# Patient Record
Sex: MALE | Race: WHITE | NOT HISPANIC OR LATINO | URBAN - METROPOLITAN AREA
[De-identification: names, ages, dates, MRNs, and addresses within clinical notes are randomized per-mention and may not be internally consistent; named-entity substitution may affect disease eponyms.]

---

## 2023-09-14 ENCOUNTER — HOSPITAL ENCOUNTER (OUTPATIENT)
Dept: DATA CONVERSION | Facility: HOSPITAL | Age: 53
End: 2023-09-14

## 2023-09-14 DIAGNOSIS — M54.50 LOW BACK PAIN, UNSPECIFIED: ICD-10-CM

## 2023-09-16 VITALS
SYSTOLIC BLOOD PRESSURE: 130 MMHG | DIASTOLIC BLOOD PRESSURE: 84 MMHG | WEIGHT: 264 LBS | BODY MASS INDEX: 39.1 KG/M2 | HEART RATE: 83 BPM | HEIGHT: 69 IN

## 2023-09-20 ENCOUNTER — HOSPITAL ENCOUNTER (OUTPATIENT)
Dept: DATA CONVERSION | Facility: HOSPITAL | Age: 53
Discharge: HOME | End: 2023-09-20
Payer: COMMERCIAL

## 2023-09-20 DIAGNOSIS — M54.50 LOW BACK PAIN, UNSPECIFIED: ICD-10-CM

## 2023-10-04 ENCOUNTER — APPOINTMENT (OUTPATIENT)
Dept: PHYSICAL THERAPY | Facility: CLINIC | Age: 53
End: 2023-10-04
Payer: COMMERCIAL

## 2023-10-06 PROBLEM — M89.8X5: Status: ACTIVE | Noted: 2023-10-06

## 2023-10-06 PROBLEM — M25.80 CALCIFICATION, JOINT: Status: ACTIVE | Noted: 2023-10-06

## 2023-10-06 PROBLEM — M54.16 LUMBAR RADICULOPATHY: Status: ACTIVE | Noted: 2023-10-06

## 2023-10-06 PROBLEM — R20.2 NUMBNESS AND TINGLING IN RIGHT HAND: Status: ACTIVE | Noted: 2023-10-06

## 2023-10-06 PROBLEM — N52.9 ERECTILE DYSFUNCTION: Status: ACTIVE | Noted: 2023-10-06

## 2023-10-06 PROBLEM — R20.0 NUMBNESS AND TINGLING IN RIGHT HAND: Status: ACTIVE | Noted: 2023-10-06

## 2023-10-06 PROBLEM — L03.019 PARONYCHIA OF FINGER: Status: ACTIVE | Noted: 2023-10-06

## 2023-10-06 PROBLEM — E29.1 TESTICULAR HYPOFUNCTION: Status: ACTIVE | Noted: 2023-10-06

## 2023-10-06 PROBLEM — M51.36 DEGENERATION OF INTERVERTEBRAL DISC OF LUMBAR REGION: Status: ACTIVE | Noted: 2023-10-06

## 2023-10-06 PROBLEM — M10.071 ACUTE IDIOPATHIC GOUT OF RIGHT FOOT: Status: ACTIVE | Noted: 2023-10-06

## 2023-10-06 PROBLEM — K76.89 LIVER CYST: Status: ACTIVE | Noted: 2023-10-06

## 2023-10-06 PROBLEM — M16.11 PRIMARY OSTEOARTHRITIS OF RIGHT HIP: Status: ACTIVE | Noted: 2023-10-06

## 2023-10-06 PROBLEM — R79.89 ABNORMAL LFTS: Status: ACTIVE | Noted: 2023-10-06

## 2023-10-06 PROBLEM — M47.816 LUMBAR SPONDYLOSIS: Status: ACTIVE | Noted: 2023-10-06

## 2023-10-06 PROBLEM — L03.114 CELLULITIS OF HAND, LEFT: Status: ACTIVE | Noted: 2023-10-06

## 2023-10-06 PROBLEM — R16.1 SPLENOMEGALY: Status: ACTIVE | Noted: 2023-10-06

## 2023-10-06 PROBLEM — I10 BENIGN HYPERTENSION: Status: ACTIVE | Noted: 2023-10-06

## 2023-10-06 PROBLEM — S70.00XA CONTUSION OF HIP: Status: ACTIVE | Noted: 2023-10-06

## 2023-10-06 PROBLEM — E66.01 MORBID OBESITY (MULTI): Status: ACTIVE | Noted: 2023-10-06

## 2023-10-06 PROBLEM — K76.0 FATTY LIVER: Status: ACTIVE | Noted: 2023-10-06

## 2023-10-06 PROBLEM — L02.216 ABSCESS OF UMBILICUS: Status: ACTIVE | Noted: 2023-10-06

## 2023-10-06 PROBLEM — E11.9 DIABETES MELLITUS TYPE 2, CONTROLLED, WITHOUT COMPLICATIONS (MULTI): Status: ACTIVE | Noted: 2023-10-06

## 2023-10-06 PROBLEM — M51.369 DEGENERATION OF INTERVERTEBRAL DISC OF LUMBAR REGION: Status: ACTIVE | Noted: 2023-10-06

## 2023-10-06 RX ORDER — CYCLOBENZAPRINE HCL 10 MG
1 TABLET ORAL DAILY
COMMUNITY
End: 2023-11-13 | Stop reason: ALTCHOICE

## 2023-10-06 RX ORDER — PREDNISONE 10 MG/1
TABLET ORAL
COMMUNITY
Start: 2023-05-08 | End: 2023-11-02 | Stop reason: ALTCHOICE

## 2023-10-06 RX ORDER — PHENYLPROPANOLAMINE/CLEMASTINE 75-1.34MG
TABLET, EXTENDED RELEASE ORAL EVERY 8 HOURS
COMMUNITY

## 2023-10-06 RX ORDER — DICLOFENAC SODIUM 50 MG/1
1 TABLET, DELAYED RELEASE ORAL EVERY 8 HOURS
COMMUNITY
End: 2023-12-28 | Stop reason: ALTCHOICE

## 2023-10-06 RX ORDER — SILDENAFIL 100 MG/1
1 TABLET, FILM COATED ORAL
COMMUNITY
Start: 2021-06-01 | End: 2024-02-26 | Stop reason: SDUPTHER

## 2023-10-13 ENCOUNTER — APPOINTMENT (OUTPATIENT)
Dept: PHYSICAL THERAPY | Facility: CLINIC | Age: 53
End: 2023-10-13
Payer: COMMERCIAL

## 2023-10-17 ENCOUNTER — APPOINTMENT (OUTPATIENT)
Dept: NEUROSURGERY | Facility: CLINIC | Age: 53
End: 2023-10-17
Payer: COMMERCIAL

## 2023-10-20 ENCOUNTER — APPOINTMENT (OUTPATIENT)
Dept: PHYSICAL THERAPY | Facility: CLINIC | Age: 53
End: 2023-10-20
Payer: COMMERCIAL

## 2023-10-23 ENCOUNTER — APPOINTMENT (OUTPATIENT)
Dept: PHYSICAL THERAPY | Facility: CLINIC | Age: 53
End: 2023-10-23
Payer: COMMERCIAL

## 2023-10-31 ENCOUNTER — OFFICE VISIT (OUTPATIENT)
Dept: NEUROSURGERY | Facility: CLINIC | Age: 53
End: 2023-10-31
Payer: COMMERCIAL

## 2023-10-31 VITALS — RESPIRATION RATE: 16 BRPM | WEIGHT: 260 LBS | BODY MASS INDEX: 38.68 KG/M2 | HEART RATE: 96 BPM

## 2023-10-31 DIAGNOSIS — M48.061 SPINAL STENOSIS, LUMBAR REGION, WITHOUT NEUROGENIC CLAUDICATION: Primary | ICD-10-CM

## 2023-10-31 PROCEDURE — 3079F DIAST BP 80-89 MM HG: CPT | Performed by: NEUROLOGICAL SURGERY

## 2023-10-31 PROCEDURE — 3044F HG A1C LEVEL LT 7.0%: CPT | Performed by: NEUROLOGICAL SURGERY

## 2023-10-31 PROCEDURE — 99202 OFFICE O/P NEW SF 15 MIN: CPT | Performed by: NEUROLOGICAL SURGERY

## 2023-10-31 PROCEDURE — 3075F SYST BP GE 130 - 139MM HG: CPT | Performed by: NEUROLOGICAL SURGERY

## 2023-10-31 PROCEDURE — 1036F TOBACCO NON-USER: CPT | Performed by: NEUROLOGICAL SURGERY

## 2023-10-31 SDOH — HEALTH STABILITY: PHYSICAL HEALTH: ON AVERAGE, HOW MANY DAYS PER WEEK DO YOU ENGAGE IN MODERATE TO STRENUOUS EXERCISE (LIKE A BRISK WALK)?: 0 DAYS

## 2023-10-31 SDOH — HEALTH STABILITY: PHYSICAL HEALTH: ON AVERAGE, HOW MANY MINUTES DO YOU ENGAGE IN EXERCISE AT THIS LEVEL?: 0 MIN

## 2023-10-31 ASSESSMENT — LIFESTYLE VARIABLES
HOW OFTEN DURING THE LAST YEAR HAVE YOU NEEDED AN ALCOHOLIC DRINK FIRST THING IN THE MORNING TO GET YOURSELF GOING AFTER A NIGHT OF HEAVY DRINKING: NEVER
HAS A RELATIVE, FRIEND, DOCTOR, OR ANOTHER HEALTH PROFESSIONAL EXPRESSED CONCERN ABOUT YOUR DRINKING OR SUGGESTED YOU CUT DOWN: NO
AUDIT-C TOTAL SCORE: 5
HOW OFTEN DO YOU HAVE A DRINK CONTAINING ALCOHOL: 2-3 TIMES A WEEK
HOW OFTEN DURING THE LAST YEAR HAVE YOU BEEN UNABLE TO REMEMBER WHAT HAPPENED THE NIGHT BEFORE BECAUSE YOU HAD BEEN DRINKING: NEVER
HOW OFTEN DURING THE LAST YEAR HAVE YOU FOUND THAT YOU WERE NOT ABLE TO STOP DRINKING ONCE YOU HAD STARTED: NEVER
HOW OFTEN DO YOU HAVE SIX OR MORE DRINKS ON ONE OCCASION: LESS THAN MONTHLY
AUDIT TOTAL SCORE: 5
HOW OFTEN DURING THE LAST YEAR HAVE YOU HAD A FEELING OF GUILT OR REMORSE AFTER DRINKING: NEVER
HOW MANY STANDARD DRINKS CONTAINING ALCOHOL DO YOU HAVE ON A TYPICAL DAY: 3 OR 4
HOW OFTEN DURING THE LAST YEAR HAVE YOU FAILED TO DO WHAT WAS NORMALLY EXPECTED FROM YOU BECAUSE OF DRINKING: NEVER
HAVE YOU OR SOMEONE ELSE BEEN INJURED AS A RESULT OF YOUR DRINKING: NO
SKIP TO QUESTIONS 9-10: 0

## 2023-10-31 ASSESSMENT — ENCOUNTER SYMPTOMS
LOSS OF SENSATION IN FEET: 0
OCCASIONAL FEELINGS OF UNSTEADINESS: 0
DEPRESSION: 0

## 2023-10-31 ASSESSMENT — PAIN SCALES - GENERAL: PAINLEVEL: 0-NO PAIN

## 2023-10-31 NOTE — PROGRESS NOTES
54 yo NPV referral for low back pain    53-year-old man presents for evaluation of lumbar stenosis.  Roughly 6 months ago, the patient experienced new onset of back pain.  Previously, he had developed some pain in his left knee.  The pain did not radiate from his back to his left knee.  He denies having any shooting pains going down his legs.  He denies weakness or numbness in the legs or bowel or bladder changes.    On exam, the patient is alert and interactive.  He has good range of motion at the waist.  Strength is full.    An MRI of the lumbar spine I personally reviewed demonstrates a congenitally narrow canal.  There multilevel degenerative disease.  At L2-3 there is a mild disc bulge that does not result in any significant canal stenosis.  At L3-4 there is a broad-based disc bulge that results in left greater than right-sided canal stenosis.  The canal stenosis is severe at this level.  L4-5 there is a disc bulge that results in moderate canal stenosis and bilateral recess stenosis.  At L5-S1 there is a central disc bulge and some right-sided foraminal stenosis.    While the patient has radiographic stenosis, he does not have any lumbar radiculopathy.  As such, I do not believe he would benefit from any surgical intervention at this time.  I educated them on the symptoms of nerve root compression and asked him to follow-up if any of these develop.

## 2023-11-01 ASSESSMENT — ENCOUNTER SYMPTOMS
CONSTITUTIONAL NEGATIVE: 1
RESPIRATORY NEGATIVE: 1
CARDIOVASCULAR NEGATIVE: 1

## 2023-11-01 NOTE — PATIENT INSTRUCTIONS
Continue to alternate ice and moist heat as needed    Start into Physical Therapy 1-2 times a week for 8-10 weeks with manual therapy as well as dry needling and IASTM    Stressed the importance of wearing shoes with good stability control to help with the biomechanics affecting the knees as well as the lower extremities    Stressed the importance of wearing full foot insoles to help with the biomechanics affecting the knees as well as the lower extremities, Recommendation over-the-counter calcium with vitamin-D 2 -3000+ milligrams a day, as well as OTC symphytum as directed daily to promote bony healing, in addition to a daily multivitamin.    Recommendation over-the-counter Move Free for joint health.    May take the following: Ibuprofen may alternate with OTC Tylenol Extra Strength or OTC Tylenol Arthritis, taking one every 6-8 hours with food as needed.    Patient advised regarding the risks and/or potential adverse reactions and/or side effects of any prescribed medications along with any over-the-counter medications or any supplements used. Patient advised to seek immediate medical care if any adverse reactions occur. The patient and/or patient(s) parent(s) verbalized their understanding,   Discussed in detail with the patient to the level of their understanding the possibility in the future of regenerative injections versus corticosteroid injections versus viscosupplementation injections versus a combination  MRI of the LEFT knees to rule out ligament or tendon injury versus meniscal injury versus stress fracture versus other    Follow up after MRI of LEFT knee with Dr. Heart to discuss injections

## 2023-11-01 NOTE — PROGRESS NOTES
Established patient  History Of Present Illness  Narciso Pardo is a 53 y.o. male presenting with re-injury of his LEFT knee. Unknown trauma or injury. He works at [employer] and is on his feet for twelve hours a day. Pain has worsened over the past two weeks to the point that it is making it difficult to walk. Pain also is disrupting sleep at night. He states that his pain primarily in the medial aspect of his knee. Pain with all range of motion. He does continue to wear the brace he was previously fitted for in this office and notes that while it does support his knee, it does nothing for his pain. Rates current pain as a 8/10 describing it as a constant throbbing pain with sitting and a shocking pain with movement. Pt continues to Move Free and NSAIDs for joint pain over the past month with no improvement in pain. Pt did physical therapy last time he was seen and notes that it did not make any improvements with his pain in his knee which was last done in early September.    Past Medical History  He has a past medical history of Fatty (change of) liver, not elsewhere classified, Other conditions influencing health status, Other obesity (05/11/2017), Other obesity (03/30/2018), Personal history of other diseases of the musculoskeletal system and connective tissue, Personal history of other diseases of the musculoskeletal system and connective tissue, Personal history of other mental and behavioral disorders, and Type 2 diabetes mellitus without complications (CMS/[facility]) (02/03/2017).    Surgical History  He has a past surgical history that includes Shoulder surgery (Left); Wrist surgery (Left); and Carpal tunnel release (Right).     Social History  He reports that he has never smoked. He has never used smokeless tobacco. He reports current alcohol use. He reports that he does not currently use drugs.    Family History  Family History   Problem Relation Name Age of Onset    Diabetes Mother      Diabetes Father  "         Allergies  Patient has no known allergies.    Review of Systems  Review of Systems   Constitutional: Negative.    Respiratory: Negative.     Cardiovascular: Negative.    All other systems reviewed and are negative.       Last Recorded Vitals  /82 (BP Location: Right arm, Patient Position: Sitting, BP Cuff Size: Large adult)   Pulse 88   Ht 1.727 m (5' 8\")   Wt 118 kg (260 lb)   BMI 39.53 kg/m²      Examination:  Left    Edema: Negative  Effusion: Negative.   Percussion Test:  Positive   Tuning Fork Test:  Positive  Ecchymosis/Bruising: Negative.            Palpation:   Positive  Left     Orientation: symmetrical    Range of Motion:    Positive Knee Flexion ( 0-135 degrees) Decreased because of pain  Positive Knee Extension ( 0-15 degrees) Decreased because of pain           Muscle Strength:    Positive +4/+5 Quadricep Extension Causes pain  Positive +4/+5 Hamstring Flexion Causes pain  +5+5 Gastrocnemius  +5+5 Soleus.            Proprioception:   Pain with Squat: Positive    Pain with Sumo Squat:  Positive   Hop Test: Positive    Single leg balance test Positive            Vascular:   +2/+4 Femoral  +2/+4 Dorsalis Pedis  +2/+4 Posterior Tibial  Capillary Refill less than 2 seconds.                Knee - ACL:  Anterior Drawer Test: Positive   Lachman Test: Positive    Lelli Test: Positive                    KNEE - MCL / LCL:  Valgus Stress Test: 90 degrees: Positive  20-30 degrees: Positive    Varus Stress Test:  90 degrees: Negative, 20-30 degrees: Negative.   Apley Distraction Test: Positive    Thessaly Test: Positive             KNEE - MENISCUS:  Apley Compression Test:  Positive        Stienmann Test:  Positive   Terrance Test:  Positive          Bounce Home Test:  Positive   Thessaly Test:  Positive             KNEE - PATELLA:  Apprehension Test:  Positive   Glide Test:  Positive   Grind Test: Positive   Patella Tracking Test: Positive              KNEE - QUADRICEPS:         VMO Test: Positive "   VLO Test:  Negative    Hip/Pelvis - Sacrum:  Leg Length Supine: Negative  Leg Length Supine to Seated (Derbolowsky Sign): Negative  Standing Flexion Test: Negative   Seated Flexion Test: Negative  Spring Test: Negative  Sacral Somatic Dysfunction: Negative  Hip Flexor Tightness: Positive BILATERAL  Hamstring Tightness: Positive LEFT > Right      Imaging and Diagnostics Review:  Radiology      Findings:    Assessment   1. Acute pain of left knee    2. Sprain of medial collateral ligament of left knee, subsequent encounter    3. Left knee injury, subsequent encounter    4. Injury of meniscus of knee, left, subsequent encounter    5. Hamstring strain, left, subsequent encounter    6. Quadriceps strain, left, subsequent encounter        Plan   Treatment or Intervention:  Continue to alternate ice and moist heat as needed    Start into Physical Therapy 1-2 times a week for 8-10 weeks with manual therapy as well as dry needling and IASTM    Stressed the importance of wearing shoes with good stability control to help with the biomechanics affecting the knees as well as the lower extremities    Stressed the importance of wearing full foot insoles to help with the biomechanics affecting the knees as well as the lower extremities, Recommendation over-the-counter calcium with vitamin-D 2 -3000+ milligrams a day, as well as OTC symphytum as directed daily to promote bony healing, in addition to a daily multivitamin.    Recommendation over-the-counter Move Free for joint health.    May take the following: Ibuprofen may alternate with OTC Tylenol Extra Strength or OTC Tylenol Arthritis, taking one every 6-8 hours with food as needed.    Patient advised regarding the risks and/or potential adverse reactions and/or side effects of any prescribed medications along with any over-the-counter medications or any supplements used. Patient advised to seek immediate medical care if any adverse reactions occur. The patient and/or  patient(s) parent(s) verbalized their understanding,   Discussed in detail with the patient to the level of their understanding the possibility in the future of regenerative injections versus corticosteroid injections versus viscosupplementation injections versus a combination  Possibility in future of MRI of the LEFT knees to rule out ligament or tendon injury versus meniscal injury versus stress fracture versus other       Diagnostic studies:      Activity Instructions, Restrictions, and Accommodations:  Pain as guide    Consultations/Referrals:  Physical therapy    Follow-up:  Follow up after MRI of LEFT knee with Dr. Heart to discuss injections    BING LAWRENCE on 11/2/23 at 9:11 AM.     Varghese Sevilla CNP

## 2023-11-02 ENCOUNTER — OFFICE VISIT (OUTPATIENT)
Dept: SPORTS MEDICINE | Facility: CLINIC | Age: 53
End: 2023-11-02
Payer: COMMERCIAL

## 2023-11-02 VITALS
DIASTOLIC BLOOD PRESSURE: 82 MMHG | WEIGHT: 260 LBS | HEIGHT: 68 IN | SYSTOLIC BLOOD PRESSURE: 128 MMHG | BODY MASS INDEX: 39.4 KG/M2 | HEART RATE: 88 BPM

## 2023-11-02 DIAGNOSIS — S76.112D QUADRICEPS STRAIN, LEFT, SUBSEQUENT ENCOUNTER: ICD-10-CM

## 2023-11-02 DIAGNOSIS — S83.8X2D INJURY OF MENISCUS OF KNEE, LEFT, SUBSEQUENT ENCOUNTER: Primary | ICD-10-CM

## 2023-11-02 DIAGNOSIS — M25.562 ACUTE PAIN OF LEFT KNEE: ICD-10-CM

## 2023-11-02 DIAGNOSIS — S89.92XD LEFT KNEE INJURY, SUBSEQUENT ENCOUNTER: ICD-10-CM

## 2023-11-02 DIAGNOSIS — S83.412D SPRAIN OF MEDIAL COLLATERAL LIGAMENT OF LEFT KNEE, SUBSEQUENT ENCOUNTER: ICD-10-CM

## 2023-11-02 DIAGNOSIS — S76.312D HAMSTRING STRAIN, LEFT, SUBSEQUENT ENCOUNTER: ICD-10-CM

## 2023-11-02 PROBLEM — S83.412A SPRAIN OF MEDIAL COLLATERAL LIGAMENT OF LEFT KNEE: Status: ACTIVE | Noted: 2023-11-02

## 2023-11-02 PROCEDURE — 3044F HG A1C LEVEL LT 7.0%: CPT | Performed by: NURSE PRACTITIONER

## 2023-11-02 PROCEDURE — 3079F DIAST BP 80-89 MM HG: CPT | Performed by: NURSE PRACTITIONER

## 2023-11-02 PROCEDURE — 99214 OFFICE O/P EST MOD 30 MIN: CPT | Performed by: NURSE PRACTITIONER

## 2023-11-02 PROCEDURE — 1036F TOBACCO NON-USER: CPT | Performed by: NURSE PRACTITIONER

## 2023-11-02 PROCEDURE — 3074F SYST BP LT 130 MM HG: CPT | Performed by: NURSE PRACTITIONER

## 2023-11-02 PROCEDURE — 2500000004 HC RX 250 GENERAL PHARMACY W/ HCPCS (ALT 636 FOR OP/ED): Performed by: NURSE PRACTITIONER

## 2023-11-02 RX ORDER — KETOROLAC TROMETHAMINE 30 MG/ML
60 INJECTION, SOLUTION INTRAMUSCULAR; INTRAVENOUS ONCE
Status: COMPLETED | OUTPATIENT
Start: 2023-11-02 | End: 2023-11-02

## 2023-11-02 RX ORDER — PREDNISONE 10 MG/1
TABLET ORAL
Qty: 30 TABLET | Refills: 0 | Status: SHIPPED | OUTPATIENT
Start: 2023-11-02 | End: 2023-12-28 | Stop reason: ALTCHOICE

## 2023-11-02 RX ADMIN — KETOROLAC TROMETHAMINE 60 MG: 60 INJECTION, SOLUTION INTRAMUSCULAR at 09:30

## 2023-11-02 RX ADMIN — METHYLPREDNISOLONE SODIUM SUCCINATE 125 MG: 125 INJECTION, POWDER, FOR SOLUTION INTRAMUSCULAR; INTRAVENOUS at 09:31

## 2023-11-02 ASSESSMENT — PAIN SCALES - GENERAL
PAINLEVEL_OUTOF10: 8
PAINLEVEL_OUTOF10: 8

## 2023-11-02 ASSESSMENT — PAIN - FUNCTIONAL ASSESSMENT: PAIN_FUNCTIONAL_ASSESSMENT: 0-10

## 2023-11-02 ASSESSMENT — PAIN DESCRIPTION - DESCRIPTORS: DESCRIPTORS: DISCOMFORT;THROBBING;SHOOTING

## 2023-11-10 ENCOUNTER — HOSPITAL ENCOUNTER (OUTPATIENT)
Dept: RADIOLOGY | Facility: HOSPITAL | Age: 53
Discharge: HOME | End: 2023-11-10
Payer: COMMERCIAL

## 2023-11-10 DIAGNOSIS — S83.412D SPRAIN OF MEDIAL COLLATERAL LIGAMENT OF LEFT KNEE, SUBSEQUENT ENCOUNTER: ICD-10-CM

## 2023-11-10 DIAGNOSIS — S76.312D HAMSTRING STRAIN, LEFT, SUBSEQUENT ENCOUNTER: ICD-10-CM

## 2023-11-10 DIAGNOSIS — M25.562 ACUTE PAIN OF LEFT KNEE: ICD-10-CM

## 2023-11-10 DIAGNOSIS — S83.8X2D INJURY OF MENISCUS OF KNEE, LEFT, SUBSEQUENT ENCOUNTER: ICD-10-CM

## 2023-11-10 DIAGNOSIS — S76.112D QUADRICEPS STRAIN, LEFT, SUBSEQUENT ENCOUNTER: ICD-10-CM

## 2023-11-10 DIAGNOSIS — S89.92XD LEFT KNEE INJURY, SUBSEQUENT ENCOUNTER: ICD-10-CM

## 2023-11-10 PROCEDURE — 73721 MRI JNT OF LWR EXTRE W/O DYE: CPT | Mod: LT

## 2023-11-10 PROCEDURE — 73721 MRI JNT OF LWR EXTRE W/O DYE: CPT | Mod: LEFT SIDE | Performed by: RADIOLOGY

## 2023-11-10 NOTE — PROGRESS NOTES
"Verbal consent of the patient and/or verbal parental consent for patients under the age of 18 have been obtained to conduct a physical examination at this office visit.    Established patient  History Of Present Illness  11/13/23 Narciso Pardo is a 53 y.o. male who presents to review his LEFT knee MRI. He completed Physical Therapy in September 2023. He continues to perform his home exercise program previously provided to him by Physical Therapy. He describes his pain as sharp and burning along the medial aspect of his left knee. He notes pain exacerbates when transitioning from sitting to standing, with ambulation and throughout the evening. He states stretching and stairs in both directions also increase pain. He cites pain disturbs his sleep. He walks with a myopathic gait. Narciso works 12 hour swing shifts at [employer] on a [employer] and states he has been unable to work a full shift since returning to work in September. He tells that he works on concrete floor and consistently goes up and down on ladders. He rates his pain at 7/10 today, but states pain reaches 10/10 daily. He states that his pain is constant. He continues to take previously prescribed Prednisone and takes OTC Ibuprofen, applies OTC Voltaren gel and ice with no relief. He has also worn previously supplied Playmaker knee brace but says his pain increased with use so he stopped wearing it. 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  He would like a cortisone shot if possible.    Last Recorded Vitals  BP (!) 140/92   Pulse 72   Ht 1.727 m (5' 8\")   Wt 118 kg (260 lb)   BMI 39.53 kg/m²      All previous Progress Notes and imaging results related to this patients chief complaint have been reviewed in preparation for this examination.    Past Medical History  He has a past medical history of Fatty (change of) liver, not elsewhere classified, Other " conditions influencing health status, Other obesity (05/11/2017), Other obesity (03/30/2018), Personal history of other diseases of the musculoskeletal system and connective tissue, Personal history of other diseases of the musculoskeletal system and connective tissue, Personal history of other mental and behavioral disorders, and Type 2 diabetes mellitus without complications (CMS/Formerly [facility]) (02/03/2017).    Surgical History  He has a past surgical history that includes Shoulder surgery (Left); Wrist surgery (Left); and Carpal tunnel release (Right).     Social History  He reports that he has never smoked. He has never used smokeless tobacco. He reports current alcohol use. He reports that he does not currently use drugs.    Family History  Family History   Problem Relation Name Age of Onset    Diabetes Mother      Diabetes Father          Allergies  Patient has no known allergies.    Historical Clinical Intake  May 10, 2023--- EAE --- Verbal consent of the patient and/or verbal parental consent for patients under the age of 18 have been obtained to conduct a physical examination at this office visit. Newly established patient. Pt is a 53yr old male, who works as a . He has been graciously referred to this office by Dr. Villalobos for LEFT knee pain that has been bothering him for a month. No known trauma or injury. 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 By Monday, he noticed pain that started in the back of his knee and then moved to the inside. Pain with flexion, extension and squatting. Pt notes that his pain has caused him to start walking with a limp. He purchased a compression knee sleeve as well as alternating between Aleve and Ibuprofen with no relief in pain prior to seeing his primary care provider. Upon suggestion, patient has recently purchased a hinged knee brace and has been taking Prednisone as prescribed by his primary care  provider. He believes that both have currently helped provide some relief in his pain. Rates current pain as a 5/10 describing it as a constant ache, though prior to the new brace and Prednisone, his pain was a 7/10. Denies any numbness or tingling. No previous history of injury.  ---------------------------------------  June 29, 2023 Above note reviewed. Verbal consent of the patient and/or verbal parental consent for patients under the age of 18 have been obtained to conduct a physical examination at this office visit. Narciso returns today for his follow up LEFT knee. Serg presents today with increased pain to LEFT medial knee and additionally LEFT lumbar pain. His gait is ataxic, although he isn't using any assistance devices. He rates his LEFT knee pain 6/10 described as aching and his Left LUMBAR pain as 8/10 described as burning and stabbing. He has not started physical therapy yet, he was out of town and he plans on starting soon. He is wearing a copper knee brace for additional support and he does take the Move free supplement. He adds that he saw Dr Villalobos for his Lumbar pain and he did some xrays and put him on Prednisone, which did not help. Serg denies and tingling or numbness. He takes ibuprofen for pain and he applies ice for some relief. We discussed treatment options. Patient continues to show indications of a knee sprain for which I recommended that he wear a hinged metal brace. Patient was using an over-the-counter hinged middle brace but states that it did not of the any durability and fell apart. As such we will fit patient for playmaker brace for increased ability under ability for his knee and he will start physical therapy as soon as possible. Patient also complaining of low back pain which may be exacerbated by his knee sprain after exam there indications of a low back strain we will amend his physical therapy order to include as lower back and can re-evaluate at his follow-up appointment.  Patient verbalizes understanding and agreement with plan of care.  ---------------------------------------  July 5, 2023. HW  Verbal consent of the patient and/or verbal parental consent for patients under the age of 18 have been obtained to conduct a physical examination at this office visit. This 53 year old male presents with left sided low back pain. He presents today with a limp. He states he was here last Thursday for an appointment for his left knee. He mentioned at the appointment that his low back was bothering him. He states a couple weeks ago he went to [place] to [facility] and when he came home, his low back pain started. He was given a Toradol and Solumedrol injection and he was feeling better Friday morning when he woke up. He states Friday morning he fell down 6 steps on his buttocks, mostly on his left side. He states he also hit his left knee when he fell, but he is no longer having pain in his knee. He states he has pain all the time, with no relief. He has been taking Cyclobenzaprine and Diclofenac with no relief. He hasn't been able to sleep well due to his pain. He states he has more pain with back extension than back flexion. He denies any radiculitis. He was unable to go to work this weekend. 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 He iced, heated, took medications and tried stretching, but he is still in pain. He rates his pain at 8/10 and describes it as sharp. He is supposed to work the next 3 nights, but doesn't think is able to.  ---------------------------------------  August 2, 2023 Above notes reviewed. Verbal consent of the patient and/or verbal parental consent for patients under the age of 18 have been obtained to conduct a physical examination at this office visit. Narciso returns today for his follow up of his LOW BACK. He states that he is feeling slightly improved since his previous visit. Rates current pain as a 3.5/10 describing it as a stiffness. Pt has  "remained out of work since last seen in office. He has completed seven sessions of physical therapy since last visit and feels that it has been helping with his pain. We discussed treatment options. Patient continues to have point tenderness over the lower lumbar spine as well as significant restriction especially with extension lateral flexion and rotation. Patient continues to complain of pain although he states that is improved. Patient has only completed 3 weeks of physical therapy and as such we will continue with current treatment options for another 3-4 weeks and re-evaluate at that time. If patient continues to have pain tenderness and restriction we will consider an MRI at that time. Patient verbalizes understanding and agreement with plan of care.  ---------------------------------------  August 30, 2023 Above notes reviewed. Verbal consent of the patient and/or verbal parental consent for patients under the age of 18 have been obtained to conduct a physical examination at this office visit. Narciso returns today for his follow up of his low back. Pt states that he is feeling better since his previous visit. He feels that his back is back to normal but his knee pain comes and goes. Rates current back pain as a 2/10 describing it as stiff and his knee pain as a 3/10 describing it as a \"nagging\" pain. He continues with physical therapy with about six sessions left, noting that he feels it is helping his back more than his knee. Pt feels that he can return back to work and will bring in paperwork to be completed to document such. We discussed treatment options. Patient has been doing well with his physical therapy and states that he is feeling largely back to normal. Upon exam patient does continue to have point tenderness over the lumbar spine specifically the L3 vertebrae and associated paraspinal muscles. As such we will order an MRI to better evaluate for any underlying pathology. Patient can return to " work without restrictions and we can reevaluate after his MRI or as symptoms change. Patient verbalizes understanding and agreement with plan of care.  ---------------------------------------  September 26, 2023 Above notes reviewed. Verbal consent of the patient and/or verbal parental consent for patients under the age of 18 have been obtained to conduct a physical examination at this office visit. Previously established patient. Narciso returns today for his MRI follow up of his LUMBAR spine. States that his back is feeling somewhat better rating pain as a 2/10 describing it as stiffness. His knee however is worse, rating it as a 6/10. Pt has started and is continuing with physical therapy for his knee. He has been wearing the brace and notes that it is supportive but not making it feel better. We reviewed patient MRI results in detail. Patient verbalizes understanding of results. We discussed treatment options and will refer patient to Dr. Sosa for surgical consult and evaluation as recommended by radiology. Patient can follow-up after surgical consult for reevaluation of the knee and we can discuss treatment options at that time such as physical therapy or more advanced imaging. Patient verbalizes understanding and agreement with plan of care.  ---------------------------------------  11/2/2023---Narciso Pardo is a 53 y.o. male presenting with re-injury of his LEFT knee. Unknown trauma or injury. He works at [employer] and is on his feet for twelve hours a day. Pain has worsened over the past two weeks to the point that it is making it difficult to walk. Pain also is disrupting sleep at night. He states that his pain primarily in the medial aspect of his knee. Pain with all range of motion. He does continue to wear the brace he was previously fitted for in this office and notes that while it does support his knee, it does nothing for his pain. Rates current pain as a 8/10 describing it as a constant  throbbing pain with sitting and a shocking pain with movement. Pt continues to Move Free and NSAIDs for joint pain over the past month with no improvement in pain. Pt did physical therapy last time he was seen and notes that it did not make any improvements with his pain in his knee which was last done in early September.    Review of Systems:  CONSTITUTIONAL:   Negative for weight change, loss of appetite, fatigue, weakness, fever, chills, night sweats, headaches .           HEENT:   Negative for cold, cough, sore throat, sinus pain, swollen lymph nodes.           OPHTHALMOLOGY:   Negative for diminished vision, blurred vision, loss of vision, double vision.           ALLERGY:   Negative for runny nose, scratchy throat, sinus congestion, rash, facial pressure, nasal congestion, post-nasal drip.           CARDIOLOGY:   Negative for chest pain, palpitations, murmurs, irregular heart beat, shortness of breath, leg edema, dyspnea on exertion, fatigue, dizziness.           RESPIRATORY:   Negative for chest pain, shortness of breath, swelling of the legs, asthma/copd, chest congestion, pain with breathing .           GASTROENTEROLOGY:   Negative for nausea, vomitting, heartburn, constipation, diarrhea, blood in stool, change in bowel habits, black stool.           HEMATOLOGY/LYMPH:   Negative for fatigue, loss of appetitie, easy bruising, easy bleeding, anemia, abnormal bleeding, slow healing.           ENDOCRINOLOGY:   Negative for polyuria, polydipsia, polyphagia, fatigue, weight loss, weight gain, cold intolerance, heat intolerance, diabetes.           MUSCULOSKELETAL:   Positive  for LEFT Knee pain        DERMATOLOGY:   Negative for rash, bruising.           NEUROLOGY:   Negative for tingling, numbness, gait abnormality, paresthesias, weakness, sciatica.        General Examination:  GENERAL APPEARANCE: Appears well, pleasant, and cooperative, NAD.   HEENT: Normal, unremarkable.   NECK: Supple.   HEART: RRR, normal  S1S2.   LUNGS: Clear to auscultation bilaterally.   ABDOMEN: Soft, NT/ND, BS present.   EXTREMITIES: No cyanosis, clubbing.   SKIN: No rash or cellulitis.   NEUROLOGIC EXAM: Awake, A&O x 3, CN's II-XII grossly intact.   PSYCH: Good eye contact, appropriate mood and affect     Examination:  Left  Knee  Edema Negative  Effusion: Negative.   Percussion Test:  Positive    Tuning Fork Test:  Positive    Ecchymosis/Bruising: Negative.            Palpation:   Positive Tender to palpation body and posterior joint line medial meniscus LEFT      Orientation:  Asymmetrical: because of the swelling.            Range of Motion:   Positive Knee Flexion ( 0-135 degrees) Decreased because of pain and swelling  Positive Knee Extension ( 0-15 degrees) Decreased because of pain and swelling  about 160 degrees    Muscle Strength:    Positive +4/+5 Quadricep Extension Causes pain  Positive +4/+5  Hamstring Flexion Causes pain          +5+5 Gastrocnemius  +5+5 Soleus.            Proprioception:        Pain with Squat: Positive    Pain with Sumo Squat:  Positive    Hop Test: Positive    Single leg balance test Positive            Vascular:   +2/+4 Femoral  +2/+4 Dorsalis Pedis  +2/+4 Posterior Tibial  Capillary Refill less than 2 seconds.    Diagnostic studies:  MRI of the left knee without contrast      INDICATION:  Signs/Symptoms:LEFT KNEE PAIN      COMPARISON:  None.      ACCESSION NUMBER(S):  [id number]      ORDERING CLINICIAN:  JODIE TORRE      TECHNIQUE:  Multiplanar multisequence MRI of the left knee was performed without  intravenous contrast.      FINDINGS:  Menisci:  Lateral meniscus is intact. Horizontal tear of the body and  posterior horn segments of the medial meniscus with fluid signal  reaching the inferior surface..      Cruciate ligaments: Intact.      Collateral ligaments: Intact.      Tendons:  Intact.      Muscles: Intact.      Bones:  No evidence of acute fracture. Bone marrow signal intensity  is within  normal limits. Sequela of chronic Osgood-Schlatter with  corticated prominence of the tibial tubercle.      Articular cartilage:  Lateral compartment: Intact.  Medial compartment: Mild thinning of the posterior weight-bearing  medial femoral condyle involving a small area of proximally about 14  mm AP. Medial tibial plateau cartilage is intact. Patellofemoral  compartment: Focal near full-thickness chondral loss of superior  aspect of the medial trochlear groove with surrounding areas of  delamination.      Miscellaneous: A small knee joint effusion is noted. A small Baker's  cyst is noted..      IMPRESSION:  1. Horizontal tear of the body and posterior horn segments of the  medial meniscus.  2. Mild medial and patellofemoral compartment degenerative changes  with chondral loss as described above.  3. Small Bakers cyst   4. Small joint effusion  5. Chronic Osgood Schlatter disease      ------------------------------------------------------------------------    PROCEDURE:  KNEE LT MIN 4 VIEW - TXR  [id number]  REASON FOR EXAM: ACUTE PAIN OF LEFT KNEE  RESULT: MRN: [id number]     Patient Name: FARZANA RUSH   STUDY: KNEE LT MIN 4 VIEW 5/10/2023 8:35 am  INDICATION: ACUTE PAIN OF LEFT KNEE 53-year-old man with left knee pain for 1 month, medially. No known injury  COMPARISON: Left tibial radiograph 02/03/2017     ACCESSION NUMBER(S): [id number]   ORDERING CLINICIAN: JODIE TORRE     TECHNIQUE: Four views of the left knee were performed.     FINDINGS:  No cortical irregularity or lucency is identified to suggest acute fracture or dislocation of the left knee. There is mild tricompartmental joint space narrowing with small osteophyte formation. No significant suprapatellar knee joint effusion. Prominent enthesophyte at the tibial tubercle. Small enthesophytes superiorly and inferiorly at the patella.     IMPRESSION:  Degenerative changes of the left knee, as detailed above. No sign of acute fracture or significant  suprapatellar knee joint effusion. If symptoms persist, consider MRI for further evaluation.     Dictation workstation:   [id number]   Original Interpreting Physician:   FRANCESCA HOWELL MD  Original Transcribed by/Date: MMODAL May 10 2023  8:13A      Knee - ACL:  Anterior Drawer Test: Positive    Lachman Test: Positive       Lelli Test:  Positive       KNEE - MCL / LCL:  Valgus Stress Test:  90 degrees: Negative, 20-30 degrees: Negative.   Varus Stress Test:  90 degrees: Positive  20-30 degrees: Positive   Apley Distraction Test: Positive Lateral.   Thessaly Test: Positive Anteior Lateral Meniscus, Posterior Lateral Meniscus, Origins and Insertion LCL, Distal Hamstring.        KNEE - IT BAND:  Cayla Test: Negative.   Noble Test: Negative.         KNEE - MENISCUS:  Apley Compression Test: Positive  Lateral joint line.   Stienmann Test:  Positive    Terrance Test: Positive  Lateral joint line.   Bounce Home Test:  Positive   Thessaly Test:  Positive Anterior Lateral Joint Line Pain and Posterior Lateral Joint Line Pain.            KNEE - PATELLA:  Apprehension Test:  Positive   Glide Test:  Positive   Grind Test: Positive   Patella Tracking Test: Positive               KNEE - QUADRICEPS:    VMO Test: Positive    VLO Test:  Negative.                 Feet/Foot: Positive  BILATERAL  Valgus foot         Assessment      1. Acute pain of left knee  Referral to Physical Therapy    Knee Brace, Hinged      2. Sprain of medial collateral ligament of left knee, subsequent encounter  Referral to Physical Therapy    Knee Brace, Hinged      3. Left knee injury, subsequent encounter  Referral to Physical Therapy    Knee Brace, Hinged      4. Injury of meniscus of knee, left, subsequent encounter  Referral to Physical Therapy    Knee Brace, Hinged    Horizontal tear of the body and posterior horn segments of the  medial meniscus.      5. Instability of left knee joint  Knee Brace, Hinged      6. Hamstring strain, left, subsequent  encounter  Referral to Physical Therapy    Knee Brace, Hinged      7. Quadriceps strain, left, subsequent encounter  Referral to Physical Therapy    Knee Brace, Hinged      8. Patellar maltracking, unspecified laterality  Referral to Physical Therapy    Knee Brace, Hinged      9. Hamstring tightness of both lower extremities  Referral to Physical Therapy    Knee Brace, Hinged      10. Hip flexor tightness, unspecified laterality  Referral to Physical Therapy    Knee Brace, Hinged      11. Leg length discrepancy        12. Valgus deformity of both feet          Procedure   Time Out (5 Minutes): Yes  Patient Name: Narciso Pardo  YOB: 1970  Procedure:  Corticosteroid injection  Needed Supplies Available: Correct Supplies  Laterality: Left knee  Site Verified and Marked: Correct Site(s) Marked  Timeout Performed by Provider: Dr. Douglas Heart D.O.  Staff Initials: ALZ    L Inj/Asp: L knee on 11/13/2023 12:38 PM  Indications: pain and diagnostic evaluation  Details: 22 G needle, ultrasound-guided  Medications: 3 mL lidocaine 20 mg/mL (2 %); 3 mL bupivacaine PF 0.25 % (2.5 mg/mL); 12 mg betamethasone acet,sod phos 6 mg/mL; 5 mg dexAMETHasone 10 mg/mL  Outcome: tolerated well, no immediate complications  Procedure, treatment alternatives, risks and benefits explained, specific risks discussed. Consent was given by the patient. Immediately prior to procedure a time out was called to verify the correct patient, procedure, equipment, support staff and site/side marked as required.           Patient is informed and consent has been signed by the patient if over 18 years old., Patient parent and/or legal guardian is informed and consent has been signed., Patient and/or parent and/or legal guardian accept all risks, benefits, and possible complications associated with procedure(s) and/or manipulation(s) and/or injection(s)., Patient and/or parent and/or legal guardian deny patient allergy or known  complications with any of the medications, instruments, products, and/or techniques used., All questions and concerns were answered and/or addressed with the patient and/or parent and/or legal guardian., The patient and/or parent and/or legal guardian agree to proceed with the procedure(s) and/or manipulation(s) and/or injection(s)., Prep: The area was cleansed with a mixture of equal parts rubbing alcohol 70% and Hibclens., Utilizing clean technique, the injection site(s) were marked with a skin marker., and Injection site(s) were anesthestized with topical analgesic spray prior to injection.    Performed corticosteroid injection into the patients Left knee, under ultrasound.  Corticosteroid mixture of 3cc 2% Lidocaine, 3cc 2% Bupivacaine, 2cc Celestone, 1cc Dexamethasone  (ADD MEDICATIONS TO 'IN OFFICE INJECTION DETAILS' SECTION OF THE ROOMING TAB - DELETE THIS STATEMENT WHEN COMPLETE)    Band-aid and/or compressive bandage was placed over the injection site(s). After the injection(s) performed osteopathic manipulation therapy to the affected area(s) to help increase blood flow to aid with the healing process as well as circulation of the medication. The patient tolerated the procedure well without any complications. The patient was instructed to gently massage the treatment site(s) as well as to apply moist heat to the affected area(s). Additionally, the patient was instructed to contact the office immediately with any complications or concerns.    The Scribe, Aracelis, and Nurse,  Pat, were present in the room during the entire procedure.    The following discharge instructions were reviewed in detail with the patient to the level of their understanding:    PAIN:  A mild to moderate amount of discomfort, tenderness, stiffness, and achiness-caused by the area injected can be expected for a few days after the injection.     SKIN: Due to the numbing spray used, you may develop a red, itchy, scaly rash in the area  that was sprayed. Should your skin become itchy, wash area with mild soap and warm water. If needed, you may apply topical, over-the-counter Hydrocortisone cream to alleviate any itchiness. AVOID scratching due to risk of infection.    BATHING/SWIMMING: You may shower after your procedure with regular soap and water, but no baths, no swimming, and no hot tubs for 3-4 days after the procedure due to risk of infection.    ACTIVITIES:  Immediately following the injection, you may resume daily activities (such as work) and light exercise. We suggest that you refrain from strenuous activity and heavy lifting, particularly the injured body part, for a week post-procedure, but sometimes this can change as well.    MOIST HEAT/ICE:  Moist heat or ice may be used on the injection area.     MEDICATION: You may continue your prescribed medications, over the counter medications or supplements, Aspirin, and/or any anti-inflammatories (Motrin, Advil, Aleve, Ibuprofen, Naproxen etc.). Tylenol Extra Strength, Tylenol Arthritis, or any prescribed narcotics or muscle relaxants are OK to take also.    APPOINTMENTS: You should have a follow-up appointment with Dr. Heart scheduled 1-3 weeks as recommended after your procedure. Please schedule your follow-up appointment on your way out after your procedure, or call the office to schedule these appointments as soon as possible.    PRECAUTIONS: Early, rare post procedure problems that can be concerning are as follows: an increase in pain not relieved by medication (over the counter or prescription), a temperature above 101 degrees, excessive bleeding or progressive, extreme swelling, or numbness.     CALL THE OFFICE OR GO TO THE EMERGENCY ROOM IF ANY OF THESE SYMPTOMS OCCUR.   If you have any questions or problems, please call our office at [phone]        Treatment or Intervention:  Continue to alternate ice and moist heat as needed    Start into Physical Therapy 1-2 times a week for  8-10 weeks with manual therapy as well as dry needling and IASTM especially building up VMO strength   Stressed the importance of wearing shoes with good stability control to help with the biomechanics affecting the knees as well as the lower extremities    Stressed the importance of wearing full foot insoles to help with the biomechanics affecting the knees as well as the lower extremities, Recommendation over-the-counter calcium with vitamin-D 2 -3000+ milligrams a day, as well as OTC symphytum as directed daily to promote bony healing, in addition to a daily multivitamin.    Recommendation over-the-counter Move Free for joint health.    May take the following: Ibuprofen may alternate with OTC Tylenol Extra Strength or OTC Tylenol Arthritis, taking one every 6-8 hours with food as needed.    Patient advised regarding the risks and/or potential adverse reactions and/or side effects of any prescribed medications along with any over-the-counter medications or any supplements used. Patient advised to seek immediate medical care if any adverse reactions occur. The patient and/or patient(s) parent(s) verbalized their understanding,   Discussed in detail with the patient to the level of their understanding the possibility in the future of regenerative injections versus corticosteroid injections versus viscosupplementation injections versus a combination   Reviewed LEFT knee MRI in detail with the patient and/or patients parent/legal guardian to their level of understanding; a copy of these results were provided to the patient and/or patients parent/legal guardian at the time of this office visit. Discussed treatment options with the patient and/or patients parent/legal guardian in detail to the level of their understanding. The patient and/or patients parent/legal guardian verbalized their understanding and agreement to the treatment plan at the time of this office visit.   Discussed with patient to possibility of  surgical intervention in the future to repair meniscal injury.   Performed cortisone injection into LEFT knee under ultrasound  Office to begin initiation for joint lubrication injections for pt LEFT knee.     Diagnostic studies:  No additional imaging or laboratory studies are needed at this time.    Activity Instructions, Restrictions, and Accommodations:  The family has been provided a note (after visit summary) outlining all current activity instructions, restrictions, and accommodations.    Consultations/Referrals:  None at this time.    Follow-up once approval for joint lubrication of the patients Left knee or sooner as needed. Aracelis HAJI, attest this documentation has been prepared under the direction and in the presence of Darline Heart D.O. By signing below, Douglas HAJI D.O, personally performed the services described in this documentation. All medical record entries made by the scribe were at my direction and in my presence. I have reviewed the chart and agree that the record reflects my personal performance and is accurate and complete. Please note that this report has been partially produced using speech recognition software. It may contain errors related to grammar, punctuation or spelling. Electronically signed, but not reviewed. Douglas Heart D.O. Director of Sports Medicine.     ARACELIS LEMA on 11/13/23 at 12:34 PM.     Douglas Heart DO FAOALEX

## 2023-11-13 ENCOUNTER — OFFICE VISIT (OUTPATIENT)
Dept: SPORTS MEDICINE | Facility: CLINIC | Age: 53
End: 2023-11-13
Payer: COMMERCIAL

## 2023-11-13 ENCOUNTER — TELEPHONE (OUTPATIENT)
Dept: SPORTS MEDICINE | Facility: CLINIC | Age: 53
End: 2023-11-13

## 2023-11-13 VITALS
WEIGHT: 260 LBS | HEART RATE: 72 BPM | BODY MASS INDEX: 39.4 KG/M2 | DIASTOLIC BLOOD PRESSURE: 92 MMHG | HEIGHT: 68 IN | SYSTOLIC BLOOD PRESSURE: 140 MMHG

## 2023-11-13 DIAGNOSIS — S83.8X2D INJURY OF MENISCUS OF KNEE, LEFT, SUBSEQUENT ENCOUNTER: ICD-10-CM

## 2023-11-13 DIAGNOSIS — M24.559 HIP FLEXOR TIGHTNESS, UNSPECIFIED LATERALITY: ICD-10-CM

## 2023-11-13 DIAGNOSIS — M25.562 ACUTE PAIN OF LEFT KNEE: ICD-10-CM

## 2023-11-13 DIAGNOSIS — Q66.6 VALGUS DEFORMITY OF BOTH FEET: ICD-10-CM

## 2023-11-13 DIAGNOSIS — M22.8X9 PATELLAR MALTRACKING, UNSPECIFIED LATERALITY: ICD-10-CM

## 2023-11-13 DIAGNOSIS — S76.312D HAMSTRING STRAIN, LEFT, SUBSEQUENT ENCOUNTER: ICD-10-CM

## 2023-11-13 DIAGNOSIS — S83.412D SPRAIN OF MEDIAL COLLATERAL LIGAMENT OF LEFT KNEE, SUBSEQUENT ENCOUNTER: ICD-10-CM

## 2023-11-13 DIAGNOSIS — S76.112D QUADRICEPS STRAIN, LEFT, SUBSEQUENT ENCOUNTER: ICD-10-CM

## 2023-11-13 DIAGNOSIS — M62.9 HAMSTRING TIGHTNESS OF BOTH LOWER EXTREMITIES: ICD-10-CM

## 2023-11-13 DIAGNOSIS — S89.92XD LEFT KNEE INJURY, SUBSEQUENT ENCOUNTER: ICD-10-CM

## 2023-11-13 DIAGNOSIS — M25.362 INSTABILITY OF LEFT KNEE JOINT: ICD-10-CM

## 2023-11-13 DIAGNOSIS — M21.70 LEG LENGTH DISCREPANCY: ICD-10-CM

## 2023-11-13 PROCEDURE — 2500000005 HC RX 250 GENERAL PHARMACY W/O HCPCS: Performed by: FAMILY MEDICINE

## 2023-11-13 PROCEDURE — 1036F TOBACCO NON-USER: CPT | Performed by: FAMILY MEDICINE

## 2023-11-13 PROCEDURE — 99214 OFFICE O/P EST MOD 30 MIN: CPT | Performed by: FAMILY MEDICINE

## 2023-11-13 PROCEDURE — 2500000004 HC RX 250 GENERAL PHARMACY W/ HCPCS (ALT 636 FOR OP/ED): Performed by: FAMILY MEDICINE

## 2023-11-13 PROCEDURE — 3044F HG A1C LEVEL LT 7.0%: CPT | Performed by: FAMILY MEDICINE

## 2023-11-13 PROCEDURE — 20611 DRAIN/INJ JOINT/BURSA W/US: CPT | Performed by: FAMILY MEDICINE

## 2023-11-13 PROCEDURE — 3080F DIAST BP >= 90 MM HG: CPT | Performed by: FAMILY MEDICINE

## 2023-11-13 PROCEDURE — 3077F SYST BP >= 140 MM HG: CPT | Performed by: FAMILY MEDICINE

## 2023-11-13 RX ORDER — BETAMETHASONE SODIUM PHOSPHATE AND BETAMETHASONE ACETATE 3; 3 MG/ML; MG/ML
12 INJECTION, SUSPENSION INTRA-ARTICULAR; INTRALESIONAL; INTRAMUSCULAR; SOFT TISSUE
Status: COMPLETED | OUTPATIENT
Start: 2023-11-13 | End: 2023-11-13

## 2023-11-13 RX ORDER — BUPIVACAINE HYDROCHLORIDE 2.5 MG/ML
3 INJECTION, SOLUTION EPIDURAL; INFILTRATION; INTRACAUDAL
Status: COMPLETED | OUTPATIENT
Start: 2023-11-13 | End: 2023-11-13

## 2023-11-13 RX ORDER — DEXAMETHASONE SODIUM PHOSPHATE 100 MG/10ML
5 INJECTION INTRAMUSCULAR; INTRAVENOUS
Status: COMPLETED | OUTPATIENT
Start: 2023-11-13 | End: 2023-11-13

## 2023-11-13 RX ORDER — LIDOCAINE HYDROCHLORIDE 20 MG/ML
3 INJECTION, SOLUTION INFILTRATION; PERINEURAL
Status: COMPLETED | OUTPATIENT
Start: 2023-11-13 | End: 2023-11-13

## 2023-11-13 RX ADMIN — LIDOCAINE HYDROCHLORIDE 3 ML: 20 INJECTION, SOLUTION INFILTRATION; PERINEURAL at 12:38

## 2023-11-13 RX ADMIN — BETAMETHASONE ACETATE AND BETAMETHASONE SODIUM PHOSPHATE 12 MG: 3; 3 INJECTION, SUSPENSION INTRA-ARTICULAR; INTRALESIONAL; INTRAMUSCULAR; SOFT TISSUE at 12:38

## 2023-11-13 RX ADMIN — BUPIVACAINE HYDROCHLORIDE 3 ML: 2.5 INJECTION, SOLUTION EPIDURAL; INFILTRATION; INTRACAUDAL; PERINEURAL at 12:38

## 2023-11-13 RX ADMIN — DEXAMETHASONE SODIUM PHOSPHATE 5 MG: 10 INJECTION, SOLUTION INTRAMUSCULAR; INTRAVENOUS at 12:38

## 2023-11-13 ASSESSMENT — PAIN - FUNCTIONAL ASSESSMENT: PAIN_FUNCTIONAL_ASSESSMENT: 0-10

## 2023-11-13 ASSESSMENT — PAIN DESCRIPTION - DESCRIPTORS: DESCRIPTORS: BURNING;SHARP

## 2023-11-13 NOTE — TELEPHONE ENCOUNTER
11/13/2023- Initiated benefits investigation via [id number] for viscosupplementation Injections into the patients LEFT KNEE.

## 2023-11-13 NOTE — PATIENT INSTRUCTIONS
Continue to alternate ice and moist heat as needed    Start into Physical Therapy 1-2 times a week for 8-10 weeks with manual therapy as well as dry needling and IASTM especially building up VMO strength   Stressed the importance of wearing shoes with good stability control to help with the biomechanics affecting the knees as well as the lower extremities    Stressed the importance of wearing full foot insoles to help with the biomechanics affecting the knees as well as the lower extremities, Recommendation over-the-counter calcium with vitamin-D 2 -3000+ milligrams a day, as well as OTC symphytum as directed daily to promote bony healing, in addition to a daily multivitamin.  Recommendation over-the-counter Move Free for joint health.    May take the following: Ibuprofen may alternate with OTC Tylenol Extra Strength or OTC Tylenol Arthritis, taking one every 6-8 hours with food as needed.    Patient advised regarding the risks and/or potential adverse reactions and/or side effects of any prescribed medications along with any over-the-counter medications or any supplements used. Patient advised to seek immediate medical care if any adverse reactions occur. The patient and/or patient(s) parent(s) verbalized their understanding,   Discussed in detail with the patient to the level of their understanding the possibility in the future of regenerative injections versus corticosteroid injections versus viscosupplementation injections versus a combination   Reviewed LEFT knee MRI in detail with the patient and/or patients parent/legal guardian to their level of understanding; a copy of these results were provided to the patient and/or patients parent/legal guardian at the time of this office visit. Discussed treatment options with the patient and/or patients parent/legal guardian in detail to the level of their understanding. The patient and/or patients parent/legal guardian verbalized their understanding and agreement to  the treatment plan at the time of this office visit.   Discussed with patient to possibility of surgical intervention in the future to repair meniscal injury.   Office made measurements for patellar reaction unloading knee brace.   Office to begin initiation for joint lubrication injections for pt LEFT knee.   At todays visit, office gave patient a cortisone injection into pts LEFT knee.   Follow up once approval for joint lubrication of patients LEFT knee or sooner as needed.

## 2023-11-14 NOTE — TELEPHONE ENCOUNTER
11/14/2023- Per [employer]-Patient has a Self Funded Commercial PPO Calendar Year with an effective  date of 04/17/2023. Plan follow [place] guidelines.  Effective 12/01/2017, Intra articular Hyaluronan injections of the knee are  considered NOT medically necessary and will no longer be covered with  [facility].  Administration 20610/20611 are covered at 80% of allowable amount.  Deductible has been met. Specialist office visits (if billed) are covered at 100%  of the allowable after a $40.0 copay. If out of pocket is met, coverage goes to  100% and copay will no longer apply. No pre-cert or referrals needed.  Medical notes may be requested at the time of claims processing. Include  medical necessity, diagnosis, and all clinicals.  As per rep, provider would be considered In-Network if contracted with the  Local [facility]. According to [facility] website provider is contracted.  Plan renews on 01/01/2024. Benefits for dates of service after 01/01/2024 may  vary. We recommend that you re-submit a benefit request for dates of service  after 01/01/2024.  Ref # [id number]    CLINICAL DOCUMENTATION UPLOADED-PENDING

## 2023-11-17 ENCOUNTER — APPOINTMENT (OUTPATIENT)
Dept: RADIOLOGY | Facility: CLINIC | Age: 53
End: 2023-11-17
Payer: COMMERCIAL

## 2023-11-22 NOTE — TELEPHONE ENCOUNTER
11/22/2023- As per Beronica HYATT @ [facility], ([phone]), Prescription is still being processed with [facility]. (Rhonda DEL RIO)    PENDING

## 2023-11-28 ENCOUNTER — EVALUATION (OUTPATIENT)
Dept: PHYSICAL THERAPY | Facility: CLINIC | Age: 53
End: 2023-11-28
Payer: COMMERCIAL

## 2023-11-28 ENCOUNTER — APPOINTMENT (OUTPATIENT)
Dept: SPORTS MEDICINE | Facility: CLINIC | Age: 53
End: 2023-11-28
Payer: COMMERCIAL

## 2023-11-28 DIAGNOSIS — M24.559 HIP FLEXOR TIGHTNESS, UNSPECIFIED LATERALITY: ICD-10-CM

## 2023-11-28 DIAGNOSIS — S83.8X2D INJURY OF MENISCUS OF KNEE, LEFT, SUBSEQUENT ENCOUNTER: ICD-10-CM

## 2023-11-28 DIAGNOSIS — S76.112D QUADRICEPS STRAIN, LEFT, SUBSEQUENT ENCOUNTER: ICD-10-CM

## 2023-11-28 DIAGNOSIS — M22.8X9 PATELLAR MALTRACKING, UNSPECIFIED LATERALITY: ICD-10-CM

## 2023-11-28 DIAGNOSIS — S89.92XD LEFT KNEE INJURY, SUBSEQUENT ENCOUNTER: ICD-10-CM

## 2023-11-28 DIAGNOSIS — M25.562 ACUTE PAIN OF LEFT KNEE: ICD-10-CM

## 2023-11-28 DIAGNOSIS — S76.312D HAMSTRING STRAIN, LEFT, SUBSEQUENT ENCOUNTER: ICD-10-CM

## 2023-11-28 DIAGNOSIS — M62.9 HAMSTRING TIGHTNESS OF BOTH LOWER EXTREMITIES: ICD-10-CM

## 2023-11-28 DIAGNOSIS — S83.412D SPRAIN OF MEDIAL COLLATERAL LIGAMENT OF LEFT KNEE, SUBSEQUENT ENCOUNTER: ICD-10-CM

## 2023-11-28 PROCEDURE — 97161 PT EVAL LOW COMPLEX 20 MIN: CPT | Mod: GP

## 2023-11-28 PROCEDURE — 97140 MANUAL THERAPY 1/> REGIONS: CPT | Mod: GP

## 2023-11-28 ASSESSMENT — ENCOUNTER SYMPTOMS
LOSS OF SENSATION IN FEET: 0
DEPRESSION: 0
OCCASIONAL FEELINGS OF UNSTEADINESS: 0

## 2023-11-28 ASSESSMENT — PAIN SCALES - GENERAL: PAINLEVEL_OUTOF10: 2

## 2023-11-28 ASSESSMENT — PAIN DESCRIPTION - DESCRIPTORS: DESCRIPTORS: DULL;ACHING

## 2023-11-28 NOTE — TELEPHONE ENCOUNTER
11/28/2023- Patient called and stated he received a letter in the mail with his APPROVAL for SUPARTZ INJECTIONS. Patient stated his approval date is from 11/20/2023-11/24/2024. Patient wanted to get scheduled for his appointments and stated he would bring in his letter for us to scan it into his chart. Patient is scheduled 1x a week for 5 weeks for SUPARTZ INJECTIONS into his LEFT KNEE.

## 2023-11-28 NOTE — PROGRESS NOTES
Physical Therapy Evaluation and Treatment      Patient Name: Narciso Pardo  MRN: [id number]  Today's Date: 11/28/2023  Time Calculation  Start Time: 1000  Stop Time: 1045  Time Calculation (min): 45 min    PT Low Complexity Eval Time: 30mins    Current Problem:   1. Acute pain of left knee  Referral to Physical Therapy    Follow Up In Physical Therapy      2. Sprain of medial collateral ligament of left knee, subsequent encounter  Referral to Physical Therapy    Follow Up In Physical Therapy      3. Left knee injury, subsequent encounter  Referral to Physical Therapy    Follow Up In Physical Therapy      4. Injury of meniscus of knee, left, subsequent encounter  Referral to Physical Therapy    Follow Up In Physical Therapy    Horizontal tear of the body and posterior horn segments of the  medial meniscus.      5. Hamstring strain, left, subsequent encounter  Referral to Physical Therapy    Follow Up In Physical Therapy      6. Quadriceps strain, left, subsequent encounter  Referral to Physical Therapy    Follow Up In Physical Therapy      7. Patellar maltracking, unspecified laterality  Referral to Physical Therapy    Follow Up In Physical Therapy      8. Hamstring tightness of both lower extremities  Referral to Physical Therapy    Follow Up In Physical Therapy      9. Hip flexor tightness, unspecified laterality  Referral to Physical Therapy    Follow Up In Physical Therapy          Subjective  /General:  General  Reason for Referral: L knee pain  Referred By: Douglas Heart DO  General Comment: Visit #1 (20/yr with 13used previously)  Patient reported hx of current condition: The pt states that in March of 2023, he was bowling 2 days in a row and then the next day, it was bothering him. He notes that the L LE is his pivot/slide leg. He did try to come for PT for the knee, but during that time, his back starting acting up and that was more prominent, so the PT focused on his low back. He reports that his back is  well controlled at this time. His knee has continued to worsen over time. He did have a cortisone injection recently and that helped. He also reports that he received the brace yesterday and notes that it is very bulky, so he hasn't worn it yet.    Pt has continued to do a VMO biased LAQ and SLR at home (from previous PT) also has some low back HEP that he has continued.     Pt reports + hx of clicking, popping, but denies locking and catching    Aggravating factors: side to side movements when standing, prolonged standing/walking >1 hour, walking downhill, descending stairs, quicker movements  Relieving factors: rest, icing      Relevant Imaging:  MR knee left wo IV contrast    Result Date: 11/10/2023  FINDINGS: Menisci:  Lateral meniscus is intact. Horizontal tear of the body and posterior horn segments of the medial meniscus with fluid signal reaching the inferior surface..   Cruciate ligaments: Intact.   Collateral ligaments: Intact.   Tendons:  Intact.   Muscles: Intact.   Bones:  No evidence of acute fracture. Bone marrow signal intensity is within normal limits. Sequela of chronic Osgood-Schlatter with corticated prominence of the tibial tubercle.   Articular cartilage: Lateral compartment: Intact. Medial compartment: Mild thinning of the posterior weight-bearing medial femoral condyle involving a small area of proximally about 14 mm AP. Medial tibial plateau cartilage is intact. Patellofemoral compartment: Focal near full-thickness chondral loss of superior aspect of the medial trochlear groove with surrounding areas of delamination.   Miscellaneous: A small knee joint effusion is noted. A small Baker's cyst is noted..       1. Horizontal tear of the body and posterior horn segments of the medial meniscus. 2. Mild medial and patellofemoral compartment degenerative changes with chondral loss as described above.   MACRO: None.   Signed by: Clary Ortiz 11/10/2023 10:15 AM Dictation workstation:    "[id number]     Precautions:  Precautions  STEADI Fall Risk Score (The score of 4 or more indicates an increased risk of falling): 3  Precautions Comment: none  Pain:  Pain Assessment  Pain Score: 2  Pain Type: Chronic pain  Pain Location: Knee  Pain Orientation: Left, Other (Comment) (medial)  Pain Radiating Towards: none  Pain Descriptors: Dull, Aching (can be sharp with movements)  Home Living:  Home Living Comment: Pt lives in a multistory home with handrails on the stairs   Prior Level of Function:  Prior Function Per Pt/Caregiver Report  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 Mildly antalgic gait pattern on the L LE.     Lumbar AROM Range   Flexion No limitation    Extension 25% limitation \"stiff\"   R sidebend 50% limitation \"stiff\"   L sidebend 50% limitation \"stiff\"      Hip AROM L R   Flexion ~120 deg ~120 deg   External Rotation ~40 deg ~40 deg   Internal Rotation ~10 deg ~10 deg      Knee AROM L R   Flexion 120 deg *at end range 120 deg   Extension 0 deg 0 deg      Hip MMT L R   Hip Flexion 5/5 5/5   Hip Extension 4+/5 4+/5   Hip Abduction 4+/5 4+/5   Hip Adduction 4/5 4/5   External Rotation 5/5 4+/5* knee   Internal Rotation 5/5 4+/5* knee      Knee MMT L R   Knee Flexion 5/5 5/5   Knee Extension 5/5 5/5*      Ankle MMT L R   Dorsiflexion 5/5 5/5   Plantarflexion 5/5 5/5      Special Tests: +/-   Slump -   Scour -   FADIR -   ALEXANDRE -   McMurrarys +on R for pain (worse with tibial ER)   Anterior drawer -   Posterior drawer - for laxity, pt reports some discomfort   Valgus - for laxity, + for pain " "  Varus -      Outcome Measures:  Other Measures  Other Outcome Measures: WOMAC: 41/96     Treatments:  Therapeutic Exercise  Therapeutic Exercise Activity 1: Verbally reviewed and pt provided demo of current HEP - SLR with ER, LAQ with ER, hamstring stretch, and piriformis stretch  Therapeutic Exercise Activity 2: Modification of SLR with ER made: QS and 3-5\" hold times x5reps  Therapeutic Exercise Activity 3: Supine bridge with 3-5\" hold x3    Manual Therapy  Manual Therapy Activity 1: STM/MFR through the pes anserine tendons and along medial tibiofemoral joint line  Manual Therapy Activity 2: Supine patellar mobilizations - Sup/inf and med/lat (grade III-IV)    Manual Time: 10mins  Ther-ex Time: 5mins    OP EDUCATION:  Outpatient Education  Individual(s) Educated: Patient  Education Provided: Anatomy, Home Exercise Program, POC  Risk and Benefits Discussed with Patient/Caregiver/Other: yes  Patient/Caregiver Demonstrated Understanding: yes  Plan of Care Discussed and Agreed Upon: yes  Patient Response to Education: Patient/Caregiver Verbalized Understanding of Information, Patient/Caregiver Performed Return Demonstration of Exercises/Activities, Patient/Caregiver Asked Appropriate Questions  Education Comment: [facility] [employer] Access Code: [email or web address]    HEP to be completed daily; exercises include:  SLR with ER with 3-5\" hold  LAQ with ER with 3-5\" hold  Bridge with 5\" hold  Seated hamstring stretch  Supine modified piriformis stretch    Assessment:  PT Assessment Results: Decreased strength, Decreased range of motion, Pain  Rehab Prognosis: Good    Pt is a 53 y.o. Male who presents with signs and symptoms consistent with degenerative changes to the L knee as well as medial meniscal pathology. The current impairments have led to functional limitations that include: working, prolonged walking/standing, and descending stairs. Pt would benefit from skilled physical therapy intervention to improve impairments and facilitate " "return to prior function.    Plan:  Treatment/Interventions: Aquatic therapy, Cryotherapy, Dry needling, Education/ Instruction, Electrical stimulation, Gait training, Hot pack, Laser, Manual therapy, Neuromuscular re-education, Taping techniques, Therapeutic activities, Therapeutic exercises, Ultrasound  PT Plan: Skilled PT  PT Frequency:  (1-2x/wk)  Duration: 12wks  Onset Date: 11/28/23  Certification Period Start Date: 11/28/23  Certification Period End Date: 02/26/24  Number of Treatments Authorized: 10 then recheck - allowed 20/yr with 13 used on previous POC  Rehab Potential: Good  Plan of Care Agreement: Patient    Goals:  Active       PT Problem       PT STG       Start:  11/28/23    Expected End:  01/12/24       - Pt will complete the HEP with <3 verbal cues for correction  - Pt will demonstrate 2pt improvement on the NPRS, allowing for improved tolerance of functional activities.  - Pt will demonstrate ability to perform 5 step downs from 6\" step with 1 UE support in order to demonstrate improved ability to descend stairs.           PT LTG       Start:  11/28/23    Expected End:  02/26/24       - Pt will be independent in HEP & symptom management  - Pt will demonstrate a 4 pt improvement for knee pain on the NPRS, allowing for improved tolerance to perform daily functional activities.   - Pt will demonstrate at least 120deg knee flexion AROM without onset of pain, allowing for a normal pattern with performance of stairs.  - Pt will demonstrate ability to perform 10 sit to stands without UE use in order to demonstrate improved functional LE strength and improved independence with functional mobility  - Pt will demonstrate ability to perform 10 step downs from 8\" step with 1 UE support and without aggravation of symptoms in order to demonstrate improved ability to descend stairs.  - Pt will demonstrate 5/5 MMT grading throughout hip/knee musculature, allowing for appropriate muscle recruitment during daily " "activities.   - Pt will demonstrate normal mechanics without pain during gait and performance of stairs, allowing for pt to return to navigating the community.  - Pt will perform tandem stance >30\" with EO leading with ea LE in order to demonstrate improved proprioception and stability.  - Pt will demonstrate improved WOMAC score by 11 points (MCID) in order to demonstrate improved functional mobility.            Patient Stated Goal 1       Start:  11/28/23    Expected End:  02/26/24       \"Get rid of pain\"                    "

## 2023-11-28 NOTE — PROGRESS NOTES
Verbal consent of the patient and/or verbal parental consent for patients under the age of 18 have been obtained to conduct a physical examination at this office visit.    Established patient  History Of Present Illness  11/28/23 Narciso Pardo is a 53 y.o. male who presents for Supartz Injection 1/5 injection into the patients LEFT knee. He states that he is feeling better s/p cortisone injection and pain has improved significantly. He continues to c/o dull achy pain along the medial joint line of his LEFT knee. He has started into physical therapy and is performing his home exercise program as previously instructed. He denies any instability, locking, catching, numbness/tingling, or swelling at this time.   He is taking all supplements as previously directed and is wearing shoes with good support as well as OTC insoles as previously recommended.  He rates pain at 2/10 today in his LEFT knee.     Last Recorded Vitals  There were no vitals taken for this visit.     All previous Progress Notes and imaging results related to this patients chief complaint have been reviewed in preparation for this examination.    Past Medical History  He has a past medical history of Fatty (change of) liver, not elsewhere classified, Other conditions influencing health status, Other obesity (05/11/2017), Other obesity (03/30/2018), Personal history of other diseases of the musculoskeletal system and connective tissue, Personal history of other diseases of the musculoskeletal system and connective tissue, Personal history of other mental and behavioral disorders, and Type 2 diabetes mellitus without complications (CMS/[facility]) (02/03/2017).    Surgical History  He has a past surgical history that includes Shoulder surgery (Left); Wrist surgery (Left); and Carpal tunnel release (Right).     Social History  He reports that he has never smoked. He has never used smokeless tobacco. He reports current alcohol use. He reports that he does not  currently use drugs.    Family History  Family History   Problem Relation Name Age of Onset    Diabetes Mother      Diabetes Father          Allergies  Patient has no known allergies.    Historical Clinical Intake  May 10, 2023--- EAE --- Verbal consent of the patient and/or verbal parental consent for patients under the age of 18 have been obtained to conduct a physical examination at this office visit. Newly established patient. Pt is a 53yr old male, who works as a . He has been graciously referred to this office by Dr. Villalobos for LEFT knee pain that has been bothering him for a month. No known trauma or injury. 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 By Monday, he noticed pain that started in the back of his knee and then moved to the inside. Pain with flexion, extension and squatting. Pt notes that his pain has caused him to start walking with a limp. He purchased a compression knee sleeve as well as alternating between Aleve and Ibuprofen with no relief in pain prior to seeing his primary care provider. Upon suggestion, patient has recently purchased a hinged knee brace and has been taking Prednisone as prescribed by his primary care provider. He believes that both have currently helped provide some relief in his pain. Rates current pain as a 5/10 describing it as a constant ache, though prior to the new brace and Prednisone, his pain was a 7/10. Denies any numbness or tingling. No previous history of injury.  ---------------------------------------  June 29, 2023 Above note reviewed. Verbal consent of the patient and/or verbal parental consent for patients under the age of 18 have been obtained to conduct a physical examination at this office visit. Narciso returns today for his follow up LEFT knee. Serg presents today with increased pain to LEFT medial knee and additionally LEFT lumbar pain. His gait is ataxic, although he  isn't using any assistance devices. He rates his LEFT knee pain 6/10 described as aching and his Left LUMBAR pain as 8/10 described as burning and stabbing. He has not started physical therapy yet, he was out of town and he plans on starting soon. He is wearing a copper knee brace for additional support and he does take the Move free supplement. He adds that he saw Dr Villalobos for his Lumbar pain and he did some xrays and put him on Prednisone, which did not help. Serg denies and tingling or numbness. He takes ibuprofen for pain and he applies ice for some relief. We discussed treatment options. Patient continues to show indications of a knee sprain for which I recommended that he wear a hinged metal brace. Patient was using an over-the-counter hinged middle brace but states that it did not of the any durability and fell apart. As such we will fit patient for playmaker brace for increased ability under ability for his knee and he will start physical therapy as soon as possible. Patient also complaining of low back pain which may be exacerbated by his knee sprain after exam there indications of a low back strain we will amend his physical therapy order to include as lower back and can re-evaluate at his follow-up appointment. Patient verbalizes understanding and agreement with plan of care.  ---------------------------------------  July 5, 2023. HW  Verbal consent of the patient and/or verbal parental consent for patients under the age of 18 have been obtained to conduct a physical examination at this office visit. This 53 year old male presents with left sided low back pain. He presents today with a limp. He states he was here last Thursday for an appointment for his left knee. He mentioned at the appointment that his low back was bothering him. He states a couple weeks ago he went to [place] to [facility] and when he came home, his low back pain started. He was given a Toradol and Solumedrol injection and he was feeling  better Friday morning when he woke up. He states Friday morning he fell down 6 steps on his buttocks, mostly on his left side. He states he also hit his left knee when he fell, but he is no longer having pain in his knee. He states he has pain all the time, with no relief. He has been taking Cyclobenzaprine and Diclofenac with no relief. He hasn't been able to sleep well due to his pain. He states he has more pain with back extension than back flexion. He denies any radiculitis. He was unable to go to work this weekend. 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 He iced, heated, took medications and tried stretching, but he is still in pain. He rates his pain at 8/10 and describes it as sharp. He is supposed to work the next 3 nights, but doesn't think is able to.  ---------------------------------------  August 2, 2023 Above notes reviewed. Verbal consent of the patient and/or verbal parental consent for patients under the age of 18 have been obtained to conduct a physical examination at this office visit. Narciso returns today for his follow up of his LOW BACK. He states that he is feeling slightly improved since his previous visit. Rates current pain as a 3.5/10 describing it as a stiffness. Pt has remained out of work since last seen in office. He has completed seven sessions of physical therapy since last visit and feels that it has been helping with his pain. We discussed treatment options. Patient continues to have point tenderness over the lower lumbar spine as well as significant restriction especially with extension lateral flexion and rotation. Patient continues to complain of pain although he states that is improved. Patient has only completed 3 weeks of physical therapy and as such we will continue with current treatment options for another 3-4 weeks and re-evaluate at that time. If patient continues to have pain tenderness and restriction we will consider an MRI at that time.  "Patient verbalizes understanding and agreement with plan of care.  ---------------------------------------  August 30, 2023 Above notes reviewed. Verbal consent of the patient and/or verbal parental consent for patients under the age of 18 have been obtained to conduct a physical examination at this office visit. Narciso returns today for his follow up of his low back. Pt states that he is feeling better since his previous visit. He feels that his back is back to normal but his knee pain comes and goes. Rates current back pain as a 2/10 describing it as stiff and his knee pain as a 3/10 describing it as a \"nagging\" pain. He continues with physical therapy with about six sessions left, noting that he feels it is helping his back more than his knee. Pt feels that he can return back to work and will bring in paperwork to be completed to document such. We discussed treatment options. Patient has been doing well with his physical therapy and states that he is feeling largely back to normal. Upon exam patient does continue to have point tenderness over the lumbar spine specifically the L3 vertebrae and associated paraspinal muscles. As such we will order an MRI to better evaluate for any underlying pathology. Patient can return to work without restrictions and we can reevaluate after his MRI or as symptoms change. Patient verbalizes understanding and agreement with plan of care.  ---------------------------------------  September 26, 2023 Above notes reviewed. Verbal consent of the patient and/or verbal parental consent for patients under the age of 18 have been obtained to conduct a physical examination at this office visit. Previously established patient. Narciso returns today for his MRI follow up of his LUMBAR spine. States that his back is feeling somewhat better rating pain as a 2/10 describing it as stiffness. His knee however is worse, rating it as a 6/10. Pt has started and is continuing with physical therapy for " his knee. He has been wearing the brace and notes that it is supportive but not making it feel better. We reviewed patient MRI results in detail. Patient verbalizes understanding of results. We discussed treatment options and will refer patient to Dr. Sosa for surgical consult and evaluation as recommended by radiology. Patient can follow-up after surgical consult for reevaluation of the knee and we can discuss treatment options at that time such as physical therapy or more advanced imaging. Patient verbalizes understanding and agreement with plan of care.  ---------------------------------------  11/2/2023---Narciso Pardo is a 53 y.o. male presenting with re-injury of his LEFT knee. Unknown trauma or injury. He works at [employer] and is on his feet for twelve hours a day. Pain has worsened over the past two weeks to the point that it is making it difficult to walk. Pain also is disrupting sleep at night. He states that his pain primarily in the medial aspect of his knee. Pain with all range of motion. He does continue to wear the brace he was previously fitted for in this office and notes that while it does support his knee, it does nothing for his pain. Rates current pain as a 8/10 describing it as a constant throbbing pain with sitting and a shocking pain with movement. Pt continues to Move Free and NSAIDs for joint pain over the past month with no improvement in pain. Pt did physical therapy last time he was seen and notes that it did not make any improvements with his pain in his knee which was last done in early September.  ---------------------------------------   11/13/23 Narciso Pardo is a 53 y.o. male who presents to review his LEFT knee MRI. He completed Physical Therapy in September 2023. He continues to perform his home exercise program previously provided to him by Physical Therapy. He describes his pain as sharp and burning along the medial aspect of his left knee. He notes pain  exacerbates when transitioning from sitting to standing, with ambulation and throughout the evening. He states stretching and stairs in both directions also increase pain. He cites pain disturbs his sleep. He walks with a myopathic gait. Narciso works 12 hour swing shifts at [employer] on a [employer] motor and states he has been unable to work a full shift since returning to work in September. He tells that he works on concrete floor and consistently goes up and down on ladders. He rates his pain at 7/10 today, but states pain reaches 10/10 daily. He states that his pain is constant. He continues to take previously prescribed Prednisone and takes OTC Ibuprofen, applies OTC Voltaren gel and ice with no relief. He has also worn previously supplied Playmaker knee brace but says his pain increased with use so he stopped wearing it. 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  He would like a cortisone shot if possible.     Review of Systems:  CONSTITUTIONAL:   Negative for weight change, loss of appetite, fatigue, weakness, fever, chills, night sweats, headaches .           HEENT:   Negative for cold, cough, sore throat, sinus pain, swollen lymph nodes.           OPHTHALMOLOGY:   Negative for diminished vision, blurred vision, loss of vision, double vision.           ALLERGY:   Negative for runny nose, scratchy throat, sinus congestion, rash, facial pressure, nasal congestion, post-nasal drip.           CARDIOLOGY:   Negative for chest pain, palpitations, murmurs, irregular heart beat, shortness of breath, leg edema, dyspnea on exertion, fatigue, dizziness.           RESPIRATORY:   Negative for chest pain, shortness of breath, swelling of the legs, asthma/copd, chest congestion, pain with breathing .           GASTROENTEROLOGY:   Negative for nausea, vomitting, heartburn, constipation, diarrhea, blood in stool, change in bowel habits,  black stool.           HEMATOLOGY/LYMPH:   Negative for fatigue, loss of appetitie, easy bruising, easy bleeding, anemia, abnormal bleeding, slow healing.           ENDOCRINOLOGY:   Negative for polyuria, polydipsia, polyphagia, fatigue, weight loss, weight gain, cold intolerance, heat intolerance, diabetes.           MUSCULOSKELETAL:   Positive  for LEFT Knee pain        DERMATOLOGY:   Negative for rash, bruising.           NEUROLOGY:   Negative for tingling, numbness, gait abnormality, paresthesias, weakness, sciatica.         General Examination:  GENERAL APPEARANCE: Appears well, pleasant, and cooperative, NAD.   HEENT: Normal, unremarkable.   NECK: Supple.   HEART: RRR, normal S1S2.   LUNGS: Clear to auscultation bilaterally.   ABDOMEN: Soft, NT/ND, BS present.   EXTREMITIES: No cyanosis, clubbing.   SKIN: No rash or cellulitis.   NEUROLOGIC EXAM: Awake, A&O x 3, CN's II-XII grossly intact.   PSYCH: Good eye contact, appropriate mood and affect      Examination: All findings improved since corticosteroid injection and last visit  Left  Knee  Edema Negative  Effusion: Negative.   Percussion Test:  Positive    Tuning Fork Test:  Positive    Ecchymosis/Bruising: Negative.            Palpation: All findings improved since corticosteroid injection and last visit  Positive Tender to palpation body and posterior joint line medial meniscus LEFT       Orientation:  Asymmetrical: because of the swelling. All findings improved since corticosteroid injection and last visit           Range of Motion: All findings improved since corticosteroid injection and last visit  Positive Knee Flexion ( 0-135 degrees) still minimally decreased   Positive Knee Extension ( 0-15 degrees) still minimally decreased      Muscle Strength:  All findings improved since corticosteroid injection and last visit  Positive +4-+5/+5 Quadricep Extension Causes pain  Positive +4-+5/+5  Hamstring Flexion Causes pain          +5+5 Gastrocnemius  +5+5  Soleus.            Proprioception:      All findings improved since corticosteroid injection and last visit  Pain with Squat: Positive    Pain with Sumo Squat:  Positive    Hop Test: Positive    Single leg balance test Positive            Vascular:   +2/+4 Femoral  +2/+4 Dorsalis Pedis  +2/+4 Posterior Tibial  Capillary Refill less than 2 seconds.     Diagnostic studies:  MRI of the left knee without contrast      INDICATION:  Signs/Symptoms:LEFT KNEE PAIN      COMPARISON:  None.      ACCESSION NUMBER(S):  [id number]      ORDERING CLINICIAN:  JODIE TORRE      TECHNIQUE:  Multiplanar multisequence MRI of the left knee was performed without  intravenous contrast.      FINDINGS:  Menisci:  Lateral meniscus is intact. Horizontal tear of the body and  posterior horn segments of the medial meniscus with fluid signal  reaching the inferior surface..      Cruciate ligaments: Intact.      Collateral ligaments: Intact.      Tendons:  Intact.      Muscles: Intact.      Bones:  No evidence of acute fracture. Bone marrow signal intensity  is within normal limits. Sequela of chronic Osgood-Schlatter with  corticated prominence of the tibial tubercle.      Articular cartilage:  Lateral compartment: Intact.  Medial compartment: Mild thinning of the posterior weight-bearing  medial femoral condyle involving a small area of proximally about 14  mm AP. Medial tibial plateau cartilage is intact. Patellofemoral  compartment: Focal near full-thickness chondral loss of superior  aspect of the medial trochlear groove with surrounding areas of  delamination.      Miscellaneous: A small knee joint effusion is noted. A small Baker's  cyst is noted..      IMPRESSION:  1. Horizontal tear of the body and posterior horn segments of the  medial meniscus.  2. Mild medial and patellofemoral compartment degenerative changes  with chondral loss as described above.  3. Small Bakers cyst   4. Small joint effusion  5. Chronic Osgood Schlatter  disease        ------------------------------------------------------------------------     PROCEDURE:  KNEE LT MIN 4 VIEW - TXR  [id number]  REASON FOR EXAM: ACUTE PAIN OF LEFT KNEE  RESULT: MRN: [id number]     Patient Name: FARZANA RUSH   STUDY: KNEE LT MIN 4 VIEW 5/10/2023 8:35 am  INDICATION: ACUTE PAIN OF LEFT KNEE 53-year-old man with left knee pain for 1 month, medially. No known injury  COMPARISON: Left tibial radiograph 02/03/2017     ACCESSION NUMBER(S): [id number]   ORDERING CLINICIAN: JODIE TORRE     TECHNIQUE: Four views of the left knee were performed.     FINDINGS:  No cortical irregularity or lucency is identified to suggest acute fracture or dislocation of the left knee. There is mild tricompartmental joint space narrowing with small osteophyte formation. No significant suprapatellar knee joint effusion. Prominent enthesophyte at the tibial tubercle. Small enthesophytes superiorly and inferiorly at the patella.     IMPRESSION:  Degenerative changes of the left knee, as detailed above. No sign of acute fracture or significant suprapatellar knee joint effusion. If symptoms persist, consider MRI for further evaluation.     Dictation workstation:   [id number]   Original Interpreting Physician:   FRANCESCA HOWELL MD  Original Transcribed by/Date: MMODAL May 10 2023  8:13A        Knee - ACL:All findings improved since corticosteroid injection and last visit  Anterior Drawer Test: Positive    Lachman Test: Positive       Lelli Test:  Positive       KNEE - MCL / LCL:All findings improved since corticosteroid injection and last visit  Valgus Stress Test:  90 degrees: Negative, 20-30 degrees: Negative.   Varus Stress Test:  90 degrees: Positive  20-30 degrees: Positive   Apley Distraction Test: Positive Lateral.   Thessaly Test: Positive Anteior Lateral Meniscus, Posterior Lateral Meniscus, Origins and Insertion LCL, Distal Hamstring.         KNEE - IT BAND:All findings improved since corticosteroid  injection and last visit  Cayla Test: Negative.   Noble Test: Negative.          KNEE - MENISCUS:All findings improved since corticosteroid injection and last visit  Apley Compression Test: Positive  Lateral joint line.   Stienmann Test:  Positive    Terrance Test: Positive  Lateral joint line.   Bounce Home Test:  Positive   Thessaly Test:  Positive Anterior Lateral Joint Line Pain and Posterior Lateral Joint Line Pain.            KNEE - PATELLA:All findings improved since corticosteroid injection and last visit  Apprehension Test:  Positive   Glide Test:  Positive   Grind Test: Positive   Patella Tracking Test: Positive               KNEE - QUADRICEPS:    VMO Test: Positive    VLO Test:  Negative.              Feet/Foot: Positive  BILATERAL  Valgus foot     Assessment   1. Acute pain of left knee        2. Sprain of medial collateral ligament of left knee, subsequent encounter        3. Left knee injury, subsequent encounter        4. Injury of meniscus of knee, left, subsequent encounter        5. Instability of left knee joint        6. Hamstring strain, left, subsequent encounter        7. Quadriceps strain, left, subsequent encounter        8. Patellar maltracking, unspecified laterality        9. Hamstring tightness of both lower extremities        10. Hip flexor tightness, unspecified laterality        11. Leg length discrepancy        12. Valgus deformity of both feet            Procedure   Time Out (5 Minutes): Yes  Patient Name: Narciso Pardo  YOB: 1970  Procedure: Supartz Injection 1/5  Needed Supplies Available: Correct Supplies  Laterality: Left Knee  Site Verified and Marked: Correct Site(s) Marked  Timeout Performed by Provider: Dr. Douglas Heart, D.O.  Staff Initials: EH    L Inj/Asp: L knee on 11/30/2023 3:32 PM  Indications: pain  Details: 22 G needle, ultrasound-guided lateral approach  Medications: 20 mg sodium hyaluronate 10 mg/mL  Outcome: tolerated well, no immediate  complications  Procedure, treatment alternatives, risks and benefits explained, specific risks discussed. Consent was given by the patient. Immediately prior to procedure a time out was called to verify the correct patient, procedure, equipment, support staff and site/side marked as required.            Patient is informed and consent has been signed by the patient if over 18 years old., Patient parent and/or legal guardian is informed and consent has been signed., Patient and/or parent and/or legal guardian accept all risks, benefits, and possible complications associated with procedure(s) and/or manipulation(s) and/or injection(s)., Patient and/or parent and/or legal guardian deny patient allergy or known complications with any of the medications, instruments, products, and/or techniques used., All questions and concerns were answered and/or addressed with the patient and/or parent and/or legal guardian., The patient and/or parent and/or legal guardian agree to proceed with the procedure(s) and/or manipulation(s) and/or injection(s)., Prep: The area was cleansed with a mixture of equal parts rubbing alcohol 70% and Hibclens., Utilizing clean technique, the injection site(s) were marked with a skin marker., and Injection site(s) were anesthestized with topical analgesic spray prior to injection.    Performed regenerative Supartz Injection 1/5 into the patients Left Knee, under ultrasound.  Supartz FX (25mg/2.5ml)     Band-aid and/or compressive bandage was placed over the injection site(s). After the injection(s) performed osteopathic manipulation therapy to the affected area(s) to help increase blood flow to aid with the healing process as well as circulation of the medication. The patient tolerated the procedure well without any complications. The patient was instructed to gently massage the treatment site(s) as well as to apply moist heat to the affected area(s). Additionally, the patient was instructed to  contact the office immediately with any complications or concerns.    The Nurse, Char was present in the room during the entire procedure.    The following discharge instructions were reviewed in detail with the patient to the level of their understanding:    PAIN:  A mild to moderate amount of discomfort, tenderness, stiffness, and achiness-caused by the inflammation of the area can be expected for a few days after the injection. This is normal and good as the inflammation is an important part of the healing process.    SKIN: Due to the numbing spray used, you may develop a red, itchy, scaly rash in the area that was sprayed. Should your skin become itchy, wash the area with mild soap and warm water. If needed, you may apply topical over-the-counter Hydrocortisone cream to alleviate any itchiness. AVOID scratching due to risk of infection.,     BATHING/SWIMMING: You may shower after your procedure with regular soap and water, but no baths, no swimming, and no hot tubs for 3-4 days after the procedure.,     ACTIVITIES:  Immediately following the injection, you may resume daily activities (such as work) and light exercise. We suggest that you refrain from strenuous activity and heavy lifting, particularly the injured body part, for a week post-procedure, but sometimes this can change as well.    MOIST HEAT/ICE:  Moist heat may be used on the injection area. NO ICE.  MEDICATION: You may continue your prescribed medications and any over-the-counter supplements; however, it is not recommended to use aspirin or anti-inflammatories (Motrin, Advil, Aleve, Ibuprofen, Naproxen etc.) for up to 2 weeks post procedure. Tylenol Extra Strength, Tylenol Arthritis and/or any prescribed narcotics or muscle relaxants are OK to take.    APPOINTMENTS: You should have a follow-up appointment with Dr. Heart scheduled 1-3 weeks as recommended after your procedure for your next round of injections or to follow-up after you have completed  your injection series. Please schedule your follow-up appointment on your way out after your procedure, or call the office to schedule these appointments as soon as possible.    PRECAUTIONS: Smoking, caffeine, alcohol, sex and anti-inflammatories post-procedure may reduce the effectiveness of Prolotherapy/Neural Prolotherapy/Prolozone injections. Early, rare post procedure problems that can be concerning are as follows: an increase in pain not relieved by medication (over the counter or prescription), a temperature above 101 degrees, bleeding or progressive, extreme swelling, or numbness.     CALL THE OFFICE OR GO TO THE EMERGENCY ROOM IF ANY OF THESE SYMPTOMS OCCUR.   If you have any questions or problems, please call our office at [phone]     Treatment or Intervention:  Continue to alternate ice and moist heat as needed    Continue physical Therapy 1-2 times a week for 8-10 weeks with manual therapy as well as dry needling and IASTM especially building up VMO strength   Once again stressed the importance of wearing shoes with good stability control to help with the biomechanics affecting the knees as well as the lower extremities    Once again stressed the importance of wearing full foot insoles to help with the biomechanics affecting the knees as well as the lower extremities, Recommendation over-the-counter calcium with vitamin-D 2 -3000+ milligrams a day, as well as OTC symphytum as directed daily to promote bony healing, in addition to a daily multivitamin.    Once again recommendation over-the-counter Move Free for joint health.    May continue to take the following: Ibuprofen may alternate with OTC Tylenol Extra Strength or OTC Tylenol Arthritis, taking one every 6-8 hours with food as needed.    Patient advised regarding the risks and/or potential adverse reactions and/or side effects of any prescribed medications along with any over-the-counter medications or any supplements used. Patient advised to  seek immediate medical care if any adverse reactions occur. The patient and/or patient(s) parent(s) verbalized their understanding,   Discussed in detail with the patient to the level of their understanding the possibility in the future of regenerative injections versus corticosteroid injections versus viscosupplementation injections versus a combination  Went over home exercises to be done by the patient regularly  Discussed with patient to possibility of surgical intervention in the future to repair meniscal injury.  However he is against surgical intervention at this time  Performed injection of Supartz #1 into patient's LEFT knee under ultrasound. TREATMENT PLAN: The patient tolerated the procedure well and without any adverse effects. Discharge instructions for regenerative/viscosupplementation/corticosteroid injections were reviewed in detail with the patient to the level of their understanding; a copy of these instructions were provided to the patient at the time of this office visit.   Follow up in 1 week for Supartz #2 in LEFT knee or sooner as needed.     Diagnostic studies:  No additional imaging or laboratory studies are needed at this time.    Activity Instructions, Restrictions, and Accommodations:  No activity limitations or modifications are needed at this time.    Consultations/Referrals:  None at this time.    Follow-up 1 week for Supartz #2 of the patients Left Knee or sooner as needed.     BING MAURO on 11/28/23 at 2:08 PM.     Douglas Heart DO, FAOASM

## 2023-11-28 NOTE — PROGRESS NOTES
"Physical Therapy Evaluation and Treatment      Patient Name: Narciso Pardo  MRN: [id number]  Today's Date: 2023       PT *** Complexity Eval Time: ***    Current Problem:   No diagnosis found.    Subjective  /General:     Patient reported hx of current condition: ***    Relevant Imagin23 L knee MRI  \"IMPRESSION:  1. Horizontal tear of the body and posterior horn segments of the  medial meniscus.  2. Mild medial and patellofemoral compartment degenerative changes  with chondral loss as described above.\"    23 Lumbar MRI  \"IMPRESSION:  Degenerative disc disease and spondylosis most severe at L3-L4 with  mass-effect upon the cauda equina and complete obliteration of the thecal  sac. Clinical correlation is recommended. Orthopedic surgery spine  consultation or neurosurgery consultation may be considered if clinically  Warranted\"    Precautions:     {CNA PT LQ PRECAUTIONS:58030}  Pain:     Home Living:     Prior Level of Function:       Objective   Posture:     Range of Motion:     Strength:     Flexibility:     Palpation:     Special Tests:     Gait:     Balance:     Stairs:     Bed Mobility:     Transfers:     Other:       {Spine,UE,LE,HAND,LYMPHEDEMA:86882}    Lumbar AROM Range   Flexion ***   Extension ***   R sidebend ***   L sidebend ***      Hip AROM L R   Flexion *** deg *** deg   External Rotation *** deg *** deg   Internal Rotation *** deg *** deg      Knee AROM L R   Flexion *** deg *** deg   Extension *** deg *** deg      Ankle AROM L R   Dorsiflexion *** deg *** deg   Plantarflexion *** deg *** deg   Eversion *** deg *** deg   Inversion *** deg *** deg      Hip MMT L R   Hip Flexion ***/5 ***/5   Hip Extension ***/5 ***/5   Hip Abduction ***/5 ***/5   Hip Adduction ***/5 ***/5   External Rotation ***/5 ***/5   Internal Rotation ***/5 ***/5      Knee MMT L R   Knee Flexion ***/5 ***/5   Knee Extension ***/5 ***/5      Ankle MMT L R   Dorsiflexion ***/5 ***/5   Plantarflexion ***/5 " ***/5   Eversion ***/5 ***/5   Inversion ***/5 ***/5      Special Tests: +/-    ***    ***    ***    ***      Outcome Measures:  {PT Outcome Measures:88582}     Treatments:  {PT Treatments:97755}    Ther-ex Time: ***    OP EDUCATION:       HEP to be completed daily; exercises include:  ***  Assessment:       Pt is a *** y.o. {Gender:[birth year]} who presents with signs and symptoms consistent with ***. The current impairments have led to functional limitations that include: ***. Pt would benefit from skilled physical therapy intervention to improve impairments and facilitate return to prior function.    Plan:       Goals:

## 2023-11-30 ENCOUNTER — TREATMENT (OUTPATIENT)
Dept: PHYSICAL THERAPY | Facility: CLINIC | Age: 53
End: 2023-11-30
Payer: COMMERCIAL

## 2023-11-30 ENCOUNTER — OFFICE VISIT (OUTPATIENT)
Dept: SPORTS MEDICINE | Facility: CLINIC | Age: 53
End: 2023-11-30
Payer: COMMERCIAL

## 2023-11-30 VITALS
BODY MASS INDEX: 39.4 KG/M2 | HEART RATE: 80 BPM | WEIGHT: 260 LBS | SYSTOLIC BLOOD PRESSURE: 136 MMHG | DIASTOLIC BLOOD PRESSURE: 84 MMHG | HEIGHT: 68 IN

## 2023-11-30 DIAGNOSIS — M62.9 HAMSTRING TIGHTNESS OF BOTH LOWER EXTREMITIES: ICD-10-CM

## 2023-11-30 DIAGNOSIS — M24.559 HIP FLEXOR TIGHTNESS, UNSPECIFIED LATERALITY: ICD-10-CM

## 2023-11-30 DIAGNOSIS — M71.22 SYNOVIAL CYST OF LEFT POPLITEAL SPACE: Primary | ICD-10-CM

## 2023-11-30 DIAGNOSIS — S89.92XD LEFT KNEE INJURY, SUBSEQUENT ENCOUNTER: ICD-10-CM

## 2023-11-30 DIAGNOSIS — M25.562 ACUTE PAIN OF LEFT KNEE: ICD-10-CM

## 2023-11-30 DIAGNOSIS — S83.412D SPRAIN OF MEDIAL COLLATERAL LIGAMENT OF LEFT KNEE, SUBSEQUENT ENCOUNTER: ICD-10-CM

## 2023-11-30 DIAGNOSIS — M21.70 LEG LENGTH DISCREPANCY: ICD-10-CM

## 2023-11-30 DIAGNOSIS — M22.8X9 PATELLAR MALTRACKING, UNSPECIFIED LATERALITY: ICD-10-CM

## 2023-11-30 DIAGNOSIS — M17.12 LOCALIZED OSTEOARTHRITIS OF LEFT KNEE: ICD-10-CM

## 2023-11-30 DIAGNOSIS — Q66.6 VALGUS DEFORMITY OF BOTH FEET: ICD-10-CM

## 2023-11-30 DIAGNOSIS — S83.8X2D INJURY OF MENISCUS OF KNEE, LEFT, SUBSEQUENT ENCOUNTER: ICD-10-CM

## 2023-11-30 DIAGNOSIS — S76.112D QUADRICEPS STRAIN, LEFT, SUBSEQUENT ENCOUNTER: ICD-10-CM

## 2023-11-30 DIAGNOSIS — S76.312D HAMSTRING STRAIN, LEFT, SUBSEQUENT ENCOUNTER: ICD-10-CM

## 2023-11-30 PROCEDURE — 1036F TOBACCO NON-USER: CPT | Performed by: FAMILY MEDICINE

## 2023-11-30 PROCEDURE — 97140 MANUAL THERAPY 1/> REGIONS: CPT | Mod: GP

## 2023-11-30 PROCEDURE — 3044F HG A1C LEVEL LT 7.0%: CPT | Performed by: FAMILY MEDICINE

## 2023-11-30 PROCEDURE — 3075F SYST BP GE 130 - 139MM HG: CPT | Performed by: FAMILY MEDICINE

## 2023-11-30 PROCEDURE — 99214 OFFICE O/P EST MOD 30 MIN: CPT | Performed by: FAMILY MEDICINE

## 2023-11-30 PROCEDURE — 20611 DRAIN/INJ JOINT/BURSA W/US: CPT | Performed by: FAMILY MEDICINE

## 2023-11-30 PROCEDURE — 2500000004 HC RX 250 GENERAL PHARMACY W/ HCPCS (ALT 636 FOR OP/ED): Mod: JZ | Performed by: FAMILY MEDICINE

## 2023-11-30 PROCEDURE — 97110 THERAPEUTIC EXERCISES: CPT | Mod: GP

## 2023-11-30 PROCEDURE — 3079F DIAST BP 80-89 MM HG: CPT | Performed by: FAMILY MEDICINE

## 2023-11-30 RX ORDER — DICLOFENAC SODIUM 10 MG/G
4 GEL TOPICAL 4 TIMES DAILY
COMMUNITY

## 2023-11-30 RX ADMIN — SODIUM HYALURONATE INTRA-ARTICULAR SOLN PREF SYR 25 MG/2.5ML 20 MG: 25/2.5 SOLUTION PREFILLED SYRINGE at 15:32

## 2023-11-30 ASSESSMENT — LIFESTYLE VARIABLES
HOW OFTEN DURING THE LAST YEAR HAVE YOU FAILED TO DO WHAT WAS NORMALLY EXPECTED FROM YOU BECAUSE OF DRINKING: NEVER
SKIP TO QUESTIONS 9-10: 1
HOW OFTEN DURING THE LAST YEAR HAVE YOU NEEDED AN ALCOHOLIC DRINK FIRST THING IN THE MORNING TO GET YOURSELF GOING AFTER A NIGHT OF HEAVY DRINKING: NEVER
HOW OFTEN DO YOU HAVE A DRINK CONTAINING ALCOHOL: 2-3 TIMES A WEEK
HAVE YOU OR SOMEONE ELSE BEEN INJURED AS A RESULT OF YOUR DRINKING: NO
HOW OFTEN DURING THE LAST YEAR HAVE YOU FOUND THAT YOU WERE NOT ABLE TO STOP DRINKING ONCE YOU HAD STARTED: NEVER
AUDIT-C TOTAL SCORE: 3
AUDIT TOTAL SCORE: 3
HAS A RELATIVE, FRIEND, DOCTOR, OR ANOTHER HEALTH PROFESSIONAL EXPRESSED CONCERN ABOUT YOUR DRINKING OR SUGGESTED YOU CUT DOWN: NO
HOW OFTEN DURING THE LAST YEAR HAVE YOU HAD A FEELING OF GUILT OR REMORSE AFTER DRINKING: NEVER
HOW OFTEN DO YOU HAVE SIX OR MORE DRINKS ON ONE OCCASION: NEVER
HOW MANY STANDARD DRINKS CONTAINING ALCOHOL DO YOU HAVE ON A TYPICAL DAY: 1 OR 2

## 2023-11-30 ASSESSMENT — ENCOUNTER SYMPTOMS
DEPRESSION: 0
OCCASIONAL FEELINGS OF UNSTEADINESS: 0
LOSS OF SENSATION IN FEET: 0

## 2023-11-30 ASSESSMENT — PAIN SCALES - GENERAL
PAINLEVEL_OUTOF10: 2
PAINLEVEL: 2
PAINLEVEL_OUTOF10: 2

## 2023-11-30 ASSESSMENT — PATIENT HEALTH QUESTIONNAIRE - PHQ9
2. FEELING DOWN, DEPRESSED OR HOPELESS: NOT AT ALL
SUM OF ALL RESPONSES TO PHQ9 QUESTIONS 1 & 2: 0
1. LITTLE INTEREST OR PLEASURE IN DOING THINGS: NOT AT ALL

## 2023-11-30 NOTE — PATIENT INSTRUCTIONS
Continue to alternate ice and moist heat as needed    Start into Physical Therapy 1-2 times a week for 8-10 weeks with manual therapy as well as dry needling and IASTM especially building up VMO strength   Stressed the importance of wearing shoes with good stability control to help with the biomechanics affecting the knees as well as the lower extremities    Stressed the importance of wearing full foot insoles to help with the biomechanics affecting the knees as well as the lower extremities, Recommendation over-the-counter calcium with vitamin-D 2 -3000+ milligrams a day, as well as OTC symphytum as directed daily to promote bony healing, in addition to a daily multivitamin.    Recommendation over-the-counter Move Free for joint health.    May take the following: Ibuprofen may alternate with OTC Tylenol Extra Strength or OTC Tylenol Arthritis, taking one every 6-8 hours with food as needed.    Patient advised regarding the risks and/or potential adverse reactions and/or side effects of any prescribed medications along with any over-the-counter medications or any supplements used. Patient advised to seek immediate medical care if any adverse reactions occur. The patient and/or patient(s) parent(s) verbalized their understanding,   Discussed in detail with the patient to the level of their understanding the possibility in the future of regenerative injections versus corticosteroid injections versus viscosupplementation injections versus a combination   Reviewed LEFT knee MRI in detail with the patient and/or patients parent/legal guardian to their level of understanding; a copy of these results were provided to the patient and/or patients parent/legal guardian at the time of this office visit. Discussed treatment options with the patient and/or patients parent/legal guardian in detail to the level of their understanding. The patient and/or patients parent/legal guardian verbalized their understanding and agreement to  the treatment plan at the time of this office visit.   Discussed with patient to possibility of surgical intervention in the future to repair meniscal injury.   Performed injection of Supartz #1 into patient's LEFT knee under ultrasound. The patient tolerated the procedure well and without any adverse effects. Discharge instructions for viscosupplementation injections were reviewed in detail with the patient to the level of their understanding; a copy of these instructions were provided to the patient at the time of this office visit.       Follow-up 1 week for Supartz #2 of the patients Left Knee or sooner as needed.

## 2023-11-30 NOTE — PROGRESS NOTES
"Physical Therapy Treatment    Patient Name: Narciso Pardo  MRN: [id number]  Today's Date: 11/30/2023  Time Calculation  Start Time: 1400  Stop Time: 1440  Time Calculation (min): 40 min    Current Problem  1. Acute pain of left knee  Follow Up In Physical Therapy      2. Sprain of medial collateral ligament of left knee, subsequent encounter  Follow Up In Physical Therapy      3. Left knee injury, subsequent encounter  Follow Up In Physical Therapy      4. Injury of meniscus of knee, left, subsequent encounter  Follow Up In Physical Therapy    Horizontal tear of the body and posterior horn segments of the  medial meniscus.      5. Hamstring strain, left, subsequent encounter  Follow Up In Physical Therapy      6. Quadriceps strain, left, subsequent encounter  Follow Up In Physical Therapy      7. Patellar maltracking, unspecified laterality  Follow Up In Physical Therapy      8. Hamstring tightness of both lower extremities  Follow Up In Physical Therapy      9. Hip flexor tightness, unspecified laterality  Follow Up In Physical Therapy          Subjective/General:  Reason for Referral: L knee pain  Referred By: Douglas Heart DO  General Comment: Visit #2 (20/yr with 13used previously)    The pt returns to the clinic stating that he is anticipating a knee injection at sports medicine after his PT session today.  Precautions  Precautions  Precautions Comment: none  Pain  Pain Score: 2  Pain Type: Chronic pain  Pain Location: Knee  Pain Orientation: Left  Objective  Grade I-II tenderness along medial tibiofemoral joint line     Treatments:  Therapeutic Exercise  Therapeutic Exercise Activity 1: Recumbent scifit stepper L2 x7mins  Therapeutic Exercise Activity 2: Seated LAQ with eccentric focus 2x10  Therapeutic Exercise Activity 3: Supine bridge with 3-5\" hold 2x10  Therapeutic Exercise Activity 4: QS + SLR in neutral and ER x10 reps ea bilat  Therapeutic Exercise Activity 5: HL clamshells with BTB x30  Therapeutic " "Exercise Activity 6: HL hip adduction with ball 3\" hold x30    Manual Therapy  Manual Therapy Activity 1: STM/MFR through the L quad, quad tendon, pes anserine tendons, patellar tendon, hamstring, ITB, gastrosoleus, tib anterior.  Manual Therapy Activity 2: Supine patellar mobilizations - Sup/inf and med/lat (grade III-IV)  Manual Therapy Activity 3: Supine tibiofemoral PA/AP mobilizations (grade II-III)    Ther-ex time: 20 mins  Manual time: 20 mins    OP Education:  Outpatient Education  Individual(s) Educated: Patient  Education Provided: Anatomy, Home Exercise Program, POC  Risk and Benefits Discussed with Patient/Caregiver/Other: yes  Patient/Caregiver Demonstrated Understanding: yes  Plan of Care Discussed and Agreed Upon: yes  Patient Response to Education: Patient/Caregiver Verbalized Understanding of Information, Patient/Caregiver Performed Return Demonstration of Exercises/Activities, Patient/Caregiver Asked Appropriate Questions  Education Comment: [employer] Access Code: [email or web address]    Assessment:  PT Assessment  PT Assessment Results: Decreased strength, Decreased range of motion, Pain  Rehab Prognosis: Good  Assessment Comment: The pt was able to complete all HEP without ill effects. Pain was decreased with manual interventions at end of session.    Pain end of session:  1/10    Plan:  OP PT Plan  Treatment/Interventions: Aquatic therapy, Cryotherapy, Dry needling, Education/ Instruction, Electrical stimulation, Gait training, Hot pack, Laser, Manual therapy, Neuromuscular re-education, Taping techniques, Therapeutic activities, Therapeutic exercises, Ultrasound  PT Plan: Skilled PT  PT Frequency:  (1-2x/wk)  Duration: 12wks  Onset Date: 11/28/23  Certification Period Start Date: 11/28/23  Certification Period End Date: 02/26/24  Number of Treatments Authorized: 10 then recheck - allowed 20/yr with 13 used on previous POC  Rehab Potential: Good  Plan of Care Agreement: Patient  Continue with current POC/no " "changes    Assessment of current progress against goals:  Insufficient treatment time to assess progress  Goals:  Active       PT Problem       PT STG       Start:  11/28/23    Expected End:  01/12/24       - Pt will complete the HEP with <3 verbal cues for correction  - Pt will demonstrate 2pt improvement on the NPRS, allowing for improved tolerance of functional activities.  - Pt will demonstrate ability to perform 5 step downs from 6\" step with 1 UE support in order to demonstrate improved ability to descend stairs.           PT LTG       Start:  11/28/23    Expected End:  02/26/24       - Pt will be independent in HEP & symptom management  - Pt will demonstrate a 4 pt improvement for knee pain on the NPRS, allowing for improved tolerance to perform daily functional activities.   - Pt will demonstrate at least 120deg knee flexion AROM without onset of pain, allowing for a normal pattern with performance of stairs.  - Pt will demonstrate ability to perform 10 sit to stands without UE use in order to demonstrate improved functional LE strength and improved independence with functional mobility  - Pt will demonstrate ability to perform 10 step downs from 8\" step with 1 UE support and without aggravation of symptoms in order to demonstrate improved ability to descend stairs.  - Pt will demonstrate 5/5 MMT grading throughout hip/knee musculature, allowing for appropriate muscle recruitment during daily activities.   - Pt will demonstrate normal mechanics without pain during gait and performance of stairs, allowing for pt to return to navigating the community.  - Pt will perform tandem stance >30\" with EO leading with ea LE in order to demonstrate improved proprioception and stability.  - Pt will demonstrate improved WOMAC score by 11 points (MCID) in order to demonstrate improved functional mobility.            Patient Stated Goal 1       Start:  11/28/23    Expected End:  02/26/24       \"Get rid of pain\"             "

## 2023-12-04 ENCOUNTER — TELEPHONE (OUTPATIENT)
Dept: SPORTS MEDICINE | Facility: CLINIC | Age: 53
End: 2023-12-04
Payer: COMMERCIAL

## 2023-12-04 NOTE — TELEPHONE ENCOUNTER
Patient left  asking if his disability paperwork had been completed. Advised that paperwork is completed and faxed to [employer] as requested. Copy will be left at the  for patient to  at his convenience.

## 2023-12-11 ENCOUNTER — TREATMENT (OUTPATIENT)
Dept: PHYSICAL THERAPY | Facility: CLINIC | Age: 53
End: 2023-12-11
Payer: COMMERCIAL

## 2023-12-11 DIAGNOSIS — M24.559 HIP FLEXOR TIGHTNESS, UNSPECIFIED LATERALITY: ICD-10-CM

## 2023-12-11 DIAGNOSIS — M22.8X9 PATELLAR MALTRACKING, UNSPECIFIED LATERALITY: ICD-10-CM

## 2023-12-11 DIAGNOSIS — M25.562 ACUTE PAIN OF LEFT KNEE: ICD-10-CM

## 2023-12-11 DIAGNOSIS — S83.8X2D INJURY OF MENISCUS OF KNEE, LEFT, SUBSEQUENT ENCOUNTER: ICD-10-CM

## 2023-12-11 DIAGNOSIS — S89.92XD LEFT KNEE INJURY, SUBSEQUENT ENCOUNTER: ICD-10-CM

## 2023-12-11 DIAGNOSIS — S76.312D HAMSTRING STRAIN, LEFT, SUBSEQUENT ENCOUNTER: ICD-10-CM

## 2023-12-11 DIAGNOSIS — S83.412D SPRAIN OF MEDIAL COLLATERAL LIGAMENT OF LEFT KNEE, SUBSEQUENT ENCOUNTER: ICD-10-CM

## 2023-12-11 DIAGNOSIS — M62.9 HAMSTRING TIGHTNESS OF BOTH LOWER EXTREMITIES: ICD-10-CM

## 2023-12-11 DIAGNOSIS — S76.112D QUADRICEPS STRAIN, LEFT, SUBSEQUENT ENCOUNTER: ICD-10-CM

## 2023-12-11 PROCEDURE — 97110 THERAPEUTIC EXERCISES: CPT | Mod: GP,CQ

## 2023-12-11 ASSESSMENT — PAIN SCALES - GENERAL: PAINLEVEL_OUTOF10: 0 - NO PAIN

## 2023-12-11 NOTE — PROGRESS NOTES
"Physical Therapy Treatment    Patient Name: Narciso Pardo  MRN: [id number]  Encounter date:  12/11/2023  Time Calculation  Start Time: 0945  Stop Time: 1030  Time Calculation (min): 45 min    Visit Number:  3 / 10    Current Problem  1. Acute pain of left knee  Follow Up In Physical Therapy      2. Sprain of medial collateral ligament of left knee, subsequent encounter  Follow Up In Physical Therapy      3. Left knee injury, subsequent encounter  Follow Up In Physical Therapy      4. Injury of meniscus of knee, left, subsequent encounter  Follow Up In Physical Therapy    Horizontal tear of the body and posterior horn segments of the  medial meniscus.      5. Hamstring strain, left, subsequent encounter  Follow Up In Physical Therapy      6. Quadriceps strain, left, subsequent encounter  Follow Up In Physical Therapy      7. Patellar maltracking, unspecified laterality  Follow Up In Physical Therapy      8. Hamstring tightness of both lower extremities  Follow Up In Physical Therapy      9. Hip flexor tightness, unspecified laterality  Follow Up In Physical Therapy          Precautions  Precautions  Precautions Comment: none    Pain  Pain Score: 0 - No pain    Subjective  General  General Comment: Visit #3 (20 per year with 13 used) (No new symptoms and no question about HEP)  Response to Previous Treatment: Compliant with home exercise program      Objective  Tight bilateral hamstrings     Treatment:    Nustep level 1, 5   Moflex calf stretch 20\"x 2 ea  Seated LAQ 2x10 ea   Standing hamsting curls 2x10 ea   Standing DL heel raises 2x10 ea   Standing toe raises 2x10 ea  Seated hamstring 20\"x 3 ea   Step up 2x10 ea fwd and lateral 4\"   3 way hip 2x10 ea   Tandem stance air ex 20\"x 2 - no UE     Billed Treatment Times:  Therapeutic Exercise 40 min       Assessment:   Initiated ROM and strengthening of bilateral LE in order to address functional limitations. No pain with intervention nor at exit.     Pt's response to " "treatment:  Good       Pain end of session:  \"same stiffness\"      Plan:     Continue with current POC/no changes    Assessment of current progress against goals:  Insufficient treatment time to assess progress    Goals:  Active       PT Problem       PT STG       Start:  11/28/23    Expected End:  01/12/24       - Pt will complete the HEP with <3 verbal cues for correction  - Pt will demonstrate 2pt improvement on the NPRS, allowing for improved tolerance of functional activities.  - Pt will demonstrate ability to perform 5 step downs from 6\" step with 1 UE support in order to demonstrate improved ability to descend stairs.           PT LTG       Start:  11/28/23    Expected End:  02/26/24       - Pt will be independent in HEP & symptom management  - Pt will demonstrate a 4 pt improvement for knee pain on the NPRS, allowing for improved tolerance to perform daily functional activities.   - Pt will demonstrate at least 120deg knee flexion AROM without onset of pain, allowing for a normal pattern with performance of stairs.  - Pt will demonstrate ability to perform 10 sit to stands without UE use in order to demonstrate improved functional LE strength and improved independence with functional mobility  - Pt will demonstrate ability to perform 10 step downs from 8\" step with 1 UE support and without aggravation of symptoms in order to demonstrate improved ability to descend stairs.  - Pt will demonstrate 5/5 MMT grading throughout hip/knee musculature, allowing for appropriate muscle recruitment during daily activities.   - Pt will demonstrate normal mechanics without pain during gait and performance of stairs, allowing for pt to return to navigating the community.  - Pt will perform tandem stance >30\" with EO leading with ea LE in order to demonstrate improved proprioception and stability.  - Pt will demonstrate improved WOMAC score by 11 points (MCID) in order to demonstrate improved functional mobility.            " "Patient Stated Goal 1       Start:  11/28/23    Expected End:  02/26/24       \"Get rid of pain\"                      "

## 2023-12-12 PROBLEM — M17.12 LOCALIZED OSTEOARTHRITIS OF LEFT KNEE: Status: ACTIVE | Noted: 2023-12-12

## 2023-12-12 PROBLEM — M71.22 SYNOVIAL CYST OF LEFT POPLITEAL SPACE: Status: ACTIVE | Noted: 2023-12-12

## 2023-12-12 NOTE — PROGRESS NOTES
Verbal consent of the patient and/or verbal parental consent for patients under the age of 18 have been obtained to conduct a physical examination at this office visit.    Established patient  History Of Present Illness  12/12/23 Narciso Pardo is a 53 y.o. male who presents for Supartz Injection 2/5 injection into the patients LEFT knee. He denies any pain currently, but also notes that he is not bowling or doing a lot of activity currently. He is in physical therapy and is also performing his home exercise program as previously directed. He denies any swelling, locking, catching, or instability in his LEFT knee at this time.     Last Recorded Vitals  There were no vitals taken for this visit.     All previous Progress Notes and imaging results related to this patients chief complaint have been reviewed in preparation for this examination.    Past Medical History  He has a past medical history of Fatty (change of) liver, not elsewhere classified, Other conditions influencing health status, Other obesity (05/11/2017), Other obesity (03/30/2018), Personal history of other diseases of the musculoskeletal system and connective tissue, Personal history of other diseases of the musculoskeletal system and connective tissue, Personal history of other mental and behavioral disorders, and Type 2 diabetes mellitus without complications (CMS/Formerly [facility]) (02/03/2017).    Surgical History  He has a past surgical history that includes Shoulder surgery (Left); Wrist surgery (Left); and Carpal tunnel release (Right).     Social History  He reports that he has never smoked. He has never used smokeless tobacco. He reports current alcohol use. He reports that he does not use drugs.    Family History  Family History   Problem Relation Name Age of Onset   • Diabetes Mother     • Diabetes Father          Allergies  Patient has no known allergies.    Historical Clinical Intake  May 10, 2023--- EAE --- Verbal consent of the patient and/or  verbal parental consent for patients under the age of 18 have been obtained to conduct a physical examination at this office visit. Newly established patient. Rolly is a 53yr old male, who works as a . He has been graciously referred to this office by Dr. Villalobos for LEFT knee pain that has been bothering him for a month. No known trauma or injury. 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 By Monday, he noticed pain that started in the back of his knee and then moved to the inside. Pain with flexion, extension and squatting. Pt notes that his pain has caused him to start walking with a limp. He purchased a compression knee sleeve as well as alternating between Aleve and Ibuprofen with no relief in pain prior to seeing his primary care provider. Upon suggestion, patient has recently purchased a hinged knee brace and has been taking Prednisone as prescribed by his primary care provider. He believes that both have currently helped provide some relief in his pain. Rates current pain as a 5/10 describing it as a constant ache, though prior to the new brace and Prednisone, his pain was a 7/10. Denies any numbness or tingling. No previous history of injury.  ---------------------------------------  June 29, 2023 Above note reviewed. Verbal consent of the patient and/or verbal parental consent for patients under the age of 18 have been obtained to conduct a physical examination at this office visit. Narciso returns today for his follow up LEFT knee. Serg presents today with increased pain to LEFT medial knee and additionally LEFT lumbar pain. His gait is ataxic, although he isn't using any assistance devices. He rates his LEFT knee pain 6/10 described as aching and his Left LUMBAR pain as 8/10 described as burning and stabbing. He has not started physical therapy yet, he was out of town and he plans on starting soon. He is wearing a copper knee  brace for additional support and he does take the Move free supplement. He adds that he saw Dr Villalobos for his Lumbar pain and he did some xrays and put him on Prednisone, which did not help. Serg denies and tingling or numbness. He takes ibuprofen for pain and he applies ice for some relief. We discussed treatment options. Patient continues to show indications of a knee sprain for which I recommended that he wear a hinged metal brace. Patient was using an over-the-counter hinged middle brace but states that it did not of the any durability and fell apart. As such we will fit patient for playmaker brace for increased ability under ability for his knee and he will start physical therapy as soon as possible. Patient also complaining of low back pain which may be exacerbated by his knee sprain after exam there indications of a low back strain we will amend his physical therapy order to include as lower back and can re-evaluate at his follow-up appointment. Patient verbalizes understanding and agreement with plan of care.  ---------------------------------------  July 5, 2023. HW  Verbal consent of the patient and/or verbal parental consent for patients under the age of 18 have been obtained to conduct a physical examination at this office visit. This 53 year old male presents with left sided low back pain. He presents today with a limp. He states he was here last Thursday for an appointment for his left knee. He mentioned at the appointment that his low back was bothering him. He states a couple weeks ago he went to [place] to [facility] and when he came home, his low back pain started. He was given a Toradol and Solumedrol injection and he was feeling better Friday morning when he woke up. He states Friday morning he fell down 6 steps on his buttocks, mostly on his left side. He states he also hit his left knee when he fell, but he is no longer having pain in his knee. He states he has pain all the time, with no relief. He has  been taking Cyclobenzaprine and Diclofenac with no relief. He hasn't been able to sleep well due to his pain. He states he has more pain with back extension than back flexion. He denies any radiculitis. He was unable to go to work this weekend. 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 He iced, heated, took medications and tried stretching, but he is still in pain. He rates his pain at 8/10 and describes it as sharp. He is supposed to work the next 3 nights, but doesn't think is able to.  ---------------------------------------  August 2, 2023 Above notes reviewed. Verbal consent of the patient and/or verbal parental consent for patients under the age of 18 have been obtained to conduct a physical examination at this office visit. Narciso returns today for his follow up of his LOW BACK. He states that he is feeling slightly improved since his previous visit. Rates current pain as a 3.5/10 describing it as a stiffness. Pt has remained out of work since last seen in office. He has completed seven sessions of physical therapy since last visit and feels that it has been helping with his pain. We discussed treatment options. Patient continues to have point tenderness over the lower lumbar spine as well as significant restriction especially with extension lateral flexion and rotation. Patient continues to complain of pain although he states that is improved. Patient has only completed 3 weeks of physical therapy and as such we will continue with current treatment options for another 3-4 weeks and re-evaluate at that time. If patient continues to have pain tenderness and restriction we will consider an MRI at that time. Patient verbalizes understanding and agreement with plan of care.  ---------------------------------------  August 30, 2023 Above notes reviewed. Verbal consent of the patient and/or verbal parental consent for patients under the age of 18 have been obtained to conduct a physical  "examination at this office visit. Narciso returns today for his follow up of his low back. Pt states that he is feeling better since his previous visit. He feels that his back is back to normal but his knee pain comes and goes. Rates current back pain as a 2/10 describing it as stiff and his knee pain as a 3/10 describing it as a \"nagging\" pain. He continues with physical therapy with about six sessions left, noting that he feels it is helping his back more than his knee. Pt feels that he can return back to work and will bring in paperwork to be completed to document such. We discussed treatment options. Patient has been doing well with his physical therapy and states that he is feeling largely back to normal. Upon exam patient does continue to have point tenderness over the lumbar spine specifically the L3 vertebrae and associated paraspinal muscles. As such we will order an MRI to better evaluate for any underlying pathology. Patient can return to work without restrictions and we can reevaluate after his MRI or as symptoms change. Patient verbalizes understanding and agreement with plan of care.  ---------------------------------------  September 26, 2023 Above notes reviewed. Verbal consent of the patient and/or verbal parental consent for patients under the age of 18 have been obtained to conduct a physical examination at this office visit. Previously established patient. Narciso returns today for his MRI follow up of his LUMBAR spine. States that his back is feeling somewhat better rating pain as a 2/10 describing it as stiffness. His knee however is worse, rating it as a 6/10. Pt has started and is continuing with physical therapy for his knee. He has been wearing the brace and notes that it is supportive but not making it feel better. We reviewed patient MRI results in detail. Patient verbalizes understanding of results. We discussed treatment options and will refer patient to Dr. Sosa for surgical consult " and evaluation as recommended by radiology. Patient can follow-up after surgical consult for reevaluation of the knee and we can discuss treatment options at that time such as physical therapy or more advanced imaging. Patient verbalizes understanding and agreement with plan of care.  ---------------------------------------  11/2/2023---Narciso Pardo is a 53 y.o. male presenting with re-injury of his LEFT knee. Unknown trauma or injury. He works at [employer] and is on his feet for twelve hours a day. Pain has worsened over the past two weeks to the point that it is making it difficult to walk. Pain also is disrupting sleep at night. He states that his pain primarily in the medial aspect of his knee. Pain with all range of motion. He does continue to wear the brace he was previously fitted for in this office and notes that while it does support his knee, it does nothing for his pain. Rates current pain as a 8/10 describing it as a constant throbbing pain with sitting and a shocking pain with movement. Pt continues to Move Free and NSAIDs for joint pain over the past month with no improvement in pain. Pt did physical therapy last time he was seen and notes that it did not make any improvements with his pain in his knee which was last done in early September.  ---------------------------------------   11/13/23 Narciso Pardo is a 53 y.o. male who presents to review his LEFT knee MRI. He completed Physical Therapy in September 2023. He continues to perform his home exercise program previously provided to him by Physical Therapy. He describes his pain as sharp and burning along the medial aspect of his left knee. He notes pain exacerbates when transitioning from sitting to standing, with ambulation and throughout the evening. He states stretching and stairs in both directions also increase pain. He cites pain disturbs his sleep. He walks with a myopathic gait. Narciso works 12 hour swing shifts at [employer] on a [employer] and states he has been unable to work a full shift since returning to work in September. He tells that he works on concrete floor and consistently goes up and down on ladders. He rates his pain at 7/10 today, but states pain reaches 10/10 daily. He states that his pain is constant. He continues to take previously prescribed Prednisone and takes OTC Ibuprofen, applies OTC Voltaren gel and ice with no relief. He has also worn previously supplied Playmaker knee brace but says his pain increased with use so he stopped wearing it. 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  He would like a cortisone shot if possible.   ---------------------------------------  11/28/23 Narciso Pardo is a 53 y.o. male who presents for Supartz Injection 1/5 injection into the patients LEFT knee. He states that he is feeling better s/p cortisone injection and pain has improved significantly. He continues to c/o dull achy pain along the medial joint line of his LEFT knee. He has started into physical therapy and is performing his home exercise program as previously instructed. He denies any instability, locking, catching, numbness/tingling, or swelling at this time.   He is taking all supplements as previously directed and is wearing shoes with good support as well as OTC insoles as previously recommended.  He rates pain at 2/10 today in his LEFT knee.     Review of Systems:  CONSTITUTIONAL:   Negative for weight change, loss of appetite, fatigue, weakness, fever, chills, night sweats, headaches .           HEENT:   Negative for cold, cough, sore throat, sinus pain, swollen lymph nodes.           OPHTHALMOLOGY:   Negative for diminished vision, blurred vision, loss of vision, double vision.           ALLERGY:   Negative for runny nose, scratchy throat, sinus congestion, rash, facial pressure, nasal congestion, post-nasal drip.            CARDIOLOGY:   Negative for chest pain, palpitations, murmurs, irregular heart beat, shortness of breath, leg edema, dyspnea on exertion, fatigue, dizziness.           RESPIRATORY:   Negative for chest pain, shortness of breath, swelling of the legs, asthma/copd, chest congestion, pain with breathing .           GASTROENTEROLOGY:   Negative for nausea, vomitting, heartburn, constipation, diarrhea, blood in stool, change in bowel habits, black stool.           HEMATOLOGY/LYMPH:   Negative for fatigue, loss of appetitie, easy bruising, easy bleeding, anemia, abnormal bleeding, slow healing.           ENDOCRINOLOGY:   Negative for polyuria, polydipsia, polyphagia, fatigue, weight loss, weight gain, cold intolerance, heat intolerance, diabetes.           MUSCULOSKELETAL:   Positive  for LEFT Knee pain        DERMATOLOGY:   Negative for rash, bruising.           NEUROLOGY:   Negative for tingling, numbness, gait abnormality, paresthesias, weakness, sciatica.         General Examination:  GENERAL APPEARANCE: Appears well, pleasant, and cooperative, NAD.   HEENT: Normal, unremarkable.   NECK: Supple.   HEART: RRR, normal S1S2.   LUNGS: Clear to auscultation bilaterally.   ABDOMEN: Soft, NT/ND, BS present.   EXTREMITIES: No cyanosis, clubbing.   SKIN: No rash or cellulitis.   NEUROLOGIC EXAM: Awake, A&O x 3, CN's II-XII grossly intact.   PSYCH: Good eye contact, appropriate mood and affect      Examination:  Left  Knee  Edema Negative  Effusion: Negative.   Percussion Test:  Positive    Tuning Fork Test:  Positive    Ecchymosis/Bruising: Negative.            Palpation:   Positive Tender to palpation body and posterior joint line medial meniscus LEFT       Orientation:  Asymmetrical: because of the swelling.            Range of Motion:   Positive Knee Flexion ( 0-135 degrees) Decreased because of pain and swelling  Positive Knee Extension ( 0-15 degrees) Decreased because of pain and swelling  about 160 degrees      Muscle Strength:    Positive +4/+5 Quadricep Extension Causes pain  Positive +4/+5  Hamstring Flexion Causes pain          +5+5 Gastrocnemius  +5+5 Soleus.            Proprioception:        Pain with Squat: Positive    Pain with Sumo Squat:  Positive    Hop Test: Positive    Single leg balance test Positive            Vascular:   +2/+4 Femoral  +2/+4 Dorsalis Pedis  +2/+4 Posterior Tibial  Capillary Refill less than 2 seconds.     Diagnostic studies:  MRI of the left knee without contrast      INDICATION:  Signs/Symptoms:LEFT KNEE PAIN      COMPARISON:  None.      ACCESSION NUMBER(S):  [id number]      ORDERING CLINICIAN:  JODIE TORRE      TECHNIQUE:  Multiplanar multisequence MRI of the left knee was performed without  intravenous contrast.      FINDINGS:  Menisci:  Lateral meniscus is intact. Horizontal tear of the body and  posterior horn segments of the medial meniscus with fluid signal  reaching the inferior surface..      Cruciate ligaments: Intact.      Collateral ligaments: Intact.      Tendons:  Intact.      Muscles: Intact.      Bones:  No evidence of acute fracture. Bone marrow signal intensity  is within normal limits. Sequela of chronic Osgood-Schlatter with  corticated prominence of the tibial tubercle.      Articular cartilage:  Lateral compartment: Intact.  Medial compartment: Mild thinning of the posterior weight-bearing  medial femoral condyle involving a small area of proximally about 14  mm AP. Medial tibial plateau cartilage is intact. Patellofemoral  compartment: Focal near full-thickness chondral loss of superior  aspect of the medial trochlear groove with surrounding areas of  delamination.      Miscellaneous: A small knee joint effusion is noted. A small Baker's  cyst is noted..      IMPRESSION:  1. Horizontal tear of the body and posterior horn segments of the  medial meniscus.  2. Mild medial and patellofemoral compartment degenerative changes  with chondral loss as described  above.  3. Small Bakers cyst   4. Small joint effusion  5. Chronic Osgood Schlatter disease        ------------------------------------------------------------------------     PROCEDURE:  KNEE LT MIN 4 VIEW - TXR  [id number]  REASON FOR EXAM: ACUTE PAIN OF LEFT KNEE  RESULT: MRN: [id number]     Patient Name: FARZANA RUSH   STUDY: KNEE LT MIN 4 VIEW 5/10/2023 8:35 am  INDICATION: ACUTE PAIN OF LEFT KNEE 53-year-old man with left knee pain for 1 month, medially. No known injury  COMPARISON: Left tibial radiograph 02/03/2017     ACCESSION NUMBER(S): [id number]   ORDERING CLINICIAN: JODIE TORRE     TECHNIQUE: Four views of the left knee were performed.     FINDINGS:  No cortical irregularity or lucency is identified to suggest acute fracture or dislocation of the left knee. There is mild tricompartmental joint space narrowing with small osteophyte formation. No significant suprapatellar knee joint effusion. Prominent enthesophyte at the tibial tubercle. Small enthesophytes superiorly and inferiorly at the patella.     IMPRESSION:  Degenerative changes of the left knee, as detailed above. No sign of acute fracture or significant suprapatellar knee joint effusion. If symptoms persist, consider MRI for further evaluation.     Dictation workstation:   [id number]   Original Interpreting Physician:   FRANCESCA HOWELL MD  Original Transcribed by/Date: MMODAL May 10 2023  8:13A        Knee - ACL:  Anterior Drawer Test: Positive    Lachman Test: Positive       Lelli Test:  Positive       KNEE - MCL / LCL:  Valgus Stress Test:  90 degrees: Negative, 20-30 degrees: Negative.   Varus Stress Test:  90 degrees: Positive  20-30 degrees: Positive   Apley Distraction Test: Positive Lateral.   Thessaly Test: Positive Anteior Lateral Meniscus, Posterior Lateral Meniscus, Origins and Insertion LCL, Distal Hamstring.         KNEE - IT BAND:  Cayla Test: Negative.   Noble Test: Negative.          KNEE - MENISCUS:  Apley Compression Test:  Positive  Lateral joint line.   Stienmann Test:  Positive    Terrance Test: Positive  Lateral joint line.   Bounce Home Test:  Positive   Thessaly Test:  Positive Anterior Lateral Joint Line Pain and Posterior Lateral Joint Line Pain.            KNEE - PATELLA:  Apprehension Test:  Positive   Glide Test:  Positive   Grind Test: Positive   Patella Tracking Test: Positive               KNEE - QUADRICEPS:    VMO Test: Positive    VLO Test:  Negative.              Feet/Foot: Positive  BILATERAL  Valgus foot     Assessment   1. Localized osteoarthritis of left knee        2. Injury of meniscus of knee, left, subsequent encounter        3. Synovial cyst of left popliteal space        4. Acute pain of left knee        5. Sprain of medial collateral ligament of left knee, subsequent encounter        6. Left knee injury, subsequent encounter        7. Patellar maltracking, unspecified laterality        8. Hamstring tightness of both lower extremities        9. Hip flexor tightness, unspecified laterality        10. Leg length discrepancy        11. Valgus deformity of both feet        12. Instability of left knee joint        13. Hamstring strain, left, subsequent encounter        14. Quadriceps strain, left, subsequent encounter            Procedure   Time Out (5 Minutes): Yes  Patient Name: Narciso Pardo  YOB: 1970  Procedure: Supartz Injection 2/5  Needed Supplies Available: Correct Supplies  Laterality: Left Knee  Site Verified and Marked: Correct Site(s) Marked  Timeout Performed by Provider: Dr. Douglas Heart, D.O.  Staff Initials: YAEL    L Inj/Asp: L knee on 12/13/2023 9:45 AM  Indications: pain  Details: 22 G needle, ultrasound-guided medial approach  Medications: 2.5 mL sodium hyaluronate 10 mg/mL  Outcome: tolerated well, no immediate complications  Procedure, treatment alternatives, risks and benefits explained, specific risks discussed. Consent was given by the patient. Immediately prior to  procedure a time out was called to verify the correct patient, procedure, equipment, support staff and site/side marked as required.          Patient is informed and consent has been signed by the patient if over 18 years old., Patient parent and/or legal guardian is informed and consent has been signed., Patient and/or parent and/or legal guardian accept all risks, benefits, and possible complications associated with procedure(s) and/or manipulation(s) and/or injection(s)., Patient and/or parent and/or legal guardian deny patient allergy or known complications with any of the medications, instruments, products, and/or techniques used., All questions and concerns were answered and/or addressed with the patient and/or parent and/or legal guardian., The patient and/or parent and/or legal guardian agree to proceed with the procedure(s) and/or manipulation(s) and/or injection(s)., Prep: The area was cleansed with a mixture of equal parts rubbing alcohol 70% and Hibclens., Utilizing clean technique, the injection site(s) were marked with a skin marker., and Injection site(s) were anesthestized with topical analgesic spray prior to injection.    Performed regenerative Supartz Injection 2/5 into the patients Left Knee, under ultrasound.  Supartz FX (25mg/2.5ml)     Band-aid and/or compressive bandage was placed over the injection site(s). After the injection(s) performed osteopathic manipulation therapy to the affected area(s) to help increase blood flow to aid with the healing process as well as circulation of the medication. The patient tolerated the procedure well without any complications. The patient was instructed to gently massage the treatment site(s) as well as to apply moist heat to the affected area(s). Additionally, the patient was instructed to contact the office immediately with any complications or concerns.    The , Jasmyne, was  present in the room during the entire procedure.    The  following discharge instructions were reviewed in detail with the patient to the level of their understanding:    PAIN:  A mild to moderate amount of discomfort, tenderness, stiffness, and achiness-caused by the inflammation of the area can be expected for a few days after the injection. This is normal and good as the inflammation is an important part of the healing process.    SKIN: Due to the numbing spray used, you may develop a red, itchy, scaly rash in the area that was sprayed. Should your skin become itchy, wash the area with mild soap and warm water. If needed, you may apply topical over-the-counter Hydrocortisone cream to alleviate any itchiness. AVOID scratching due to risk of infection.,     BATHING/SWIMMING: You may shower after your procedure with regular soap and water, but no baths, no swimming, and no hot tubs for 3-4 days after the procedure.,     ACTIVITIES:  Immediately following the injection, you may resume daily activities (such as work) and light exercise. We suggest that you refrain from strenuous activity and heavy lifting, particularly the injured body part, for a week post-procedure, but sometimes this can change as well.    MOIST HEAT/ICE:  Moist heat may be used on the injection area. NO ICE.  MEDICATION: You may continue your prescribed medications and any over-the-counter supplements; however, it is not recommended to use aspirin or anti-inflammatories (Motrin, Advil, Aleve, Ibuprofen, Naproxen etc.) for up to 2 weeks post procedure. Tylenol Extra Strength, Tylenol Arthritis and/or any prescribed narcotics or muscle relaxants are OK to take.    APPOINTMENTS: You should have a follow-up appointment with Dr. Heart scheduled 1-3 weeks as recommended after your procedure for your next round of injections or to follow-up after you have completed your injection series. Please schedule your follow-up appointment on your way out after your procedure, or call the office to schedule these  appointments as soon as possible.    PRECAUTIONS: Smoking, caffeine, alcohol, sex and anti-inflammatories post-procedure may reduce the effectiveness of Prolotherapy/Neural Prolotherapy/Prolozone injections. Early, rare post procedure problems that can be concerning are as follows: an increase in pain not relieved by medication (over the counter or prescription), a temperature above 101 degrees, bleeding or progressive, extreme swelling, or numbness.     CALL THE OFFICE OR GO TO THE EMERGENCY ROOM IF ANY OF THESE SYMPTOMS OCCUR.   If you have any questions or problems, please call our office at [phone]     Treatment or Intervention:  Continue to alternate ice and moist heat as needed    Continue  Physical Therapy 1-2 times a week for 8-10 weeks with manual therapy as well as dry needling and IASTM especially building up VMO strength   Once again, stressed the importance of wearing shoes with good stability control to help with the biomechanics affecting the knees as well as the lower extremities    Once again, stressed the importance of wearing full foot insoles to help with the biomechanics affecting the knees as well as the lower extremities, Recommendation over-the-counter calcium with vitamin-D 2 -3000+ milligrams a day, as well as OTC symphytum as directed daily to promote bony healing, in addition to a daily multivitamin.    Once again, recommendation over-the-counter Move Free for joint health.    Once again, may take the following: Ibuprofen may alternate with OTC Tylenol Extra Strength or OTC Tylenol Arthritis, taking one every 6-8 hours with food as needed.    Patient advised regarding the risks and/or potential adverse reactions and/or side effects of any prescribed medications along with any over-the-counter medications or any supplements used. Patient advised to seek immediate medical care if any adverse reactions occur. The patient and/or patient(s) parent(s) verbalized their understanding,   Once  again, discussed with patient to possibility of surgical intervention in the future to repair meniscal injury.   Performed injection of Supartz #2 into patient's LEFT knee under ultrasound. The patient tolerated the procedure well and without any adverse effects. Discharge instructions for regenerative/viscosupplementation/corticosteroid injections were reviewed in detail with the patient to the level of their understanding; a copy of these instructions were provided to the patient at the time of this office visit.   Return 1-2 weeks for Supartz #3 into patient's LEFT knee or sooner as needed.    Diagnostic studies:  No additional imaging or laboratory studies are needed at this time.    Activity Instructions, Restrictions, and Accommodations:  The family has been provided a note (after visit summary) outlining all current activity instructions, restrictions, and accommodations.    Consultations/Referrals:  None at this time.    Follow-up 1-2 weeks for Supartz #3 of the patients LEFT Knee or sooner as needed.     BING MAURO on 12/12/23 at 4:12 PM.     Douglas Heart DO, FAOASM

## 2023-12-13 ENCOUNTER — OFFICE VISIT (OUTPATIENT)
Dept: SPORTS MEDICINE | Facility: CLINIC | Age: 53
End: 2023-12-13
Payer: COMMERCIAL

## 2023-12-13 VITALS
WEIGHT: 260 LBS | HEIGHT: 68 IN | HEART RATE: 86 BPM | BODY MASS INDEX: 39.4 KG/M2 | DIASTOLIC BLOOD PRESSURE: 82 MMHG | SYSTOLIC BLOOD PRESSURE: 134 MMHG

## 2023-12-13 DIAGNOSIS — M25.562 ACUTE PAIN OF LEFT KNEE: ICD-10-CM

## 2023-12-13 DIAGNOSIS — S83.8X2D INJURY OF MENISCUS OF KNEE, LEFT, SUBSEQUENT ENCOUNTER: ICD-10-CM

## 2023-12-13 DIAGNOSIS — M17.12 LOCALIZED OSTEOARTHRITIS OF LEFT KNEE: ICD-10-CM

## 2023-12-13 DIAGNOSIS — S83.412D SPRAIN OF MEDIAL COLLATERAL LIGAMENT OF LEFT KNEE, SUBSEQUENT ENCOUNTER: ICD-10-CM

## 2023-12-13 DIAGNOSIS — S76.112D QUADRICEPS STRAIN, LEFT, SUBSEQUENT ENCOUNTER: ICD-10-CM

## 2023-12-13 DIAGNOSIS — M22.8X9 PATELLAR MALTRACKING, UNSPECIFIED LATERALITY: ICD-10-CM

## 2023-12-13 DIAGNOSIS — S89.92XD LEFT KNEE INJURY, SUBSEQUENT ENCOUNTER: ICD-10-CM

## 2023-12-13 DIAGNOSIS — M62.9 HAMSTRING TIGHTNESS OF BOTH LOWER EXTREMITIES: ICD-10-CM

## 2023-12-13 DIAGNOSIS — M71.22 SYNOVIAL CYST OF LEFT POPLITEAL SPACE: ICD-10-CM

## 2023-12-13 DIAGNOSIS — M21.70 LEG LENGTH DISCREPANCY: ICD-10-CM

## 2023-12-13 DIAGNOSIS — Q66.6 VALGUS DEFORMITY OF BOTH FEET: ICD-10-CM

## 2023-12-13 DIAGNOSIS — M25.362 INSTABILITY OF LEFT KNEE JOINT: ICD-10-CM

## 2023-12-13 DIAGNOSIS — M24.559 HIP FLEXOR TIGHTNESS, UNSPECIFIED LATERALITY: ICD-10-CM

## 2023-12-13 DIAGNOSIS — S76.312D HAMSTRING STRAIN, LEFT, SUBSEQUENT ENCOUNTER: ICD-10-CM

## 2023-12-13 PROCEDURE — 1036F TOBACCO NON-USER: CPT | Performed by: FAMILY MEDICINE

## 2023-12-13 PROCEDURE — 99214 OFFICE O/P EST MOD 30 MIN: CPT | Performed by: FAMILY MEDICINE

## 2023-12-13 PROCEDURE — 3079F DIAST BP 80-89 MM HG: CPT | Performed by: FAMILY MEDICINE

## 2023-12-13 PROCEDURE — 3044F HG A1C LEVEL LT 7.0%: CPT | Performed by: FAMILY MEDICINE

## 2023-12-13 PROCEDURE — 20611 DRAIN/INJ JOINT/BURSA W/US: CPT | Performed by: FAMILY MEDICINE

## 2023-12-13 PROCEDURE — 2500000004 HC RX 250 GENERAL PHARMACY W/ HCPCS (ALT 636 FOR OP/ED): Mod: JZ | Performed by: FAMILY MEDICINE

## 2023-12-13 PROCEDURE — 3075F SYST BP GE 130 - 139MM HG: CPT | Performed by: FAMILY MEDICINE

## 2023-12-13 RX ADMIN — SODIUM HYALURONATE INTRA-ARTICULAR SOLN PREF SYR 25 MG/2.5ML 2.5 ML: 25/2.5 SOLUTION PREFILLED SYRINGE at 09:45

## 2023-12-13 ASSESSMENT — LIFESTYLE VARIABLES
AUDIT TOTAL SCORE: 2
HOW OFTEN DURING THE LAST YEAR HAVE YOU FAILED TO DO WHAT WAS NORMALLY EXPECTED FROM YOU BECAUSE OF DRINKING: NEVER
HOW OFTEN DO YOU HAVE A DRINK CONTAINING ALCOHOL: 2-4 TIMES A MONTH
HAVE YOU OR SOMEONE ELSE BEEN INJURED AS A RESULT OF YOUR DRINKING: NO
HOW OFTEN DURING THE LAST YEAR HAVE YOU BEEN UNABLE TO REMEMBER WHAT HAPPENED THE NIGHT BEFORE BECAUSE YOU HAD BEEN DRINKING: NEVER
HOW OFTEN DURING THE LAST YEAR HAVE YOU HAD A FEELING OF GUILT OR REMORSE AFTER DRINKING: NEVER
AUDIT-C TOTAL SCORE: 2
HOW MANY STANDARD DRINKS CONTAINING ALCOHOL DO YOU HAVE ON A TYPICAL DAY: 1 OR 2
HOW OFTEN DO YOU HAVE SIX OR MORE DRINKS ON ONE OCCASION: NEVER
SKIP TO QUESTIONS 9-10: 1
HOW OFTEN DURING THE LAST YEAR HAVE YOU NEEDED AN ALCOHOLIC DRINK FIRST THING IN THE MORNING TO GET YOURSELF GOING AFTER A NIGHT OF HEAVY DRINKING: NEVER
HOW OFTEN DURING THE LAST YEAR HAVE YOU FOUND THAT YOU WERE NOT ABLE TO STOP DRINKING ONCE YOU HAD STARTED: NEVER
HAS A RELATIVE, FRIEND, DOCTOR, OR ANOTHER HEALTH PROFESSIONAL EXPRESSED CONCERN ABOUT YOUR DRINKING OR SUGGESTED YOU CUT DOWN: NO

## 2023-12-13 ASSESSMENT — COLUMBIA-SUICIDE SEVERITY RATING SCALE - C-SSRS
2. HAVE YOU ACTUALLY HAD ANY THOUGHTS OF KILLING YOURSELF?: NO
6. HAVE YOU EVER DONE ANYTHING, STARTED TO DO ANYTHING, OR PREPARED TO DO ANYTHING TO END YOUR LIFE?: NO
1. IN THE PAST MONTH, HAVE YOU WISHED YOU WERE DEAD OR WISHED YOU COULD GO TO SLEEP AND NOT WAKE UP?: NO

## 2023-12-13 ASSESSMENT — PATIENT HEALTH QUESTIONNAIRE - PHQ9
1. LITTLE INTEREST OR PLEASURE IN DOING THINGS: NOT AT ALL
2. FEELING DOWN, DEPRESSED OR HOPELESS: NOT AT ALL
SUM OF ALL RESPONSES TO PHQ9 QUESTIONS 1 & 2: 0

## 2023-12-13 ASSESSMENT — ENCOUNTER SYMPTOMS
OCCASIONAL FEELINGS OF UNSTEADINESS: 0
LOSS OF SENSATION IN FEET: 0
DEPRESSION: 0

## 2023-12-13 ASSESSMENT — PAIN - FUNCTIONAL ASSESSMENT: PAIN_FUNCTIONAL_ASSESSMENT: NO/DENIES PAIN

## 2023-12-13 ASSESSMENT — PAIN SCALES - GENERAL: PAINLEVEL: 0-NO PAIN

## 2023-12-13 NOTE — PATIENT INSTRUCTIONS
The following discharge instructions were reviewed in detail with the patient to the level of their understanding:    PAIN:  A mild to moderate amount of discomfort, tenderness, stiffness, and achiness-caused by the inflammation of the area can be expected for a few days after the injection. This is normal and good as the inflammation is an important part of the healing process.    SKIN: Due to the numbing spray used, you may develop a red, itchy, scaly rash in the area that was sprayed. Should your skin become itchy, wash the area with mild soap and warm water. If needed, you may apply topical over-the-counter Hydrocortisone cream to alleviate any itchiness. AVOID scratching due to risk of infection.,     BATHING/SWIMMING: You may shower after your procedure with regular soap and water, but no baths, no swimming, and no hot tubs for 3-4 days after the procedure.,     ACTIVITIES:  Immediately following the injection, you may resume daily activities (such as work) and light exercise. We suggest that you refrain from strenuous activity and heavy lifting, particularly the injured body part, for a week post-procedure, but sometimes this can change as well.    MOIST HEAT/ICE:  Moist heat may be used on the injection area. NO ICE.  MEDICATION: You may continue your prescribed medications and any over-the-counter supplements; however, it is not recommended to use aspirin or anti-inflammatories (Motrin, Advil, Aleve, Ibuprofen, Naproxen etc.) for up to 2 weeks post procedure. Tylenol Extra Strength, Tylenol Arthritis and/or any prescribed narcotics or muscle relaxants are OK to take.    APPOINTMENTS: You should have a follow-up appointment with Dr. Heart scheduled 1-3 weeks as recommended after your procedure for your next round of injections or to follow-up after you have completed your injection series. Please schedule your follow-up appointment on your way out after your procedure, or call the office to schedule these  appointments as soon as possible.    PRECAUTIONS: Smoking, caffeine, alcohol, sex and anti-inflammatories post-procedure may reduce the effectiveness of Prolotherapy/Neural Prolotherapy/Prolozone injections. Early, rare post procedure problems that can be concerning are as follows: an increase in pain not relieved by medication (over the counter or prescription), a temperature above 101 degrees, bleeding or progressive, extreme swelling, or numbness.     CALL THE OFFICE OR GO TO THE EMERGENCY ROOM IF ANY OF THESE SYMPTOMS OCCUR.   If you have any questions or problems, please call our office at [phone]     Treatment or Intervention:  Continue to alternate ice and moist heat as needed    Continue  Physical Therapy 1-2 times a week for 8-10 weeks with manual therapy as well as dry needling and IASTM especially building up VMO strength   Once again, stressed the importance of wearing shoes with good stability control to help with the biomechanics affecting the knees as well as the lower extremities    Once again, stressed the importance of wearing full foot insoles to help with the biomechanics affecting the knees as well as the lower extremities, Recommendation over-the-counter calcium with vitamin-D 2 -3000+ milligrams a day, as well as OTC symphytum as directed daily to promote bony healing, in addition to a daily multivitamin.    Once again, recommendation over-the-counter Move Free for joint health.    Once again, may take the following: Ibuprofen may alternate with OTC Tylenol Extra Strength or OTC Tylenol Arthritis, taking one every 6-8 hours with food as needed.    Patient advised regarding the risks and/or potential adverse reactions and/or side effects of any prescribed medications along with any over-the-counter medications or any supplements used. Patient advised to seek immediate medical care if any adverse reactions occur. The patient and/or patient(s) parent(s) verbalized their understanding,   Once  again, discussed with patient to possibility of surgical intervention in the future to repair meniscal injury.   Performed injection of Supartz #2 into patient's LEFT knee under ultrasound. The patient tolerated the procedure well and without any adverse effects. Discharge instructions for regenerative/viscosupplementation/corticosteroid injections were reviewed in detail with the patient to the level of their understanding; a copy of these instructions were provided to the patient at the time of this office visit.   Return 1-2 weeks for Supartz #3 into patient's LEFT knee or sooner as needed.    Diagnostic studies:  No additional imaging or laboratory studies are needed at this time.    Activity Instructions, Restrictions, and Accommodations:  The family has been provided a note (after visit summary) outlining all current activity instructions, restrictions, and accommodations.    Consultations/Referrals:  None at this time.    Follow-up 1-2 weeks for Supartz #3 of the patients LEFT Knee or sooner as needed.

## 2023-12-15 ENCOUNTER — TREATMENT (OUTPATIENT)
Dept: PHYSICAL THERAPY | Facility: CLINIC | Age: 53
End: 2023-12-15
Payer: COMMERCIAL

## 2023-12-15 DIAGNOSIS — S76.312D HAMSTRING STRAIN, LEFT, SUBSEQUENT ENCOUNTER: ICD-10-CM

## 2023-12-15 DIAGNOSIS — S89.92XD LEFT KNEE INJURY, SUBSEQUENT ENCOUNTER: ICD-10-CM

## 2023-12-15 DIAGNOSIS — S83.412D SPRAIN OF MEDIAL COLLATERAL LIGAMENT OF LEFT KNEE, SUBSEQUENT ENCOUNTER: ICD-10-CM

## 2023-12-15 DIAGNOSIS — M25.562 ACUTE PAIN OF LEFT KNEE: ICD-10-CM

## 2023-12-15 DIAGNOSIS — M22.8X9 PATELLAR MALTRACKING, UNSPECIFIED LATERALITY: ICD-10-CM

## 2023-12-15 DIAGNOSIS — M62.9 HAMSTRING TIGHTNESS OF BOTH LOWER EXTREMITIES: ICD-10-CM

## 2023-12-15 DIAGNOSIS — M24.559 HIP FLEXOR TIGHTNESS, UNSPECIFIED LATERALITY: ICD-10-CM

## 2023-12-15 DIAGNOSIS — S83.8X2D INJURY OF MENISCUS OF KNEE, LEFT, SUBSEQUENT ENCOUNTER: ICD-10-CM

## 2023-12-15 DIAGNOSIS — S76.112D QUADRICEPS STRAIN, LEFT, SUBSEQUENT ENCOUNTER: ICD-10-CM

## 2023-12-15 PROCEDURE — 97110 THERAPEUTIC EXERCISES: CPT | Mod: GP

## 2023-12-15 ASSESSMENT — PAIN SCALES - GENERAL: PAINLEVEL_OUTOF10: 1

## 2023-12-15 NOTE — PROGRESS NOTES
Physical Therapy Treatment    Patient Name: Narciso Pardo  MRN: [id number]  Encounter date:  12/15/2023  Time Calculation  Start Time: 1030  Stop Time: 1109  Time Calculation (min): 39 min     PT Therapeutic Procedures Time Entry  Therapeutic Exercise Time Entry: 39    Visit Number:  4 / 7 in 2023 (starting in 2024, anticipate  additional sessions with 10th total visit being a reassessment)  Visit Authorized:  7 in 2023    Current Problem  1. Acute pain of left knee  Follow Up In Physical Therapy      2. Sprain of medial collateral ligament of left knee, subsequent encounter  Follow Up In Physical Therapy      3. Left knee injury, subsequent encounter  Follow Up In Physical Therapy      4. Injury of meniscus of knee, left, subsequent encounter  Follow Up In Physical Therapy    Horizontal tear of the body and posterior horn segments of the  medial meniscus.      5. Hamstring strain, left, subsequent encounter  Follow Up In Physical Therapy      6. Quadriceps strain, left, subsequent encounter  Follow Up In Physical Therapy      7. Patellar maltracking, unspecified laterality  Follow Up In Physical Therapy      8. Hamstring tightness of both lower extremities  Follow Up In Physical Therapy      9. Hip flexor tightness, unspecified laterality  Follow Up In Physical Therapy        Subjective  General  General Comment: Visit #4 (20 per year with 13 used)  Response to Previous Treatment: Compliant with home exercise program  The pt returns to the clinic stating that he felt good after last PT session. He notes that he got his 2nd knee injection on Wednesday, which hurt during, but he thinks it is helping.    Precautions  Precautions  Precautions Comment: none    Pain  Pain Score: 1  Pain Type: Chronic pain  Pain Location: Knee  Pain Orientation: Left    Objective  Pt ambulates into the clinic indep without assistive device - no deviations noted this date.     Treatment:  Ther-ex:  - Nustep level 1, 5   - Standing hip 3  "way with OTB looped around ankles 2x10 bilat  - Standing hamstring curls with OTB looped around ankles 2x10 bilat  - Standing marches with OTB looped around feet 2x10 bilat  - Standing DL heel raises 2x10 ea   - Standing toe raises 2x10 ea  - Moflex gastroc stretch 30\"x 2 ea  - Moflex soleus stretch 30\" x2 ea  - Seated LAQ with 2.5# weights 2x10 ea (1st set in neutral, 2nd set in ER)  - Seated hamstring 30\"x 3 ea     OP EDUCATION:  Outpatient Education  Education Comment: Advanced HEP to include standing resisted exercises with OTB, calf stretch, and HS stretch - pt declined need for pictures.    Assessment:  PT Assessment  Assessment Comment: the pt was able to increase intensity of interventions to include resisted ther-ex.    Pain end of session:  0/10 \"the stretching and exercise feels good\"    Plan:  OP PT Plan  Treatment/Interventions: Aquatic therapy, Cryotherapy, Dry needling, Education/ Instruction, Electrical stimulation, Gait training, Hot pack, Laser, Manual therapy, Neuromuscular re-education, Taping techniques, Therapeutic activities, Therapeutic exercises, Ultrasound  PT Plan: Skilled PT  PT Frequency:  (1-2x/wk)  Duration: 12wks  Onset Date: 11/28/23  Certification Period Start Date: 11/28/23  Certification Period End Date: 02/26/24  Number of Treatments Authorized: 10 then recheck - allowed 20/yr with 13 used on previous POC  Rehab Potential: Good  Plan of Care Agreement: Patient  Continue with current POC/no changes  Consider eccentric quad exercises, such as low step heel tap downs, and manual to L knee (medial side focus) next session.    Assessment of current progress against goals:  Insufficient treatment time to assess progress    Goals:  Active       PT Problem       PT STG       Start:  11/28/23    Expected End:  01/12/24       - Pt will complete the HEP with <3 verbal cues for correction  - Pt will demonstrate 2pt improvement on the NPRS, allowing for improved tolerance of functional " "activities.  - Pt will demonstrate ability to perform 5 step downs from 6\" step with 1 UE support in order to demonstrate improved ability to descend stairs.           PT LTG       Start:  11/28/23    Expected End:  02/26/24       - Pt will be independent in HEP & symptom management  - Pt will demonstrate a 4 pt improvement for knee pain on the NPRS, allowing for improved tolerance to perform daily functional activities.   - Pt will demonstrate at least 120deg knee flexion AROM without onset of pain, allowing for a normal pattern with performance of stairs.  - Pt will demonstrate ability to perform 10 sit to stands without UE use in order to demonstrate improved functional LE strength and improved independence with functional mobility  - Pt will demonstrate ability to perform 10 step downs from 8\" step with 1 UE support and without aggravation of symptoms in order to demonstrate improved ability to descend stairs.  - Pt will demonstrate 5/5 MMT grading throughout hip/knee musculature, allowing for appropriate muscle recruitment during daily activities.   - Pt will demonstrate normal mechanics without pain during gait and performance of stairs, allowing for pt to return to navigating the community.  - Pt will perform tandem stance >30\" with EO leading with ea LE in order to demonstrate improved proprioception and stability.  - Pt will demonstrate improved WOMAC score by 11 points (MCID) in order to demonstrate improved functional mobility.            Patient Stated Goal 1       Start:  11/28/23    Expected End:  02/26/24       \"Get rid of pain\"                      "

## 2023-12-19 ENCOUNTER — APPOINTMENT (OUTPATIENT)
Dept: PHYSICAL THERAPY | Facility: CLINIC | Age: 53
End: 2023-12-19
Payer: COMMERCIAL

## 2023-12-19 NOTE — PATIENT INSTRUCTIONS
PAIN:  A mild to moderate amount of discomfort, tenderness, stiffness, and achiness-caused by the inflammation of the area can be expected for a few days after the injection. This is normal and good as the inflammation is an important part of the healing process.    SKIN: Due to the numbing spray used, you may develop a red, itchy, scaly rash in the area that was sprayed. Should your skin become itchy, wash the area with mild soap and warm water. If needed, you may apply topical over-the-counter Hydrocortisone cream to alleviate any itchiness. AVOID scratching due to risk of infection.,     BATHING/SWIMMING: You may shower after your procedure with regular soap and water, but no baths, no swimming, and no hot tubs for 3-4 days after the procedure.,     ACTIVITIES:  Immediately following the injection, you may resume daily activities (such as work) and light exercise. We suggest that you refrain from strenuous activity and heavy lifting, particularly the injured body part, for a week post-procedure, but sometimes this can change as well.    MOIST HEAT/ICE:  Moist heat may be used on the injection area. NO ICE.  MEDICATION: You may continue your prescribed medications and any over-the-counter supplements; however, it is not recommended to use aspirin or anti-inflammatories (Motrin, Advil, Aleve, Ibuprofen, Naproxen etc.) for up to 2 weeks post procedure. Tylenol Extra Strength, Tylenol Arthritis and/or any prescribed narcotics or muscle relaxants are OK to take.    APPOINTMENTS: You should have a follow-up appointment with Dr. Heart scheduled 1-3 weeks as recommended after your procedure for your next round of injections or to follow-up after you have completed your injection series. Please schedule your follow-up appointment on your way out after your procedure, or call the office to schedule these appointments as soon as possible.    PRECAUTIONS: Smoking, caffeine, alcohol, sex and anti-inflammatories  post-procedure may reduce the effectiveness of Prolotherapy/Neural Prolotherapy/Prolozone injections. Early, rare post procedure problems that can be concerning are as follows: an increase in pain not relieved by medication (over the counter or prescription), a temperature above 101 degrees, bleeding or progressive, extreme swelling, or numbness.     CALL THE OFFICE OR GO TO THE EMERGENCY ROOM IF ANY OF THESE SYMPTOMS OCCUR.   If you have any questions or problems, please call our office at [phone]     Treatment or Intervention:  Continue to alternate ice and moist heat as needed    Continue  Physical Therapy 1-2 times a week for 8-10 weeks with manual therapy as well as dry needling and IASTM especially building up VMO strength   Once again, stressed the importance of wearing shoes with good stability control to help with the biomechanics affecting the knees as well as the lower extremities    Once again, stressed the importance of wearing full foot insoles to help with the biomechanics affecting the knees as well as the lower extremities, Recommendation over-the-counter calcium with vitamin-D 2 -3000+ milligrams a day, as well as OTC symphytum as directed daily to promote bony healing, in addition to a daily multivitamin.    Once again, recommendation over-the-counter Move Free for joint health.    Once again, may take the following: Ibuprofen may alternate with OTC Tylenol Extra Strength or OTC Tylenol Arthritis, taking one every 6-8 hours with food as needed.    Patient advised regarding the risks and/or potential adverse reactions and/or side effects of any prescribed medications along with any over-the-counter medications or any supplements used. Patient advised to seek immediate medical care if any adverse reactions occur. The patient and/or patient(s) parent(s) verbalized their understanding,   Once again, discussed with patient to possibility of surgical intervention in the future to repair meniscal  injury.   Performed injection of Supartz #3 into patient's LEFT knee under ultrasound. The patient tolerated the procedure well and without any adverse effects. Discharge instructions for regenerative/viscosupplementation/corticosteroid injections were reviewed in detail with the patient to the level of their understanding; a copy of these instructions were provided to the patient at the time of this office visit.   Return 1-2 weeks for Supartz #4 into patient's LEFT knee or sooner as needed.    Diagnostic studies:  No additional imaging or laboratory studies are needed at this time.    Activity Instructions, Restrictions, and Accommodations:  The family has been provided a note (after visit summary) outlining all current activity instructions, restrictions, and accommodations.    Consultations/Referrals:  None at this time.    Follow-up 1-2 weeks for Supartz #4 of the patients LEFT KNEE or sooner as needed.

## 2023-12-19 NOTE — PROGRESS NOTES
"Verbal consent of the patient and/or verbal parental consent for patients under the age of 18 have been obtained to conduct a physical examination at this office visit.    Established patient  History Of Present Illness  12/28/23 Narciso Pardo is a 53 y.o. male who presents for Supartz Injection 3/5 injection into the patients Left Knee. He continues to perform his home exercise program previously directed by Physical Therapy. He describes his pain as an ache diffusely throughout his left knee. He notes pain is greatest in the morning or with the first motions while driving. He rates his pain at 2/10 today. He is not utilizing any pain management interventions at this time.  He said he is very happy with the results of the injections so far is definitely noticed a difference not only with his range of motion but also his strength and not having pain like he did previously.    Last Recorded Vitals  /88   Pulse 72   Ht 1.727 m (5' 8\")   Wt 118 kg (260 lb)   BMI 39.53 kg/m²      All previous Progress Notes and imaging results related to this patients chief complaint have been reviewed in preparation for this examination.    Past Medical History  He has a past medical history of Fatty (change of) liver, not elsewhere classified, Other conditions influencing health status, Other obesity (05/11/2017), Other obesity (03/30/2018), Personal history of other diseases of the musculoskeletal system and connective tissue, Personal history of other diseases of the musculoskeletal system and connective tissue, Personal history of other mental and behavioral disorders, and Type 2 diabetes mellitus without complications (CMS/[facility]) (02/03/2017).    Surgical History  He has a past surgical history that includes Shoulder surgery (Left); Wrist surgery (Left); and Carpal tunnel release (Right).     Social History  He reports that he has never smoked. He has never used smokeless tobacco. He reports current alcohol use. He " reports that he does not use drugs.    Family History  Family History   Problem Relation Name Age of Onset    Diabetes Mother      Diabetes Father          Allergies  Patient has no known allergies.    Historical Clinical Intake  May 10, 2023--- EAE --- Verbal consent of the patient and/or verbal parental consent for patients under the age of 18 have been obtained to conduct a physical examination at this office visit. Newly established patient. Pt is a 53yr old male, who works as a . He has been graciously referred to this office by Dr. Villalobos for LEFT knee pain that has been bothering him for a month. No known trauma or injury. 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 By Monday, he noticed pain that started in the back of his knee and then moved to the inside. Pain with flexion, extension and squatting. Pt notes that his pain has caused him to start walking with a limp. He purchased a compression knee sleeve as well as alternating between Aleve and Ibuprofen with no relief in pain prior to seeing his primary care provider. Upon suggestion, patient has recently purchased a hinged knee brace and has been taking Prednisone as prescribed by his primary care provider. He believes that both have currently helped provide some relief in his pain. Rates current pain as a 5/10 describing it as a constant ache, though prior to the new brace and Prednisone, his pain was a 7/10. Denies any numbness or tingling. No previous history of injury.  ---------------------------------------  June 29, 2023 Above note reviewed. Verbal consent of the patient and/or verbal parental consent for patients under the age of 18 have been obtained to conduct a physical examination at this office visit. Narciso returns today for his follow up LEFT knee. Serg presents today with increased pain to LEFT medial knee and additionally LEFT lumbar pain. His gait is ataxic,  although he isn't using any assistance devices. He rates his LEFT knee pain 6/10 described as aching and his Left LUMBAR pain as 8/10 described as burning and stabbing. He has not started physical therapy yet, he was out of town and he plans on starting soon. He is wearing a copper knee brace for additional support and he does take the Move free supplement. He adds that he saw Dr Villalobos for his Lumbar pain and he did some xrays and put him on Prednisone, which did not help. Serg denies and tingling or numbness. He takes ibuprofen for pain and he applies ice for some relief. We discussed treatment options. Patient continues to show indications of a knee sprain for which I recommended that he wear a hinged metal brace. Patient was using an over-the-counter hinged middle brace but states that it did not of the any durability and fell apart. As such we will fit patient for playmaker brace for increased ability under ability for his knee and he will start physical therapy as soon as possible. Patient also complaining of low back pain which may be exacerbated by his knee sprain after exam there indications of a low back strain we will amend his physical therapy order to include as lower back and can re-evaluate at his follow-up appointment. Patient verbalizes understanding and agreement with plan of care.  ---------------------------------------  July 5, 2023. HW  Verbal consent of the patient and/or verbal parental consent for patients under the age of 18 have been obtained to conduct a physical examination at this office visit. This 53 year old male presents with left sided low back pain. He presents today with a limp. He states he was here last Thursday for an appointment for his left knee. He mentioned at the appointment that his low back was bothering him. He states a couple weeks ago he went to [place] to [facility] and when he came home, his low back pain started. He was given a Toradol and Solumedrol injection and he was  feeling better Friday morning when he woke up. He states Friday morning he fell down 6 steps on his buttocks, mostly on his left side. He states he also hit his left knee when he fell, but he is no longer having pain in his knee. He states he has pain all the time, with no relief. He has been taking Cyclobenzaprine and Diclofenac with no relief. He hasn't been able to sleep well due to his pain. He states he has more pain with back extension than back flexion. He denies any radiculitis. He was unable to go to work this weekend. 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 He iced, heated, took medications and tried stretching, but he is still in pain. He rates his pain at 8/10 and describes it as sharp. He is supposed to work the next 3 nights, but doesn't think is able to.  ---------------------------------------  August 2, 2023 Above notes reviewed. Verbal consent of the patient and/or verbal parental consent for patients under the age of 18 have been obtained to conduct a physical examination at this office visit. Narciso returns today for his follow up of his LOW BACK. He states that he is feeling slightly improved since his previous visit. Rates current pain as a 3.5/10 describing it as a stiffness. Pt has remained out of work since last seen in office. He has completed seven sessions of physical therapy since last visit and feels that it has been helping with his pain. We discussed treatment options. Patient continues to have point tenderness over the lower lumbar spine as well as significant restriction especially with extension lateral flexion and rotation. Patient continues to complain of pain although he states that is improved. Patient has only completed 3 weeks of physical therapy and as such we will continue with current treatment options for another 3-4 weeks and re-evaluate at that time. If patient continues to have pain tenderness and restriction we will consider an MRI at that  "time. Patient verbalizes understanding and agreement with plan of care.  ---------------------------------------  August 30, 2023 Above notes reviewed. Verbal consent of the patient and/or verbal parental consent for patients under the age of 18 have been obtained to conduct a physical examination at this office visit. Narciso returns today for his follow up of his low back. Pt states that he is feeling better since his previous visit. He feels that his back is back to normal but his knee pain comes and goes. Rates current back pain as a 2/10 describing it as stiff and his knee pain as a 3/10 describing it as a \"nagging\" pain. He continues with physical therapy with about six sessions left, noting that he feels it is helping his back more than his knee. Pt feels that he can return back to work and will bring in paperwork to be completed to document such. We discussed treatment options. Patient has been doing well with his physical therapy and states that he is feeling largely back to normal. Upon exam patient does continue to have point tenderness over the lumbar spine specifically the L3 vertebrae and associated paraspinal muscles. As such we will order an MRI to better evaluate for any underlying pathology. Patient can return to work without restrictions and we can reevaluate after his MRI or as symptoms change. Patient verbalizes understanding and agreement with plan of care.  ---------------------------------------  September 26, 2023 Above notes reviewed. Verbal consent of the patient and/or verbal parental consent for patients under the age of 18 have been obtained to conduct a physical examination at this office visit. Previously established patientConner Stuart returns today for his MRI follow up of his LUMBAR spine. States that his back is feeling somewhat better rating pain as a 2/10 describing it as stiffness. His knee however is worse, rating it as a 6/10. Pt has started and is continuing with physical " therapy for his knee. He has been wearing the brace and notes that it is supportive but not making it feel better. We reviewed patient MRI results in detail. Patient verbalizes understanding of results. We discussed treatment options and will refer patient to Dr. Sosa for surgical consult and evaluation as recommended by radiology. Patient can follow-up after surgical consult for reevaluation of the knee and we can discuss treatment options at that time such as physical therapy or more advanced imaging. Patient verbalizes understanding and agreement with plan of care.  ---------------------------------------  11/2/2023---Narciso Pardo is a 53 y.o. male presenting with re-injury of his LEFT knee. Unknown trauma or injury. He works at [employer] and is on his feet for twelve hours a day. Pain has worsened over the past two weeks to the point that it is making it difficult to walk. Pain also is disrupting sleep at night. He states that his pain primarily in the medial aspect of his knee. Pain with all range of motion. He does continue to wear the brace he was previously fitted for in this office and notes that while it does support his knee, it does nothing for his pain. Rates current pain as a 8/10 describing it as a constant throbbing pain with sitting and a shocking pain with movement. Pt continues to Move Free and NSAIDs for joint pain over the past month with no improvement in pain. Pt did physical therapy last time he was seen and notes that it did not make any improvements with his pain in his knee which was last done in early September.  ---------------------------------------   11/13/23 Narciso Pardo is a 53 y.o. male who presents to review his LEFT knee MRI. He completed Physical Therapy in September 2023. He continues to perform his home exercise program previously provided to him by Physical Therapy. He describes his pain as sharp and burning along the medial aspect of his left knee. He  notes pain exacerbates when transitioning from sitting to standing, with ambulation and throughout the evening. He states stretching and stairs in both directions also increase pain. He cites pain disturbs his sleep. He walks with a myopathic gait. Narciso works 12 hour swing shifts at [employer] on a SDI and states he has been unable to work a full shift since returning to work in September. He tells that he works on concrete floor and consistently goes up and down on ladders. He rates his pain at 7/10 today, but states pain reaches 10/10 daily. He states that his pain is constant. He continues to take previously prescribed Prednisone and takes OTC Ibuprofen, applies OTC Voltaren gel and ice with no relief. He has also worn previously supplied Playmaker knee brace but says his pain increased with use so he stopped wearing it. 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  He would like a cortisone shot if possible.   ---------------------------------------  11/28/23 Narciso Pardo is a 53 y.o. male who presents for Supartz Injection 1/5 injection into the patients LEFT knee. He states that he is feeling better s/p cortisone injection and pain has improved significantly. He continues to c/o dull achy pain along the medial joint line of his LEFT knee. He has started into physical therapy and is performing his home exercise program as previously instructed. He denies any instability, locking, catching, numbness/tingling, or swelling at this time. He is taking all supplements as previously directed and is wearing shoes with good support as well as OTC insoles as previously recommended.  He rates pain at 2/10 today in his LEFT knee.   ---------------------------------------  12/12/23 Narciso Pardo is a 53 y.o. male who presents for Supartz Injection 2/5 injection into the patients LEFT knee. He denies any pain  currently, but also notes that he is not bowling or doing a lot of activity currently. He is in physical therapy and is also performing his home exercise program as previously directed. He denies any swelling, locking, catching, or instability in his LEFT knee at this time.     Review of Systems:  CONSTITUTIONAL:   Negative for weight change, loss of appetite, fatigue, weakness, fever, chills, night sweats, headaches .           HEENT:   Negative for cold, cough, sore throat, sinus pain, swollen lymph nodes.           OPHTHALMOLOGY:   Negative for diminished vision, blurred vision, loss of vision, double vision.           ALLERGY:   Negative for runny nose, scratchy throat, sinus congestion, rash, facial pressure, nasal congestion, post-nasal drip.           CARDIOLOGY:   Negative for chest pain, palpitations, murmurs, irregular heart beat, shortness of breath, leg edema, dyspnea on exertion, fatigue, dizziness.           RESPIRATORY:   Negative for chest pain, shortness of breath, swelling of the legs, asthma/copd, chest congestion, pain with breathing .           GASTROENTEROLOGY:   Negative for nausea, vomitting, heartburn, constipation, diarrhea, blood in stool, change in bowel habits, black stool.           HEMATOLOGY/LYMPH:   Negative for fatigue, loss of appetitie, easy bruising, easy bleeding, anemia, abnormal bleeding, slow healing.           ENDOCRINOLOGY:   Negative for polyuria, polydipsia, polyphagia, fatigue, weight loss, weight gain, cold intolerance, heat intolerance, diabetes.           MUSCULOSKELETAL:   Positive  for LEFT Knee pain        DERMATOLOGY:   Negative for rash, bruising.           NEUROLOGY:   Negative for tingling, numbness, gait abnormality, paresthesias, weakness, sciatica.         General Examination:  GENERAL APPEARANCE: Appears well, pleasant, and cooperative, NAD.   HEENT: Normal, unremarkable.   NECK: Supple.   HEART: RRR, normal S1S2.   LUNGS: Clear to auscultation bilaterally.    ABDOMEN: Soft, NT/ND, BS present.   EXTREMITIES: No cyanosis, clubbing.   SKIN: No rash or cellulitis.   NEUROLOGIC EXAM: Awake, A&O x 3, CN's II-XII grossly intact.   PSYCH: Good eye contact, appropriate mood and affect      Examination: Overall improvement since starting injections  Left  Knee  Edema Negative  Effusion: Negative.   Percussion Test: Negative  Tuning Fork Test: Negative  Ecchymosis/Bruising: Negative.            Palpation: Overall improvement since starting injections  Left  Knee  Positive Tender to palpation body and posterior joint line medial meniscus LEFT       Orientation: Symmetrical           Range of Motion: Overall improvement since starting injections  Left  Knee  Positive Knee Flexion ( 0-135 degrees) Overall improvement since starting injections  Left  Knee  Positive Knee Extension ( 0-15 degrees) Overall improvement since starting injections  Left  Knee     Muscle Strength:  Overall improvement since starting injections  Left  Knee  Positive +4-+5/+5 Quadricep Extension Causes pain  Positive +4-+5/+5  Hamstring Flexion Causes pain          +5+5 Gastrocnemius  +5+5 Soleus.            Proprioception:      Overall improvement since starting injections  Left  Knee  Pain with Squat: Positive    Pain with Sumo Squat:  Positive    Hop Test: Positive    Single leg balance test Positive            Vascular:   +2/+4 Femoral  +2/+4 Dorsalis Pedis  +2/+4 Posterior Tibial  Capillary Refill less than 2 seconds.     Diagnostic studies:  MRI of the left knee without contrast      INDICATION:  Signs/Symptoms:LEFT KNEE PAIN  COMPARISON: None.      ACCESSION NUMBER(S): [id number]    ORDERING CLINICIAN: JODIE TORRE      TECHNIQUE:  Multiplanar multisequence MRI of the left knee was performed without  intravenous contrast.      FINDINGS:  Menisci:  Lateral meniscus is intact. Horizontal tear of the body and  posterior horn segments of the medial meniscus with fluid signal  reaching the inferior  surface..      Cruciate ligaments: Intact.      Collateral ligaments: Intact.      Tendons:  Intact.      Muscles: Intact.      Bones:  No evidence of acute fracture. Bone marrow signal intensity  is within normal limits. Sequela of chronic Osgood-Schlatter with  corticated prominence of the tibial tubercle.      Articular cartilage:  Lateral compartment: Intact.  Medial compartment: Mild thinning of the posterior weight-bearing  medial femoral condyle involving a small area of proximally about 14  mm AP. Medial tibial plateau cartilage is intact. Patellofemoral  compartment: Focal near full-thickness chondral loss of superior  aspect of the medial trochlear groove with surrounding areas of  delamination.      Miscellaneous: A small knee joint effusion is noted. A small Baker's  cyst is noted..      IMPRESSION:  1. Horizontal tear of the body and posterior horn segments of the  medial meniscus.  2. Mild medial and patellofemoral compartment degenerative changes  with chondral loss as described above.  3. Small Bakers cyst   4. Small joint effusion  5. Chronic Osgood Schlatter disease     ------------------------------------------------------------------------  PROCEDURE:  KNEE LT MIN 4 VIEW - TXR  [id number]  REASON FOR EXAM: ACUTE PAIN OF LEFT KNEE  RESULT: MRN: [id number]     Patient Name: FARZANA RUSH   STUDY: KNEE LT MIN 4 VIEW 5/10/2023 8:35 am  INDICATION: ACUTE PAIN OF LEFT KNEE 53-year-old man with left knee pain for 1 month, medially. No known injury  COMPARISON: Left tibial radiograph 02/03/2017     ACCESSION NUMBER(S): [id number]   ORDERING CLINICIAN: JODIE TORRE     TECHNIQUE: Four views of the left knee were performed.     FINDINGS:  No cortical irregularity or lucency is identified to suggest acute fracture or dislocation of the left knee. There is mild tricompartmental joint space narrowing with small osteophyte formation. No significant suprapatellar knee joint effusion. Prominent enthesophyte at  the tibial tubercle. Small enthesophytes superiorly and inferiorly at the patella.     IMPRESSION:  Degenerative changes of the left knee, as detailed above. No sign of acute fracture or significant suprapatellar knee joint effusion. If symptoms persist, consider MRI for further evaluation.     Dictation workstation:   [id number]   Original Interpreting Physician:   FRANCESCA HOWELL MD  Original Transcribed by/Date: MMODAL May 10 2023  8:13A        Knee - ACL:  Anterior Drawer Test: Positive    Lachman Test: Positive       Lelli Test:  Positive       KNEE - MCL / LCL:  Valgus Stress Test:  90 degrees: Negative, 20-30 degrees: Negative.   Varus Stress Test:  90 degrees: Positive  20-30 degrees: Positive   Apley Distraction Test: Positive Lateral.   Thessaly Test: Positive Anteior Lateral Meniscus, Posterior Lateral Meniscus, Origins and Insertion LCL, Distal Hamstring.         KNEE - IT BAND:  Cayla Test: Negative.   Noble Test: Negative.          KNEE - MENISCUS:Overall improvement since starting injections  Left  Knee  Apley Compression Test: Positive  Lateral joint line.   Stienmann Test:  Positive    Terrance Test: Positive  Lateral joint line.   Bounce Home Test:  Positive   Thessaly Test:  Positive Anterior Lateral Joint Line Pain and Posterior Lateral Joint Line Pain.            KNEE - PATELLA:Overall improvement since starting injections  Left  Knee  Apprehension Test:  Positive   Glide Test:  Positive   Grind Test: Positive   Patella Tracking Test: Positive               KNEE - QUADRICEPS:  Overall improvement since starting injections  Left  Knee  VMO Test: Positive    VLO Test:  Negative.              Feet/Foot: Positive  BILATERAL  Valgus foot     Assessment   1. Localized osteoarthritis of left knee  L Inj/Asp: L knee    Point of Care Ultrasound      2. Injury of meniscus of knee, left, subsequent encounter  L Inj/Asp: L knee      3. Synovial cyst of left popliteal space  L Inj/Asp: L knee      4. Acute  pain of left knee  L Inj/Asp: L knee      5. Sprain of medial collateral ligament of left knee, subsequent encounter  L Inj/Asp: L knee      6. Left knee injury, subsequent encounter  L Inj/Asp: L knee      7. Patellar maltracking, unspecified laterality  L Inj/Asp: L knee      8. Hamstring tightness of both lower extremities  L Inj/Asp: L knee      9. Hip flexor tightness, unspecified laterality  L Inj/Asp: L knee      10. Instability of left knee joint  L Inj/Asp: L knee      11. Hamstring strain, left, subsequent encounter  L Inj/Asp: L knee      12. Quadriceps strain, left, subsequent encounter  L Inj/Asp: L knee      13. Leg length discrepancy        14. Valgus deformity of both feet            Procedure   Time Out (5 Minutes): Yes  Patient Name: Narciso Pardo  YOB: 1970  Procedure: Supartz Injection 3/5  Needed Supplies Available: Correct Supplies  Laterality: Left Knee  Site Verified and Marked: Correct Site(s) Marked  Timeout Performed by Provider: Dr. Douglas Heart D.O.  Staff Initials:     L Inj/Asp: L knee on 12/28/2023 11:00 AM  Indications: pain  Details: 22 G needle, ultrasound-guided  Medications: 20 mg sodium hyaluronate 10 mg/mL  Outcome: tolerated well, no immediate complications  Procedure, treatment alternatives, risks and benefits explained, specific risks discussed. Consent was given by the patient. Immediately prior to procedure a time out was called to verify the correct patient, procedure, equipment, support staff and site/side marked as required.            Patient is informed and consent has been signed by the patient if over 18 years old., Patient parent and/or legal guardian is informed and consent has been signed., Patient and/or parent and/or legal guardian accept all risks, benefits, and possible complications associated with procedure(s) and/or manipulation(s) and/or injection(s)., Patient and/or parent and/or legal guardian deny patient allergy or known  complications with any of the medications, instruments, products, and/or techniques used., All questions and concerns were answered and/or addressed with the patient and/or parent and/or legal guardian., The patient and/or parent and/or legal guardian agree to proceed with the procedure(s) and/or manipulation(s) and/or injection(s)., Prep: The area was cleansed with a mixture of equal parts rubbing alcohol 70% and Hibclens., Utilizing clean technique, the injection site(s) were marked with a skin marker., and Injection site(s) were anesthestized with topical analgesic spray prior to injection.    Performed regenerative Supartz Injection 3/5 into the patients Left Knee, under ultrasound.  Supartz FX (25mg/2.5ml)     Band-aid and/or compressive bandage was placed over the injection site(s). After the injection(s) performed osteopathic manipulation therapy to the affected area(s) to help increase blood flow to aid with the healing process as well as circulation of the medication. The patient tolerated the procedure well without any complications. The patient was instructed to gently massage the treatment site(s) as well as to apply moist heat to the affected area(s). Additionally, the patient was instructed to contact the office immediately with any complications or concerns.    The NurseChar was present in the room during the entire procedure.    The following discharge instructions were reviewed in detail with the patient to the level of their understanding:    PAIN:  A mild to moderate amount of discomfort, tenderness, stiffness, and achiness-caused by the inflammation of the area can be expected for a few days after the injection. This is normal and good as the inflammation is an important part of the healing process.    SKIN: Due to the numbing spray used, you may develop a red, itchy, scaly rash in the area that was sprayed. Should your skin become itchy, wash the area with mild soap and warm water. If needed,  you may apply topical over-the-counter Hydrocortisone cream to alleviate any itchiness. AVOID scratching due to risk of infection.,     BATHING/SWIMMING: You may shower after your procedure with regular soap and water, but no baths, no swimming, and no hot tubs for 3-4 days after the procedure.,     ACTIVITIES:  Immediately following the injection, you may resume daily activities (such as work) and light exercise. We suggest that you refrain from strenuous activity and heavy lifting, particularly the injured body part, for a week post-procedure, but sometimes this can change as well.    MOIST HEAT/ICE:  Moist heat may be used on the injection area. NO ICE.  MEDICATION: You may continue your prescribed medications and any over-the-counter supplements; however, it is not recommended to use aspirin or anti-inflammatories (Motrin, Advil, Aleve, Ibuprofen, Naproxen etc.) for up to 2 weeks post procedure. Tylenol Extra Strength, Tylenol Arthritis and/or any prescribed narcotics or muscle relaxants are OK to take.    APPOINTMENTS: You should have a follow-up appointment with Dr. Heart scheduled 1-3 weeks as recommended after your procedure for your next round of injections or to follow-up after you have completed your injection series. Please schedule your follow-up appointment on your way out after your procedure, or call the office to schedule these appointments as soon as possible.    PRECAUTIONS: Smoking, caffeine, alcohol, sex and anti-inflammatories post-procedure may reduce the effectiveness of Prolotherapy/Neural Prolotherapy/Prolozone injections. Early, rare post procedure problems that can be concerning are as follows: an increase in pain not relieved by medication (over the counter or prescription), a temperature above 101 degrees, bleeding or progressive, extreme swelling, or numbness.     CALL THE OFFICE OR GO TO THE EMERGENCY ROOM IF ANY OF THESE SYMPTOMS OCCUR.   If you have any questions or problems,  please call our office at [phone]     Treatment or Intervention:  Continue to alternate ice and moist heat as needed    Continue  Physical Therapy 1-2 times a week for 8-10 weeks with manual therapy as well as dry needling and IASTM especially building up VMO strength   Once again, stressed the importance of wearing shoes with good stability control to help with the biomechanics affecting the knees as well as the lower extremities    Once again, stressed the importance of wearing full foot insoles to help with the biomechanics affecting the knees as well as the lower extremities, Recommendation over-the-counter calcium with vitamin-D 2 -3000+ milligrams a day, as well as OTC symphytum as directed daily to promote bony healing, in addition to a daily multivitamin.    Once again, recommendation over-the-counter Move Free for joint health.    Once again, may take the following: Ibuprofen may alternate with OTC Tylenol Extra Strength or OTC Tylenol Arthritis, taking one every 6-8 hours with food as needed.    Patient advised regarding the risks and/or potential adverse reactions and/or side effects of any prescribed medications along with any over-the-counter medications or any supplements used. Patient advised to seek immediate medical care if any adverse reactions occur. The patient and/or patient(s) parent(s) verbalized their understanding,   Once again, discussed with patient to possibility of surgical intervention in the future to repair meniscal injury.   Performed injection of Supartz #3 into patient's LEFT knee under ultrasound. The patient tolerated the procedure well and without any adverse effects. Discharge instructions for regenerative/viscosupplementation/corticosteroid injections were reviewed in detail with the patient to the level of their understanding; a copy of these instructions were provided to the patient at the time of this office visit.   Follow-up next week for Supartz #4 into patient's  LEFT knee or sooner as needed.    I, Aracelis Woodward, attest this documentation has been prepared under the direction and in the presence of Darline Heart D.O. By signing below, I, Douglas Heart D.O, personally performed the services described in this documentation. All medical record entries made by the scribe were at my direction and in my presence. I have reviewed the chart and agree that the record reflects my personal performance and is accurate and complete. Please note that this report has been partially produced using speech recognition software. It may contain errors related to grammar, punctuation or spelling. Electronically signed, but not reviewed. Douglas Heart D.O. Director of Sports Medicine.     MONIKA BAXTER on 12/28/23 at 1:41 PM.     Douglas Heart DO, [facility]

## 2023-12-21 ENCOUNTER — APPOINTMENT (OUTPATIENT)
Dept: PHYSICAL THERAPY | Facility: CLINIC | Age: 53
End: 2023-12-21
Payer: COMMERCIAL

## 2023-12-27 NOTE — PROGRESS NOTES
"Verbal consent of the patient and/or verbal parental consent for patients under the age of 18 have been obtained to conduct a physical examination at this office visit.    Established patient  History Of Present Illness  01/04/24 Narciso Pardo is a 53 y.o. male who presents for Supartz Injection 4/5 injection into his Left knee. He notes mild improvement since beginning viscosupplementation injections and performing his home exercise program. He reports generalized stiffness upon waking in the morning as well as while driving for extended periods of time that works itself out with activity/movement. He rates his pain at 1/10 today. He uses ice for pain management with mild, temporary relief. He wears a sleeve type brace with activity and has a custom brace previously supplied. He continues to be very happy with the results of the injections not only with his range of motion but also pain.  He says he will know for sure how well he is doing next week when he leaves for [place] because he has lots of stuff lined up for walking and hiking.      Last Recorded Vitals  /82   Pulse 76   Ht 1.727 m (5' 8\")   Wt 118 kg (260 lb)   BMI 39.53 kg/m²      All previous Progress Notes and imaging results related to this patients chief complaint have been reviewed in preparation for this examination.    Past Medical History  He has a past medical history of Fatty (change of) liver, not elsewhere classified, Other conditions influencing health status, Other obesity (05/11/2017), Other obesity (03/30/2018), Personal history of other diseases of the musculoskeletal system and connective tissue, Personal history of other diseases of the musculoskeletal system and connective tissue, Personal history of other mental and behavioral disorders, and Type 2 diabetes mellitus without complications (CMS/[facility]) (02/03/2017).    Surgical History  He has a past surgical history that includes Shoulder surgery (Left); Wrist surgery " (Left); and Carpal tunnel release (Right).     Social History  He reports that he has never smoked. He has never used smokeless tobacco. He reports current alcohol use. He reports that he does not use drugs.    Family History  Family History   Problem Relation Name Age of Onset    Diabetes Mother      Diabetes Father          Allergies  Patient has no known allergies.    Historical Clinical Intake  May 10, 2023--- EAE --- Verbal consent of the patient and/or verbal parental consent for patients under the age of 18 have been obtained to conduct a physical examination at this office visit. Newly established patient. Pt is a 53yr old male, who works as a . He has been graciously referred to this office by Dr. Villalobos for LEFT knee pain that has been bothering him for a month. No known trauma or injury. 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 By Monday, he noticed pain that started in the back of his knee and then moved to the inside. Pain with flexion, extension and squatting. Pt notes that his pain has caused him to start walking with a limp. He purchased a compression knee sleeve as well as alternating between Aleve and Ibuprofen with no relief in pain prior to seeing his primary care provider. Upon suggestion, patient has recently purchased a hinged knee brace and has been taking Prednisone as prescribed by his primary care provider. He believes that both have currently helped provide some relief in his pain. Rates current pain as a 5/10 describing it as a constant ache, though prior to the new brace and Prednisone, his pain was a 7/10. Denies any numbness or tingling. No previous history of injury.  ---------------------------------------  June 29, 2023 Above note reviewed. Verbal consent of the patient and/or verbal parental consent for patients under the age of 18 have been obtained to conduct a physical examination at this office  visit. Narciso returns today for his follow up LEFT knee. Serg presents today with increased pain to LEFT medial knee and additionally LEFT lumbar pain. His gait is ataxic, although he isn't using any assistance devices. He rates his LEFT knee pain 6/10 described as aching and his Left LUMBAR pain as 8/10 described as burning and stabbing. He has not started physical therapy yet, he was out of town and he plans on starting soon. He is wearing a copper knee brace for additional support and he does take the Move free supplement. He adds that he saw Dr Villalobos for his Lumbar pain and he did some xrays and put him on Prednisone, which did not help. Serg denies and tingling or numbness. He takes ibuprofen for pain and he applies ice for some relief. We discussed treatment options. Patient continues to show indications of a knee sprain for which I recommended that he wear a hinged metal brace. Patient was using an over-the-counter hinged middle brace but states that it did not of the any durability and fell apart. As such we will fit patient for playmaker brace for increased ability under ability for his knee and he will start physical therapy as soon as possible. Patient also complaining of low back pain which may be exacerbated by his knee sprain after exam there indications of a low back strain we will amend his physical therapy order to include as lower back and can re-evaluate at his follow-up appointment. Patient verbalizes understanding and agreement with plan of care.  ---------------------------------------  July 5, 2023. HW  Verbal consent of the patient and/or verbal parental consent for patients under the age of 18 have been obtained to conduct a physical examination at this office visit. This 53 year old male presents with left sided low back pain. He presents today with a limp. He states he was here last Thursday for an appointment for his left knee. He mentioned at the appointment that his low back was  bothering him. He states a couple weeks ago he went to [place] to [place] and when he came home, his low back pain started. He was given a Toradol and Solumedrol injection and he was feeling better Friday morning when he woke up. He states Friday morning he fell down 6 steps on his buttocks, mostly on his left side. He states he also hit his left knee when he fell, but he is no longer having pain in his knee. He states he has pain all the time, with no relief. He has been taking Cyclobenzaprine and Diclofenac with no relief. He hasn't been able to sleep well due to his pain. He states he has more pain with back extension than back flexion. He denies any radiculitis. He was unable to go to work this weekend. 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 He iced, heated, took medications and tried stretching, but he is still in pain. He rates his pain at 8/10 and describes it as sharp. He is supposed to work the next 3 nights, but doesn't think is able to.  ---------------------------------------  August 2, 2023 Above notes reviewed. Verbal consent of the patient and/or verbal parental consent for patients under the age of 18 have been obtained to conduct a physical examination at this office visit. Narciso returns today for his follow up of his LOW BACK. He states that he is feeling slightly improved since his previous visit. Rates current pain as a 3.5/10 describing it as a stiffness. Pt has remained out of work since last seen in office. He has completed seven sessions of physical therapy since last visit and feels that it has been helping with his pain. We discussed treatment options. Patient continues to have point tenderness over the lower lumbar spine as well as significant restriction especially with extension lateral flexion and rotation. Patient continues to complain of pain although he states that is improved. Patient has only completed 3 weeks of physical therapy and as such we will continue  "with current treatment options for another 3-4 weeks and re-evaluate at that time. If patient continues to have pain tenderness and restriction we will consider an MRI at that time. Patient verbalizes understanding and agreement with plan of care.  ---------------------------------------  August 30, 2023 Above notes reviewed. Verbal consent of the patient and/or verbal parental consent for patients under the age of 18 have been obtained to conduct a physical examination at this office visit. Narciso returns today for his follow up of his low back. Pt states that he is feeling better since his previous visit. He feels that his back is back to normal but his knee pain comes and goes. Rates current back pain as a 2/10 describing it as stiff and his knee pain as a 3/10 describing it as a \"nagging\" pain. He continues with physical therapy with about six sessions left, noting that he feels it is helping his back more than his knee. Pt feels that he can return back to work and will bring in paperwork to be completed to document such. We discussed treatment options. Patient has been doing well with his physical therapy and states that he is feeling largely back to normal. Upon exam patient does continue to have point tenderness over the lumbar spine specifically the L3 vertebrae and associated paraspinal muscles. As such we will order an MRI to better evaluate for any underlying pathology. Patient can return to work without restrictions and we can reevaluate after his MRI or as symptoms change. Patient verbalizes understanding and agreement with plan of care.  ---------------------------------------  September 26, 2023 Above notes reviewed. Verbal consent of the patient and/or verbal parental consent for patients under the age of 18 have been obtained to conduct a physical examination at this office visit. Previously established patient. Narciso returns today for his MRI follow up of his LUMBAR spine. States that his back " is feeling somewhat better rating pain as a 2/10 describing it as stiffness. His knee however is worse, rating it as a 6/10. Pt has started and is continuing with physical therapy for his knee. He has been wearing the brace and notes that it is supportive but not making it feel better. We reviewed patient MRI results in detail. Patient verbalizes understanding of results. We discussed treatment options and will refer patient to Dr. Sosa for surgical consult and evaluation as recommended by radiology. Patient can follow-up after surgical consult for reevaluation of the knee and we can discuss treatment options at that time such as physical therapy or more advanced imaging. Patient verbalizes understanding and agreement with plan of care.  ---------------------------------------  11/2/2023---Narciso Pardo is a 53 y.o. male presenting with re-injury of his LEFT knee. Unknown trauma or injury. He works at [employer] and is on his feet for twelve hours a day. Pain has worsened over the past two weeks to the point that it is making it difficult to walk. Pain also is disrupting sleep at night. He states that his pain primarily in the medial aspect of his knee. Pain with all range of motion. He does continue to wear the brace he was previously fitted for in this office and notes that while it does support his knee, it does nothing for his pain. Rates current pain as a 8/10 describing it as a constant throbbing pain with sitting and a shocking pain with movement. Pt continues to Move Free and NSAIDs for joint pain over the past month with no improvement in pain. Pt did physical therapy last time he was seen and notes that it did not make any improvements with his pain in his knee which was last done in early September.  ---------------------------------------   11/13/23 Narciso Pardo is a 53 y.o. male who presents to review his LEFT knee MRI. He completed Physical Therapy in September 2023. He continues to  perform his home exercise program previously provided to him by Physical Therapy. He describes his pain as sharp and burning along the medial aspect of his left knee. He notes pain exacerbates when transitioning from sitting to standing, with ambulation and throughout the evening. He states stretching and stairs in both directions also increase pain. He cites pain disturbs his sleep. He walks with a myopathic gait. Narciso works 12 hour swing shifts at [employer] on a [employer] and states he has been unable to work a full shift since returning to work in September. He tells that he works on concrete floor and consistently goes up and down on ladders. He rates his pain at 7/10 today, but states pain reaches 10/10 daily. He states that his pain is constant. He continues to take previously prescribed Prednisone and takes OTC Ibuprofen, applies OTC Voltaren gel and ice with no relief. He has also worn previously supplied Playmaker knee brace but says his pain increased with use so he stopped wearing it. 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  He would like a cortisone shot if possible.   ---------------------------------------  11/28/23 Narciso Pardo is a 53 y.o. male who presents for Supartz Injection 1/5 injection into the patients LEFT knee. He states that he is feeling better s/p cortisone injection and pain has improved significantly. He continues to c/o dull achy pain along the medial joint line of his LEFT knee. He has started into physical therapy and is performing his home exercise program as previously instructed. He denies any instability, locking, catching, numbness/tingling, or swelling at this time. He is taking all supplements as previously directed and is wearing shoes with good support as well as OTC insoles as previously recommended.  He rates pain at 2/10 today in his LEFT knee.    ---------------------------------------  12/12/23 Narciso Pardo is a 53 y.o. male who presents for Supartz Injection 2/5 injection into the patients LEFT knee. He denies any pain currently, but also notes that he is not bowling or doing a lot of activity currently. He is in physical therapy and is also performing his home exercise program as previously directed. He denies any swelling, locking, catching, or instability in his LEFT knee at this time.      Review of Systems:  CONSTITUTIONAL:   Negative for weight change, loss of appetite, fatigue, weakness, fever, chills, night sweats, headaches .           HEENT:   Negative for cold, cough, sore throat, sinus pain, swollen lymph nodes.           OPHTHALMOLOGY:   Negative for diminished vision, blurred vision, loss of vision, double vision.           ALLERGY:   Negative for runny nose, scratchy throat, sinus congestion, rash, facial pressure, nasal congestion, post-nasal drip.           CARDIOLOGY:   Negative for chest pain, palpitations, murmurs, irregular heart beat, shortness of breath, leg edema, dyspnea on exertion, fatigue, dizziness.           RESPIRATORY:   Negative for chest pain, shortness of breath, swelling of the legs, asthma/copd, chest congestion, pain with breathing .           GASTROENTEROLOGY:   Negative for nausea, vomitting, heartburn, constipation, diarrhea, blood in stool, change in bowel habits, black stool.           HEMATOLOGY/LYMPH:   Negative for fatigue, loss of appetitie, easy bruising, easy bleeding, anemia, abnormal bleeding, slow healing.           ENDOCRINOLOGY:   Negative for polyuria, polydipsia, polyphagia, fatigue, weight loss, weight gain, cold intolerance, heat intolerance, diabetes.           MUSCULOSKELETAL:   Positive  for LEFT Knee pain        DERMATOLOGY:   Negative for rash, bruising.           NEUROLOGY:   Negative for tingling, numbness, gait abnormality, paresthesias, weakness, sciatica.         General  Examination:  GENERAL APPEARANCE: Appears well, pleasant, and cooperative, NAD.   HEENT: Normal, unremarkable.   NECK: Supple.   HEART: RRR, normal S1S2.   LUNGS: Clear to auscultation bilaterally.   ABDOMEN: Soft, NT/ND, BS present.   EXTREMITIES: No cyanosis, clubbing.   SKIN: No rash or cellulitis.   NEUROLOGIC EXAM: Awake, A&O x 3, CN's II-XII grossly intact.   PSYCH: Good eye contact, appropriate mood and affect      Examination: Overall improvement since starting injections  Left  Knee  Edema Negative  Effusion: Negative.   Percussion Test: Negative  Tuning Fork Test: Negative  Ecchymosis/Bruising: Negative.            Palpation: Overall improvement since starting injections  Left  Knee  Positive Tender to palpation body and posterior joint line medial meniscus LEFT       Orientation: Symmetrical           Range of Motion: Overall improvement since starting injections  Left  Knee  Positive Knee Flexion ( 0-135 degrees) Overall improvement since starting injections  Left  Knee  Positive Knee Extension ( 0-15 degrees) Overall improvement since starting injections  Left  Knee     Muscle Strength:  Overall improvement since starting injections  Left  Knee  Positive +4-+5/+5 Quadricep Extension Causes pain  Positive +4-+5/+5  Hamstring Flexion Causes pain          +5+5 Gastrocnemius  +5+5 Soleus.            Proprioception:      Overall improvement since starting injections  Left  Knee  Pain with Squat: Positive    Pain with Sumo Squat:  Positive    Hop Test: Positive    Single leg balance test Positive            Vascular:   +2/+4 Femoral  +2/+4 Dorsalis Pedis  +2/+4 Posterior Tibial  Capillary Refill less than 2 seconds.     Diagnostic studies:  MRI of the left knee without contrast      INDICATION:  Signs/Symptoms:LEFT KNEE PAIN  COMPARISON: None.      ACCESSION NUMBER(S): [id number]    ORDERING CLINICIAN: JODIE TORRE      TECHNIQUE:  Multiplanar multisequence MRI of the left knee was performed  without  intravenous contrast.      FINDINGS:  Menisci:  Lateral meniscus is intact. Horizontal tear of the body and  posterior horn segments of the medial meniscus with fluid signal  reaching the inferior surface..      Cruciate ligaments: Intact.      Collateral ligaments: Intact.      Tendons:  Intact.      Muscles: Intact.      Bones:  No evidence of acute fracture. Bone marrow signal intensity  is within normal limits. Sequela of chronic Osgood-Schlatter with  corticated prominence of the tibial tubercle.      Articular cartilage:  Lateral compartment: Intact.  Medial compartment: Mild thinning of the posterior weight-bearing  medial femoral condyle involving a small area of proximally about 14  mm AP. Medial tibial plateau cartilage is intact. Patellofemoral  compartment: Focal near full-thickness chondral loss of superior  aspect of the medial trochlear groove with surrounding areas of  delamination.      Miscellaneous: A small knee joint effusion is noted. A small Baker's  cyst is noted..      IMPRESSION:  1. Horizontal tear of the body and posterior horn segments of the  medial meniscus.  2. Mild medial and patellofemoral compartment degenerative changes  with chondral loss as described above.  3. Small Bakers cyst   4. Small joint effusion  5. Chronic Osgood Schlatter disease     ------------------------------------------------------------------------  PROCEDURE:  KNEE LT MIN 4 VIEW - TXR  [id number]  REASON FOR EXAM: ACUTE PAIN OF LEFT KNEE  RESULT: MRN: [id number]     Patient Name: FARZANA RUSH   STUDY: KNEE LT MIN 4 VIEW 5/10/2023 8:35 am  INDICATION: ACUTE PAIN OF LEFT KNEE 53-year-old man with left knee pain for 1 month, medially. No known injury  COMPARISON: Left tibial radiograph 02/03/2017     ACCESSION NUMBER(S): [id number]   ORDERING CLINICIAN: JODIE TORRE     TECHNIQUE: Four views of the left knee were performed.     FINDINGS:  No cortical irregularity or lucency is identified to suggest acute  fracture or dislocation of the left knee. There is mild tricompartmental joint space narrowing with small osteophyte formation. No significant suprapatellar knee joint effusion. Prominent enthesophyte at the tibial tubercle. Small enthesophytes superiorly and inferiorly at the patella.     IMPRESSION:  Degenerative changes of the left knee, as detailed above. No sign of acute fracture or significant suprapatellar knee joint effusion. If symptoms persist, consider MRI for further evaluation.     Dictation workstation:   [id number]   Original Interpreting Physician:   FRANCESCA HOWELL MD  Original Transcribed by/Date: MMODAL May 10 2023  8:13A        Knee - ACL:  Anterior Drawer Test: Positive    Lachman Test: Positive       Lelli Test:  Positive       KNEE - MCL / LCL:  Valgus Stress Test:  90 degrees: Negative, 20-30 degrees: Negative.   Varus Stress Test:  90 degrees: Positive  20-30 degrees: Positive   Apley Distraction Test: Positive Lateral.   Thessaly Test: Positive Anteior Lateral Meniscus, Posterior Lateral Meniscus, Origins and Insertion LCL, Distal Hamstring.         KNEE - IT BAND:  Cayla Test: Negative.   Noble Test: Negative.          KNEE - MENISCUS:Overall improvement since starting injections  Left  Knee  Apley Compression Test: Positive  Lateral joint line.   Stienmann Test:  Positive    Terrance Test: Positive  Lateral joint line.   Bounce Home Test:  Positive   Thessaly Test:  Positive Anterior Lateral Joint Line Pain and Posterior Lateral Joint Line Pain.            KNEE - PATELLA:Overall improvement since starting injections  Left  Knee  Apprehension Test:  Positive   Glide Test:  Positive   Grind Test: Positive   Patella Tracking Test: Positive               KNEE - QUADRICEPS:  Overall improvement since starting injections  Left  Knee  VMO Test: Positive    VLO Test:  Negative.              Feet/Foot: Positive  BILATERAL  Valgus foot     Assessment   1. Localized osteoarthritis of left knee   Point of Care Ultrasound    L Inj/Asp: L knee      2. Injury of meniscus of knee, left, subsequent encounter  L Inj/Asp: L knee      3. Synovial cyst of left popliteal space  L Inj/Asp: L knee      4. Acute pain of left knee  L Inj/Asp: L knee      5. Sprain of medial collateral ligament of left knee, subsequent encounter  L Inj/Asp: L knee      6. Left knee injury, subsequent encounter  L Inj/Asp: L knee      7. Patellar maltracking, unspecified laterality  L Inj/Asp: L knee      8. Hamstring tightness of both lower extremities  L Inj/Asp: L knee      9. Hip flexor tightness, unspecified laterality  L Inj/Asp: L knee      10. Instability of left knee joint  L Inj/Asp: L knee      11. Hamstring strain, left, subsequent encounter  L Inj/Asp: L knee      12. Quadriceps strain, left, subsequent encounter  L Inj/Asp: L knee      13. Leg length discrepancy        14. Valgus deformity of both feet            Procedure   Time Out (5 Minutes): Yes  Patient Name: Narciso Pardo  YOB: 1970  Procedure: Supartz Injection 4/5  Needed Supplies Available: Correct Supplies  Laterality: LeftKnee  Site Verified and Marked: Correct Site(s) Marked  Timeout Performed by Provider: Dr. Douglas Heart, D.O.  Staff Initials:     L Inj/Asp: L knee on 1/4/2024 11:15 AM  Indications: pain  Details: 22 G needle, ultrasound-guided  Medications: 20 mg sodium hyaluronate 10 mg/mL  Outcome: tolerated well, no immediate complications  Procedure, treatment alternatives, risks and benefits explained, specific risks discussed. Consent was given by the patient. Immediately prior to procedure a time out was called to verify the correct patient, procedure, equipment, support staff and site/side marked as required.            Patient is informed and consent has been signed by the patient if over 18 years old., Patient parent and/or legal guardian is informed and consent has been signed., Patient and/or parent and/or legal guardian  accept all risks, benefits, and possible complications associated with procedure(s) and/or manipulation(s) and/or injection(s)., Patient and/or parent and/or legal guardian deny patient allergy or known complications with any of the medications, instruments, products, and/or techniques used., All questions and concerns were answered and/or addressed with the patient and/or parent and/or legal guardian., The patient and/or parent and/or legal guardian agree to proceed with the procedure(s) and/or manipulation(s) and/or injection(s)., Prep: The area was cleansed with a mixture of equal parts rubbing alcohol 70% and Hibclens., Utilizing clean technique, the injection site(s) were marked with a skin marker., and Injection site(s) were anesthestized with topical analgesic spray prior to injection.    Performed regenerative Supartz Injection 4/5 into the patients Left Knee, under ultrasound.  Supartz FX (25mg/2.5ml)     Band-aid and/or compressive bandage was placed over the injection site(s). After the injection(s) performed osteopathic manipulation therapy to the affected area(s) to help increase blood flow to aid with the healing process as well as circulation of the medication. The patient tolerated the procedure well without any complications. The patient was instructed to gently massage the treatment site(s) as well as to apply moist heat to the affected area(s). Additionally, the patient was instructed to contact the office immediately with any complications or concerns.    The Nurse, Char, and ScribeAracelis, were present in the room during the entire procedure.    The following discharge instructions were reviewed in detail with the patient to the level of their understanding:    PAIN:  A mild to moderate amount of discomfort, tenderness, stiffness, and achiness-caused by the inflammation of the area can be expected for a few days after the injection. This is normal and good as the inflammation is an important  part of the healing process.    SKIN: Due to the numbing spray used, you may develop a red, itchy, scaly rash in the area that was sprayed. Should your skin become itchy, wash the area with mild soap and warm water. If needed, you may apply topical over-the-counter Hydrocortisone cream to alleviate any itchiness. AVOID scratching due to risk of infection.,     BATHING/SWIMMING: You may shower after your procedure with regular soap and water, but no baths, no swimming, and no hot tubs for 3-4 days after the procedure.,     ACTIVITIES:  Immediately following the injection, you may resume daily activities (such as work) and light exercise. We suggest that you refrain from strenuous activity and heavy lifting, particularly the injured body part, for a week post-procedure, but sometimes this can change as well.    MOIST HEAT/ICE:  Moist heat may be used on the injection area. NO ICE.  MEDICATION: You may continue your prescribed medications and any over-the-counter supplements; however, it is not recommended to use aspirin or anti-inflammatories (Motrin, Advil, Aleve, Ibuprofen, Naproxen etc.) for up to 2 weeks post procedure. Tylenol Extra Strength, Tylenol Arthritis and/or any prescribed narcotics or muscle relaxants are OK to take.    APPOINTMENTS: You should have a follow-up appointment with Dr. Heart scheduled 1-3 weeks as recommended after your procedure for your next round of injections or to follow-up after you have completed your injection series. Please schedule your follow-up appointment on your way out after your procedure, or call the office to schedule these appointments as soon as possible.    PRECAUTIONS: Smoking, caffeine, alcohol, sex and anti-inflammatories post-procedure may reduce the effectiveness of Prolotherapy/Neural Prolotherapy/Prolozone injections. Early, rare post procedure problems that can be concerning are as follows: an increase in pain not relieved by medication (over the counter or  prescription), a temperature above 101 degrees, bleeding or progressive, extreme swelling, or numbness.     CALL THE OFFICE OR GO TO THE EMERGENCY ROOM IF ANY OF THESE SYMPTOMS OCCUR.   If you have any questions or problems, please call our office at [phone]     Treatment or Intervention:  Continue to alternate ice and moist heat as needed    Continue  Physical Therapy 1-2 times a week for 8-10 weeks with manual therapy as well as dry needling and IASTM especially building up VMO strength   Once again, stressed the importance of wearing shoes with good stability control to help with the biomechanics affecting the knees as well as the lower extremities    Once again, stressed the importance of wearing full foot insoles to help with the biomechanics affecting the knees as well as the lower extremities, Recommendation over-the-counter calcium with vitamin-D 2 -3000+ milligrams a day, as well as OTC symphytum as directed daily to promote bony healing, in addition to a daily multivitamin.    Once again, recommendation over-the-counter Move Free for joint health.    Once again, may take the following: Ibuprofen may alternate with OTC Tylenol Extra Strength or OTC Tylenol Arthritis, taking one every 6-8 hours with food as needed.    Patient advised regarding the risks and/or potential adverse reactions and/or side effects of any prescribed medications along with any over-the-counter medications or any supplements used. Patient advised to seek immediate medical care if any adverse reactions occur. The patient and/or patient(s) parent(s) verbalized their understanding,   Once again, discussed with patient to possibility of surgical intervention in the future to repair meniscal injury.   Performed injection of Supartz #4 into patient's LEFT knee under ultrasound. The patient tolerated the procedure well and without any adverse effects. Discharge instructions for regenerative/viscosupplementation/corticosteroid injections  were reviewed in detail with the patient to the level of their understanding; a copy of these instructions were provided to the patient at the time of this office visit.   Follow-up in 1 week for Supartz #5 into the patients LEFT knee or sooner as needed.    I, Aracelis Woodward, attest this documentation has been prepared under the direction and in the presence of Darline Heart D.O. By signing below, IDouglas D.O, personally performed the services described in this documentation. All medical record entries made by the scribe were at my direction and in my presence. I have reviewed the chart and agree that the record reflects my personal performance and is accurate and complete. Please note that this report has been partially produced using speech recognition software. It may contain errors related to grammar, punctuation or spelling. Electronically signed, but not reviewed. Douglas Heart D.O. Director of Sports Medicine.     MONIKA BAXTER on 1/4/24 at 3:06 PM.     Douglas Heart DO, FAOASM

## 2023-12-28 ENCOUNTER — OFFICE VISIT (OUTPATIENT)
Dept: SPORTS MEDICINE | Facility: CLINIC | Age: 53
End: 2023-12-28
Payer: COMMERCIAL

## 2023-12-28 ENCOUNTER — TREATMENT (OUTPATIENT)
Dept: PHYSICAL THERAPY | Facility: CLINIC | Age: 53
End: 2023-12-28
Payer: COMMERCIAL

## 2023-12-28 VITALS
BODY MASS INDEX: 39.4 KG/M2 | HEIGHT: 68 IN | WEIGHT: 260 LBS | HEART RATE: 72 BPM | DIASTOLIC BLOOD PRESSURE: 88 MMHG | SYSTOLIC BLOOD PRESSURE: 132 MMHG

## 2023-12-28 DIAGNOSIS — S83.412D SPRAIN OF MEDIAL COLLATERAL LIGAMENT OF LEFT KNEE, SUBSEQUENT ENCOUNTER: ICD-10-CM

## 2023-12-28 DIAGNOSIS — M22.8X9 PATELLAR MALTRACKING, UNSPECIFIED LATERALITY: ICD-10-CM

## 2023-12-28 DIAGNOSIS — S89.92XD LEFT KNEE INJURY, SUBSEQUENT ENCOUNTER: ICD-10-CM

## 2023-12-28 DIAGNOSIS — M25.562 ACUTE PAIN OF LEFT KNEE: ICD-10-CM

## 2023-12-28 DIAGNOSIS — M24.559 HIP FLEXOR TIGHTNESS, UNSPECIFIED LATERALITY: ICD-10-CM

## 2023-12-28 DIAGNOSIS — M17.12 LOCALIZED OSTEOARTHRITIS OF LEFT KNEE: ICD-10-CM

## 2023-12-28 DIAGNOSIS — Q66.6 VALGUS DEFORMITY OF BOTH FEET: ICD-10-CM

## 2023-12-28 DIAGNOSIS — M21.70 LEG LENGTH DISCREPANCY: ICD-10-CM

## 2023-12-28 DIAGNOSIS — S76.312D HAMSTRING STRAIN, LEFT, SUBSEQUENT ENCOUNTER: ICD-10-CM

## 2023-12-28 DIAGNOSIS — M62.9 HAMSTRING TIGHTNESS OF BOTH LOWER EXTREMITIES: ICD-10-CM

## 2023-12-28 DIAGNOSIS — S83.8X2D INJURY OF MENISCUS OF KNEE, LEFT, SUBSEQUENT ENCOUNTER: ICD-10-CM

## 2023-12-28 DIAGNOSIS — S76.112D QUADRICEPS STRAIN, LEFT, SUBSEQUENT ENCOUNTER: ICD-10-CM

## 2023-12-28 DIAGNOSIS — M71.22 SYNOVIAL CYST OF LEFT POPLITEAL SPACE: ICD-10-CM

## 2023-12-28 DIAGNOSIS — M25.362 INSTABILITY OF LEFT KNEE JOINT: ICD-10-CM

## 2023-12-28 PROCEDURE — 97140 MANUAL THERAPY 1/> REGIONS: CPT | Mod: GP

## 2023-12-28 PROCEDURE — 3075F SYST BP GE 130 - 139MM HG: CPT | Performed by: FAMILY MEDICINE

## 2023-12-28 PROCEDURE — 1036F TOBACCO NON-USER: CPT | Performed by: FAMILY MEDICINE

## 2023-12-28 PROCEDURE — 20611 DRAIN/INJ JOINT/BURSA W/US: CPT | Performed by: FAMILY MEDICINE

## 2023-12-28 PROCEDURE — 3044F HG A1C LEVEL LT 7.0%: CPT | Performed by: FAMILY MEDICINE

## 2023-12-28 PROCEDURE — 3079F DIAST BP 80-89 MM HG: CPT | Performed by: FAMILY MEDICINE

## 2023-12-28 PROCEDURE — 99214 OFFICE O/P EST MOD 30 MIN: CPT | Performed by: FAMILY MEDICINE

## 2023-12-28 PROCEDURE — 97110 THERAPEUTIC EXERCISES: CPT | Mod: GP

## 2023-12-28 PROCEDURE — 2500000004 HC RX 250 GENERAL PHARMACY W/ HCPCS (ALT 636 FOR OP/ED): Mod: JZ | Performed by: FAMILY MEDICINE

## 2023-12-28 RX ADMIN — SODIUM HYALURONATE INTRA-ARTICULAR SOLN PREF SYR 25 MG/2.5ML 20 MG: 25/2.5 SOLUTION PREFILLED SYRINGE at 11:00

## 2023-12-28 ASSESSMENT — PATIENT HEALTH QUESTIONNAIRE - PHQ9
2. FEELING DOWN, DEPRESSED OR HOPELESS: NOT AT ALL
1. LITTLE INTEREST OR PLEASURE IN DOING THINGS: NOT AT ALL
SUM OF ALL RESPONSES TO PHQ9 QUESTIONS 1 AND 2: 0

## 2023-12-28 ASSESSMENT — ENCOUNTER SYMPTOMS
OCCASIONAL FEELINGS OF UNSTEADINESS: 0
DEPRESSION: 0
LOSS OF SENSATION IN FEET: 0

## 2023-12-28 ASSESSMENT — PAIN SCALES - GENERAL
PAINLEVEL_OUTOF10: 2
PAINLEVEL_OUTOF10: 2

## 2023-12-28 NOTE — PROGRESS NOTES
"Physical Therapy Treatment    Patient Name: Narciso Pardo  MRN: [id number]  Today's Date: 12/28/2023  Time Calculation  Start Time: 1100  Stop Time: 1140  Time Calculation (min): 40 min     PT Therapeutic Procedures Time Entry  Manual Therapy Time Entry: 10  Therapeutic Exercise Time Entry: 30    Visit Number:  5 (including evaluation)  Planned total visits: 7  Visit Authorized:  7 in 2023 - 3 more in 2024    Current Problem  1. Acute pain of left knee  Follow Up In Physical Therapy      2. Sprain of medial collateral ligament of left knee, subsequent encounter  Follow Up In Physical Therapy      3. Left knee injury, subsequent encounter  Follow Up In Physical Therapy      4. Injury of meniscus of knee, left, subsequent encounter  Follow Up In Physical Therapy    Horizontal tear of the body and posterior horn segments of the  medial meniscus.      5. Hamstring strain, left, subsequent encounter  Follow Up In Physical Therapy      6. Quadriceps strain, left, subsequent encounter  Follow Up In Physical Therapy      7. Patellar maltracking, unspecified laterality  Follow Up In Physical Therapy      8. Hamstring tightness of both lower extremities  Follow Up In Physical Therapy      9. Hip flexor tightness, unspecified laterality  Follow Up In Physical Therapy        Precautions  Precautions  Precautions Comment: none    Pain  Pain Score: 2  Pain Type: Chronic pain  Pain Location: Knee  Pain Orientation: Left    Subjective/General:    The pt returns to the clinic stating that he has been having some soreness at the medial knee over the past couple days, but otherwise is doing well.      Objective  Good eccentric control with heel tap downs     Treatments:  Ther-ex:  - Nustep level 2, 5   - Squat with flathand support x10   - Heel tap downs from 4\" step with flat hand support 2x5 bilat   - Standing hip 4 way with OTB anchored to //bars  2x10 bilat  - Standing hamstring curls with OTB anchored to //bars x20 bilat  - " "Standing marches with OTB looped around feet 2x10 bilat  - Seated LAQ with OTB looped around ankles 2x10 bilat with eccentric focus    Manual:  - STM/MFR through the adductors and medial knee     Assessment:  PT Assessment  Assessment Comment: The pt continues to tolerate increased WBing strength and did well with eccentric focus exercises. Will begin to decrease frequency of sessions in order to begin transitioning toward self-management.    Pain end of session:  1/10    Plan:  OP PT Plan  Treatment/Interventions: Aquatic therapy, Cryotherapy, Dry needling, Education/ Instruction, Electrical stimulation, Gait training, Hot pack, Laser, Manual therapy, Neuromuscular re-education, Taping techniques, Therapeutic activities, Therapeutic exercises, Ultrasound  PT Plan: Skilled PT  PT Frequency:  (1-2x/wk)  Duration: 12wks  Onset Date: 11/28/23  Certification Period Start Date: 11/28/23  Certification Period End Date: 02/26/24  Number of Treatments Authorized: 10 then recheck - allowed 20/yr with 13 used on previous POC  Rehab Potential: Good  Plan of Care Agreement: Patient  Continue with current POC with the following changes decrease frequency to 1x/wk - every other week (depending on pt preference for 5 more sessions.    Assessment of current progress against goals:  Progressing toward functional goals  Goals:  Active       PT Problem       PT STG       Start:  11/28/23    Expected End:  01/12/24       - Pt will complete the HEP with <3 verbal cues for correction  - Pt will demonstrate 2pt improvement on the NPRS, allowing for improved tolerance of functional activities.  - Pt will demonstrate ability to perform 5 step downs from 6\" step with 1 UE support in order to demonstrate improved ability to descend stairs.           PT LTG       Start:  11/28/23    Expected End:  02/26/24       - Pt will be independent in HEP & symptom management  - Pt will demonstrate a 4 pt improvement for knee pain on the NPRS, allowing " "for improved tolerance to perform daily functional activities.   - Pt will demonstrate at least 120deg knee flexion AROM without onset of pain, allowing for a normal pattern with performance of stairs.  - Pt will demonstrate ability to perform 10 sit to stands without UE use in order to demonstrate improved functional LE strength and improved independence with functional mobility  - Pt will demonstrate ability to perform 10 step downs from 8\" step with 1 UE support and without aggravation of symptoms in order to demonstrate improved ability to descend stairs.  - Pt will demonstrate 5/5 MMT grading throughout hip/knee musculature, allowing for appropriate muscle recruitment during daily activities.   - Pt will demonstrate normal mechanics without pain during gait and performance of stairs, allowing for pt to return to navigating the community.  - Pt will perform tandem stance >30\" with EO leading with ea LE in order to demonstrate improved proprioception and stability.  - Pt will demonstrate improved WOMAC score by 11 points (MCID) in order to demonstrate improved functional mobility.            Patient Stated Goal 1       Start:  11/28/23    Expected End:  02/26/24       \"Get rid of pain\"             "

## 2024-01-02 ENCOUNTER — TELEPHONE (OUTPATIENT)
Dept: PHYSICAL THERAPY | Facility: CLINIC | Age: 54
End: 2024-01-02
Payer: COMMERCIAL

## 2024-01-02 NOTE — TELEPHONE ENCOUNTER
Patient is calling for an updated on paperwork for work that was brought with him at his last appointment 12/28/2023. Please call patient to update him when paperwork is completed.       Carol Dahl

## 2024-01-02 NOTE — PATIENT INSTRUCTIONS
PAIN:  A mild to moderate amount of discomfort, tenderness, stiffness, and achiness-caused by the inflammation of the area can be expected for a few days after the injection. This is normal and good as the inflammation is an important part of the healing process.    SKIN: Due to the numbing spray used, you may develop a red, itchy, scaly rash in the area that was sprayed. Should your skin become itchy, wash the area with mild soap and warm water. If needed, you may apply topical over-the-counter Hydrocortisone cream to alleviate any itchiness. AVOID scratching due to risk of infection.,     BATHING/SWIMMING: You may shower after your procedure with regular soap and water, but no baths, no swimming, and no hot tubs for 3-4 days after the procedure.,     ACTIVITIES:  Immediately following the injection, you may resume daily activities (such as work) and light exercise. We suggest that you refrain from strenuous activity and heavy lifting, particularly the injured body part, for a week post-procedure, but sometimes this can change as well.    MOIST HEAT/ICE:  Moist heat may be used on the injection area. NO ICE.  MEDICATION: You may continue your prescribed medications and any over-the-counter supplements; however, it is not recommended to use aspirin or anti-inflammatories (Motrin, Advil, Aleve, Ibuprofen, Naproxen etc.) for up to 2 weeks post procedure. Tylenol Extra Strength, Tylenol Arthritis and/or any prescribed narcotics or muscle relaxants are OK to take.    APPOINTMENTS: You should have a follow-up appointment with Dr. Heart scheduled 1-3 weeks as recommended after your procedure for your next round of injections or to follow-up after you have completed your injection series. Please schedule your follow-up appointment on your way out after your procedure, or call the office to schedule these appointments as soon as possible.    PRECAUTIONS: Smoking, caffeine, alcohol, sex and anti-inflammatories  post-procedure may reduce the effectiveness of Prolotherapy/Neural Prolotherapy/Prolozone injections. Early, rare post procedure problems that can be concerning are as follows: an increase in pain not relieved by medication (over the counter or prescription), a temperature above 101 degrees, bleeding or progressive, extreme swelling, or numbness.     CALL THE OFFICE OR GO TO THE EMERGENCY ROOM IF ANY OF THESE SYMPTOMS OCCUR.   If you have any questions or problems, please call our office at [phone]     Treatment or Intervention:  Continue to alternate ice and moist heat as needed    Continue  Physical Therapy 1-2 times a week for 8-10 weeks with manual therapy as well as dry needling and IASTM especially building up VMO strength   Once again, stressed the importance of wearing shoes with good stability control to help with the biomechanics affecting the knees as well as the lower extremities    Once again, stressed the importance of wearing full foot insoles to help with the biomechanics affecting the knees as well as the lower extremities, Recommendation over-the-counter calcium with vitamin-D 2 -3000+ milligrams a day, as well as OTC symphytum as directed daily to promote bony healing, in addition to a daily multivitamin.    Once again, recommendation over-the-counter Move Free for joint health.    Once again, may take the following: Ibuprofen may alternate with OTC Tylenol Extra Strength or OTC Tylenol Arthritis, taking one every 6-8 hours with food as needed.    Patient advised regarding the risks and/or potential adverse reactions and/or side effects of any prescribed medications along with any over-the-counter medications or any supplements used. Patient advised to seek immediate medical care if any adverse reactions occur. The patient and/or patient(s) parent(s) verbalized their understanding,   Once again, discussed with patient to possibility of surgical intervention in the future to repair meniscal  injury.   Performed injection of Supartz #4 into patient's LEFT knee under ultrasound. The patient tolerated the procedure well and without any adverse effects. Discharge instructions for regenerative/viscosupplementation/corticosteroid injections were reviewed in detail with the patient to the level of their understanding; a copy of these instructions were provided to the patient at the time of this office visit.       Follow-up in 1 week for Supartz #5 of the patients LEFT knee or sooner as needed.

## 2024-01-03 NOTE — TELEPHONE ENCOUNTER
Updated [facility] paperwork completed and faxed as directed to [facility] [employer] [employer] [facility] at [phone], return to work date listed as 01/14/24; patient aware, copy of paperwork provided to the patient.

## 2024-01-04 ENCOUNTER — OFFICE VISIT (OUTPATIENT)
Dept: SPORTS MEDICINE | Facility: CLINIC | Age: 54
End: 2024-01-04
Payer: COMMERCIAL

## 2024-01-04 VITALS
BODY MASS INDEX: 39.4 KG/M2 | SYSTOLIC BLOOD PRESSURE: 118 MMHG | WEIGHT: 260 LBS | HEART RATE: 76 BPM | HEIGHT: 68 IN | DIASTOLIC BLOOD PRESSURE: 82 MMHG

## 2024-01-04 DIAGNOSIS — Q66.6 VALGUS DEFORMITY OF BOTH FEET: ICD-10-CM

## 2024-01-04 DIAGNOSIS — S83.412D SPRAIN OF MEDIAL COLLATERAL LIGAMENT OF LEFT KNEE, SUBSEQUENT ENCOUNTER: ICD-10-CM

## 2024-01-04 DIAGNOSIS — M24.559 HIP FLEXOR TIGHTNESS, UNSPECIFIED LATERALITY: ICD-10-CM

## 2024-01-04 DIAGNOSIS — S76.112D QUADRICEPS STRAIN, LEFT, SUBSEQUENT ENCOUNTER: ICD-10-CM

## 2024-01-04 DIAGNOSIS — M25.562 ACUTE PAIN OF LEFT KNEE: ICD-10-CM

## 2024-01-04 DIAGNOSIS — S89.92XD LEFT KNEE INJURY, SUBSEQUENT ENCOUNTER: ICD-10-CM

## 2024-01-04 DIAGNOSIS — M21.70 LEG LENGTH DISCREPANCY: ICD-10-CM

## 2024-01-04 DIAGNOSIS — M25.362 INSTABILITY OF LEFT KNEE JOINT: ICD-10-CM

## 2024-01-04 DIAGNOSIS — S83.8X2D INJURY OF MENISCUS OF KNEE, LEFT, SUBSEQUENT ENCOUNTER: ICD-10-CM

## 2024-01-04 DIAGNOSIS — S76.312D HAMSTRING STRAIN, LEFT, SUBSEQUENT ENCOUNTER: ICD-10-CM

## 2024-01-04 DIAGNOSIS — M62.9 HAMSTRING TIGHTNESS OF BOTH LOWER EXTREMITIES: ICD-10-CM

## 2024-01-04 DIAGNOSIS — M71.22 SYNOVIAL CYST OF LEFT POPLITEAL SPACE: ICD-10-CM

## 2024-01-04 DIAGNOSIS — M17.12 LOCALIZED OSTEOARTHRITIS OF LEFT KNEE: ICD-10-CM

## 2024-01-04 DIAGNOSIS — M22.8X9 PATELLAR MALTRACKING, UNSPECIFIED LATERALITY: ICD-10-CM

## 2024-01-04 PROCEDURE — 3074F SYST BP LT 130 MM HG: CPT | Performed by: FAMILY MEDICINE

## 2024-01-04 PROCEDURE — 99214 OFFICE O/P EST MOD 30 MIN: CPT | Performed by: FAMILY MEDICINE

## 2024-01-04 PROCEDURE — 1036F TOBACCO NON-USER: CPT | Performed by: FAMILY MEDICINE

## 2024-01-04 PROCEDURE — 2500000004 HC RX 250 GENERAL PHARMACY W/ HCPCS (ALT 636 FOR OP/ED): Mod: JZ | Performed by: FAMILY MEDICINE

## 2024-01-04 PROCEDURE — 3079F DIAST BP 80-89 MM HG: CPT | Performed by: FAMILY MEDICINE

## 2024-01-04 PROCEDURE — 20611 DRAIN/INJ JOINT/BURSA W/US: CPT | Performed by: FAMILY MEDICINE

## 2024-01-04 RX ADMIN — SODIUM HYALURONATE INTRA-ARTICULAR SOLN PREF SYR 25 MG/2.5ML 20 MG: 25/2.5 SOLUTION PREFILLED SYRINGE at 11:15

## 2024-01-04 ASSESSMENT — LIFESTYLE VARIABLES
HOW OFTEN DURING THE LAST YEAR HAVE YOU FOUND THAT YOU WERE NOT ABLE TO STOP DRINKING ONCE YOU HAD STARTED: NEVER
HOW OFTEN DO YOU HAVE A DRINK CONTAINING ALCOHOL: 2-3 TIMES A WEEK
AUDIT TOTAL SCORE: 4
HOW OFTEN DURING THE LAST YEAR HAVE YOU NEEDED AN ALCOHOLIC DRINK FIRST THING IN THE MORNING TO GET YOURSELF GOING AFTER A NIGHT OF HEAVY DRINKING: NEVER
HOW OFTEN DURING THE LAST YEAR HAVE YOU HAD A FEELING OF GUILT OR REMORSE AFTER DRINKING: NEVER
HAS A RELATIVE, FRIEND, DOCTOR, OR ANOTHER HEALTH PROFESSIONAL EXPRESSED CONCERN ABOUT YOUR DRINKING OR SUGGESTED YOU CUT DOWN: NO
HOW MANY STANDARD DRINKS CONTAINING ALCOHOL DO YOU HAVE ON A TYPICAL DAY: 1 OR 2
HOW OFTEN DURING THE LAST YEAR HAVE YOU BEEN UNABLE TO REMEMBER WHAT HAPPENED THE NIGHT BEFORE BECAUSE YOU HAD BEEN DRINKING: NEVER
HAVE YOU OR SOMEONE ELSE BEEN INJURED AS A RESULT OF YOUR DRINKING: NO
SKIP TO QUESTIONS 9-10: 0
HOW OFTEN DURING THE LAST YEAR HAVE YOU FAILED TO DO WHAT WAS NORMALLY EXPECTED FROM YOU BECAUSE OF DRINKING: NEVER
HOW OFTEN DO YOU HAVE SIX OR MORE DRINKS ON ONE OCCASION: LESS THAN MONTHLY
AUDIT-C TOTAL SCORE: 4

## 2024-01-04 ASSESSMENT — ENCOUNTER SYMPTOMS
LOSS OF SENSATION IN FEET: 0
OCCASIONAL FEELINGS OF UNSTEADINESS: 0
DEPRESSION: 0

## 2024-01-04 ASSESSMENT — PATIENT HEALTH QUESTIONNAIRE - PHQ9
1. LITTLE INTEREST OR PLEASURE IN DOING THINGS: NOT AT ALL
SUM OF ALL RESPONSES TO PHQ9 QUESTIONS 1 AND 2: 0
2. FEELING DOWN, DEPRESSED OR HOPELESS: NOT AT ALL

## 2024-01-04 ASSESSMENT — PAIN SCALES - GENERAL: PAINLEVEL_OUTOF10: 1

## 2024-01-04 ASSESSMENT — PAIN DESCRIPTION - DESCRIPTORS: DESCRIPTORS: ACHING

## 2024-01-04 ASSESSMENT — PAIN - FUNCTIONAL ASSESSMENT: PAIN_FUNCTIONAL_ASSESSMENT: 0-10

## 2024-01-10 NOTE — PROGRESS NOTES
"Verbal consent of the patient and/or verbal parental consent for patients under the age of 18 have been obtained to conduct a physical examination at this office visit.    Established patient  History Of Present Illness  01/17/24 Narciso Pardo is a 54 y.o. male who presents for Supartz Injection 5/5 injection into the patient's Left Knee. He states that he continues to have some pain to the medial aspect of his LEFT knee which has been worse in the subzero temperatures. Despite some increased soreness and aching to the knees with the weather he feels his overall pain has improved from before staring the viscosupplementation. Narciso does state that he wears a compression sleeve to the area which helps to keep the knee warm and less stiff. He continues to participate in physical therapy as well as perform his home exercises as directed. He wears full foot insoles in his shoes and good supportive footwear. The patient states he has not needed to treat his pain recently. Narciso reports that he is happy with the improvement to his knee pain since starting the injections and feels it will help him to avoid surgical intervention at the present.    He says overall he is very happy with the results of the injections especially if he can keep him off of getting knee replacements done.  He says his knees felt very well while he was in [place] and he did lots of hiking at that time.    Last Recorded Vitals  /86   Pulse 85   Ht 1.727 m (5' 8\")   Wt 118 kg (260 lb)   BMI 39.53 kg/m²      All previous Progress Notes and imaging results related to this patients chief complaint have been reviewed in preparation for this examination.    Past Medical History  He has a past medical history of Fatty (change of) liver, not elsewhere classified, Other conditions influencing health status, Other obesity (05/11/2017), Other obesity (03/30/2018), Personal history of other diseases of the musculoskeletal system and connective " tissue, Personal history of other diseases of the musculoskeletal system and connective tissue, Personal history of other mental and behavioral disorders, and Type 2 diabetes mellitus without complications (CMS/[facility]) (02/03/2017).    Surgical History  He has a past surgical history that includes Shoulder surgery (Left); Wrist surgery (Left); and Carpal tunnel release (Right).     Social History  He reports that he has never smoked. He has never used smokeless tobacco. He reports current alcohol use. He reports that he does not use drugs.    Family History  Family History   Problem Relation Name Age of Onset    Diabetes Mother      Diabetes Father          Allergies  Patient has no known allergies.    Historical Clinical Intake     May 10, 2023--- EAE --- Verbal consent of the patient and/or verbal parental consent for patients under the age of 18 have been obtained to conduct a physical examination at this office visit. Newly established patient. Pt is a 53yr old male, who works as a . He has been graciously referred to this office by Dr. Villalobos for LEFT knee pain that has been bothering him for a month. No known trauma or injury. 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 By Monday, he noticed pain that started in the back of his knee and then moved to the inside. Pain with flexion, extension and squatting. Pt notes that his pain has caused him to start walking with a limp. He purchased a compression knee sleeve as well as alternating between Aleve and Ibuprofen with no relief in pain prior to seeing his primary care provider. Upon suggestion, patient has recently purchased a hinged knee brace and has been taking Prednisone as prescribed by his primary care provider. He believes that both have currently helped provide some relief in his pain. Rates current pain as a 5/10 describing it as a constant ache, though prior to the new brace and  Prednisone, his pain was a 7/10. Denies any numbness or tingling. No previous history of injury.  ---------------------------------------  June 29, 2023 Above note reviewed. Verbal consent of the patient and/or verbal parental consent for patients under the age of 18 have been obtained to conduct a physical examination at this office visit. Narciso returns today for his follow up LEFT knee. Serg presents today with increased pain to LEFT medial knee and additionally LEFT lumbar pain. His gait is ataxic, although he isn't using any assistance devices. He rates his LEFT knee pain 6/10 described as aching and his Left LUMBAR pain as 8/10 described as burning and stabbing. He has not started physical therapy yet, he was out of town and he plans on starting soon. He is wearing a copper knee brace for additional support and he does take the Move free supplement. He adds that he saw Dr Villalobos for his Lumbar pain and he did some xrays and put him on Prednisone, which did not help. Serg denies and tingling or numbness. He takes ibuprofen for pain and he applies ice for some relief. We discussed treatment options. Patient continues to show indications of a knee sprain for which I recommended that he wear a hinged metal brace. Patient was using an over-the-counter hinged middle brace but states that it did not of the any durability and fell apart. As such we will fit patient for playmaker brace for increased ability under ability for his knee and he will start physical therapy as soon as possible. Patient also complaining of low back pain which may be exacerbated by his knee sprain after exam there indications of a low back strain we will amend his physical therapy order to include as lower back and can re-evaluate at his follow-up appointment. Patient verbalizes understanding and agreement with plan of care.  ---------------------------------------  July 5, 2023. HW  Verbal consent of the patient and/or verbal parental  consent for patients under the age of 18 have been obtained to conduct a physical examination at this office visit. This 53 year old male presents with left sided low back pain. He presents today with a limp. He states he was here last Thursday for an appointment for his left knee. He mentioned at the appointment that his low back was bothering him. He states a couple weeks ago he went to [place] to [facility] and when he came home, his low back pain started. He was given a Toradol and Solumedrol injection and he was feeling better Friday morning when he woke up. He states Friday morning he fell down 6 steps on his buttocks, mostly on his left side. He states he also hit his left knee when he fell, but he is no longer having pain in his knee. He states he has pain all the time, with no relief. He has been taking Cyclobenzaprine and Diclofenac with no relief. He hasn't been able to sleep well due to his pain. He states he has more pain with back extension than back flexion. He denies any radiculitis. He was unable to go to work this weekend. 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 He iced, heated, took medications and tried stretching, but he is still in pain. He rates his pain at 8/10 and describes it as sharp. He is supposed to work the next 3 nights, but doesn't think is able to.  ---------------------------------------  August 2, 2023 Above notes reviewed. Verbal consent of the patient and/or verbal parental consent for patients under the age of 18 have been obtained to conduct a physical examination at this office visit. Narciso returns today for his follow up of his LOW BACK. He states that he is feeling slightly improved since his previous visit. Rates current pain as a 3.5/10 describing it as a stiffness. Pt has remained out of work since last seen in office. He has completed seven sessions of physical therapy since last visit and feels that it has been helping with his pain. We discussed  "treatment options. Patient continues to have point tenderness over the lower lumbar spine as well as significant restriction especially with extension lateral flexion and rotation. Patient continues to complain of pain although he states that is improved. Patient has only completed 3 weeks of physical therapy and as such we will continue with current treatment options for another 3-4 weeks and re-evaluate at that time. If patient continues to have pain tenderness and restriction we will consider an MRI at that time. Patient verbalizes understanding and agreement with plan of care.  ---------------------------------------  August 30, 2023 Above notes reviewed. Verbal consent of the patient and/or verbal parental consent for patients under the age of 18 have been obtained to conduct a physical examination at this office visit. Narciso returns today for his follow up of his low back. Pt states that he is feeling better since his previous visit. He feels that his back is back to normal but his knee pain comes and goes. Rates current back pain as a 2/10 describing it as stiff and his knee pain as a 3/10 describing it as a \"nagging\" pain. He continues with physical therapy with about six sessions left, noting that he feels it is helping his back more than his knee. Pt feels that he can return back to work and will bring in paperwork to be completed to document such. We discussed treatment options. Patient has been doing well with his physical therapy and states that he is feeling largely back to normal. Upon exam patient does continue to have point tenderness over the lumbar spine specifically the L3 vertebrae and associated paraspinal muscles. As such we will order an MRI to better evaluate for any underlying pathology. Patient can return to work without restrictions and we can reevaluate after his MRI or as symptoms change. Patient verbalizes understanding and agreement with plan of " care.  ---------------------------------------  September 26, 2023 Above notes reviewed. Verbal consent of the patient and/or verbal parental consent for patients under the age of 18 have been obtained to conduct a physical examination at this office visit. Previously established patient. Narciso returns today for his MRI follow up of his LUMBAR spine. States that his back is feeling somewhat better rating pain as a 2/10 describing it as stiffness. His knee however is worse, rating it as a 6/10. Pt has started and is continuing with physical therapy for his knee. He has been wearing the brace and notes that it is supportive but not making it feel better. We reviewed patient MRI results in detail. Patient verbalizes understanding of results. We discussed treatment options and will refer patient to Dr. Sosa for surgical consult and evaluation as recommended by radiology. Patient can follow-up after surgical consult for reevaluation of the knee and we can discuss treatment options at that time such as physical therapy or more advanced imaging. Patient verbalizes understanding and agreement with plan of care.  ---------------------------------------  11/2/2023---Narciso Pardo is a 53 y.o. male presenting with re-injury of his LEFT knee. Unknown trauma or injury. He works at [employer] and is on his feet for twelve hours a day. Pain has worsened over the past two weeks to the point that it is making it difficult to walk. Pain also is disrupting sleep at night. He states that his pain primarily in the medial aspect of his knee. Pain with all range of motion. He does continue to wear the brace he was previously fitted for in this office and notes that while it does support his knee, it does nothing for his pain. Rates current pain as a 8/10 describing it as a constant throbbing pain with sitting and a shocking pain with movement. Pt continues to Move Free and NSAIDs for joint pain over the past month with no  improvement in pain. Pt did physical therapy last time he was seen and notes that it did not make any improvements with his pain in his knee which was last done in early September.  ---------------------------------------   11/13/23 Narciso Pardo is a 53 y.o. male who presents to review his LEFT knee MRI. He completed Physical Therapy in September 2023. He continues to perform his home exercise program previously provided to him by Physical Therapy. He describes his pain as sharp and burning along the medial aspect of his left knee. He notes pain exacerbates when transitioning from sitting to standing, with ambulation and throughout the evening. He states stretching and stairs in both directions also increase pain. He cites pain disturbs his sleep. He walks with a myopathic gait. Narciso works 12 hour swing shifts at [employer] on a [employer] E[employer] and states he has been unable to work a full shift since returning to work in September. He tells that he works on concrete floor and consistently goes up and down on ladders. He rates his pain at 7/10 today, but states pain reaches 10/10 daily. He states that his pain is constant. He continues to take previously prescribed Prednisone and takes OTC Ibuprofen, applies OTC Voltaren gel and ice with no relief. He has also worn previously supplied Playmaker knee brace but says his pain increased with use so he stopped wearing it. 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  He would like a cortisone shot if possible.   ---------------------------------------  11/28/23 Narciso Pardo is a 53 y.o. male who presents for Supartz Injection 1/5 injection into the patients LEFT knee. He states that he is feeling better s/p cortisone injection and pain has improved significantly. He continues to c/o dull achy pain along the medial joint line of his LEFT knee. He has started into physical  therapy and is performing his home exercise program as previously instructed. He denies any instability, locking, catching, numbness/tingling, or swelling at this time. He is taking all supplements as previously directed and is wearing shoes with good support as well as OTC insoles as previously recommended.  He rates pain at 2/10 today in his LEFT knee.   ---------------------------------------  12/12/23 Narciso Pardo is a 53 y.o. male who presents for Supartz Injection 2/5 injection into the patients LEFT knee. He denies any pain currently, but also notes that he is not bowling or doing a lot of activity currently. He is in physical therapy and is also performing his home exercise program as previously directed. He denies any swelling, locking, catching, or instability in his LEFT knee at this time.  --------------------------------------------------------------------  12/28/23 Narciso Pardo is a 53 y.o. male who presents for Supartz Injection 3/5 injection into the patients Left Knee. He continues to perform his home exercise program previously directed by Physical Therapy. He describes his pain as an ache diffusely throughout his left knee. He notes pain is greatest in the morning or with the first motions while driving. He rates his pain at 2/10 today. He is not utilizing any pain management interventions at this time.  He said he is very happy with the results of the injections so far is definitely noticed a difference not only with his range of motion but also his strength and not having pain like he did previously.   -----------------------------------------------------------------  01/04/24 Narciso Gonzaleslla is a 53 y.o. male who presents for Supartz Injection 4/5 injection into his Left knee. He notes mild improvement since beginning viscosupplementation injections and performing his home exercise program. He reports generalized stiffness upon waking in the morning as well as while driving for  extended periods of time that works itself out with activity/movement. He rates his pain at 1/10 today. He uses ice for pain management with mild, temporary relief. He wears a sleeve type brace with activity and has a custom brace previously supplied. He continues to be very happy with the results of the injections not only with his range of motion but also pain.  He says he will know for sure how well he is doing next week when he leaves for [place] because he has lots of stuff lined up for walking and hiking.    Review of Systems:  CONSTITUTIONAL:   Negative for weight change, loss of appetite, fatigue, weakness, fever, chills, night sweats, headaches .           HEENT:   Negative for cold, cough, sore throat, sinus pain, swollen lymph nodes.           OPHTHALMOLOGY:   Negative for diminished vision, blurred vision, loss of vision, double vision.           ALLERGY:   Negative for runny nose, scratchy throat, sinus congestion, rash, facial pressure, nasal congestion, post-nasal drip.           CARDIOLOGY:   Negative for chest pain, palpitations, murmurs, irregular heart beat, shortness of breath, leg edema, dyspnea on exertion, fatigue, dizziness.           RESPIRATORY:   Negative for chest pain, shortness of breath, swelling of the legs, asthma/copd, chest congestion, pain with breathing .           GASTROENTEROLOGY:   Negative for nausea, vomitting, heartburn, constipation, diarrhea, blood in stool, change in bowel habits, black stool.           HEMATOLOGY/LYMPH:   Negative for fatigue, loss of appetitie, easy bruising, easy bleeding, anemia, abnormal bleeding, slow healing.           ENDOCRINOLOGY:   Negative for polyuria, polydipsia, polyphagia, fatigue, weight loss, weight gain, cold intolerance, heat intolerance, diabetes.           MUSCULOSKELETAL:   Positive  for LEFT Knee pain        DERMATOLOGY:   Negative for rash, bruising.           NEUROLOGY:   Negative for tingling, numbness, gait abnormality,  paresthesias, weakness, sciatica.         General Examination:  GENERAL APPEARANCE: Appears well, pleasant, and cooperative, NAD.   HEENT: Normal, unremarkable.   NECK: Supple.   HEART: RRR, normal S1S2.   LUNGS: Clear to auscultation bilaterally.   ABDOMEN: Soft, NT/ND, BS present.   EXTREMITIES: No cyanosis, clubbing.   SKIN: No rash or cellulitis.   NEUROLOGIC EXAM: Awake, A&O x 3, CN's II-XII grossly intact.   PSYCH: Good eye contact, appropriate mood and affect     The Scribe, Aracelis, was present in the room during the entire visit including, but not limited to the physical examination!.   Examination: Overall improvement   Since receiving all the injections  Left  Knee  Edema Negative  Effusion: Negative.   Percussion Test: Negative  Tuning Fork Test: Negative  Ecchymosis/Bruising: Negative.            Palpation: Overall improvement   Since receiving all the injections  Left  Knee  Positive Tender to palpation body and posterior joint line medial meniscus LEFT       Orientation: Symmetrical           Range of Motion: Overall improvement   Since receiving all the injections  Positive Knee Flexion ( 0-135 degrees) Overall improvement   Since receiving all the injections  Left  Knee  Positive Knee Extension ( 0-15 degrees) Overall improvement   Since receiving all the injections  Left  Knee     Muscle Strength: Overall improvement   Since receiving all the injections  Left  Knee  Positive +4-+5/+5 Quadricep Extension Causes pain  Positive +4-+5/+5  Hamstring Flexion Causes pain          +5+5 Gastrocnemius  +5+5 Soleus.            Proprioception:  Overall improvement   Since receiving all the injections  Left Knee  Pain with Squat: Positive    Pain with Sumo Squat:  Positive    Hop Test: Positive    Single leg balance test Positive            Vascular:   +2/+4 Femoral  +2/+4 Dorsalis Pedis  +2/+4 Posterior Tibial  Capillary Refill less than 2 seconds.     Diagnostic studies:  MRI of the left knee without contrast       INDICATION:  Signs/Symptoms:LEFT KNEE PAIN  COMPARISON: None.      ACCESSION NUMBER(S): [id number]    ORDERING CLINICIAN: JODIE TORRE      TECHNIQUE:  Multiplanar multisequence MRI of the left knee was performed without  intravenous contrast.      FINDINGS:  Menisci:  Lateral meniscus is intact. Horizontal tear of the body and  posterior horn segments of the medial meniscus with fluid signal  reaching the inferior surface..      Cruciate ligaments: Intact.      Collateral ligaments: Intact.      Tendons:  Intact.      Muscles: Intact.      Bones:  No evidence of acute fracture. Bone marrow signal intensity  is within normal limits. Sequela of chronic Osgood-Schlatter with  corticated prominence of the tibial tubercle.      Articular cartilage:  Lateral compartment: Intact.  Medial compartment: Mild thinning of the posterior weight-bearing  medial femoral condyle involving a small area of proximally about 14  mm AP. Medial tibial plateau cartilage is intact. Patellofemoral  compartment: Focal near full-thickness chondral loss of superior  aspect of the medial trochlear groove with surrounding areas of  delamination.      Miscellaneous: A small knee joint effusion is noted. A small Baker's  cyst is noted..      IMPRESSION:  1. Horizontal tear of the body and posterior horn segments of the  medial meniscus.  2. Mild medial and patellofemoral compartment degenerative changes  with chondral loss as described above.  3. Small Bakers cyst   4. Small joint effusion  5. Chronic Osgood Schlatter disease     ------------------------------------------------------------------------  PROCEDURE:  KNEE LT MIN 4 VIEW - TXR  [id number]  REASON FOR EXAM: ACUTE PAIN OF LEFT KNEE  RESULT: MRN: [id number]     Patient Name: FARZANA RUSH   STUDY: KNEE LT MIN 4 VIEW 5/10/2023 8:35 am  INDICATION: ACUTE PAIN OF LEFT KNEE 53-year-old man with left knee pain for 1 month, medially. No known injury  COMPARISON: Left tibial radiograph  02/03/2017     ACCESSION NUMBER(S): [id number]   ORDERING CLINICIAN: JODIE TORRE     TECHNIQUE: Four views of the left knee were performed.     FINDINGS:  No cortical irregularity or lucency is identified to suggest acute fracture or dislocation of the left knee. There is mild tricompartmental joint space narrowing with small osteophyte formation. No significant suprapatellar knee joint effusion. Prominent enthesophyte at the tibial tubercle. Small enthesophytes superiorly and inferiorly at the patella.     IMPRESSION:  Degenerative changes of the left knee, as detailed above. No sign of acute fracture or significant suprapatellar knee joint effusion. If symptoms persist, consider MRI for further evaluation.     Dictation workstation:   [id number]   Original Interpreting Physician:   FRANCESCA HOWELL MD  Original Transcribed by/Date: MMODAL May 10 2023  8:13A        Knee - ACL:Overall improvement   Since receiving all the injections  Anterior Drawer Test: Positive    Lachman Test: Positive       Lelli Test:  Positive       KNEE - MCL / LCL:Overall improvement   Since receiving all the injections  Valgus Stress Test:  90 degrees: Negative, 20-30 degrees: Negative.   Varus Stress Test:  90 degrees: Positive  20-30 degrees: Positive   Apley Distraction Test: Positive Lateral.   Thessaly Test: Positive Anteior Lateral Meniscus, Posterior Lateral Meniscus, Origins and Insertion LCL, Distal Hamstring.         KNEE - IT BAND:  Cayla Test: Negative.   Noble Test: Negative.          KNEE - MENISCUS: Overall improvement   Since receiving all the injections  Apley Compression Test: Positive  Lateral joint line.   Stienmann Test:  Positive    Terrance Test: Positive  Lateral joint line.   Bounce Home Test:  Positive   Thessaly Test:  Positive Anterior Lateral Joint Line Pain and Posterior Lateral Joint Line Pain.            KNEE - PATELLA: Overall improvement   Since receiving all the injections  Left  Knee  Apprehension  Test:  Positive   Glide Test:  Positive   Grind Test: Positive   Patella Tracking Test: Positive               KNEE - QUADRICEPS:  Overall improvement   Since receiving all the injections  Left  Knee  VMO Test: Positive    VLO Test:  Negative.              Feet/Foot: Positive  BILATERAL  Valgus foot      Assessment   1. Localized osteoarthritis of left knee  Point of Care Ultrasound      2. Injury of meniscus of knee, left, subsequent encounter        3. Synovial cyst of left popliteal space        4. Acute pain of left knee        5. Sprain of medial collateral ligament of left knee, subsequent encounter        6. Left knee injury, subsequent encounter        7. Patellar maltracking, unspecified laterality        8. Hamstring tightness of both lower extremities        9. Hip flexor tightness, unspecified laterality        10. Instability of left knee joint        11. Hamstring strain, left, subsequent encounter        12. Quadriceps strain, left, subsequent encounter        13. Leg length discrepancy        14. Valgus deformity of both feet            Procedure   Time Out (5 Minutes): Yes  Patient Name: Narciso Pardo  YOB: 1970  Procedure: Supartz Injection 5/5  Needed Supplies Available: Correct Supplies  Laterality: Left Knee  Site Verified and Marked: Correct Site(s) Marked  Timeout Performed by Provider: Dr. Douglas Heart D.O.  Staff Initials: SLN     L Inj/Asp: L knee on 1/17/2024 11:43 AM  Indications: pain  Details: 22 G needle, ultrasound-guided  Medications: 20 mg sodium hyaluronate 10 mg/mL  Outcome: tolerated well, no immediate complications  Procedure, treatment alternatives, risks and benefits explained, specific risks discussed. Consent was given by the patient. Immediately prior to procedure a time out was called to verify the correct patient, procedure, equipment, support staff and site/side marked as required.            Patient is informed and consent has been signed by the  patient if over 18 years old., Patient parent and/or legal guardian is informed and consent has been signed., Patient and/or parent and/or legal guardian accept all risks, benefits, and possible complications associated with procedure(s) and/or manipulation(s) and/or injection(s)., Patient and/or parent and/or legal guardian deny patient allergy or known complications with any of the medications, instruments, products, and/or techniques used., All questions and concerns were answered and/or addressed with the patient and/or parent and/or legal guardian., The patient and/or parent and/or legal guardian agree to proceed with the procedure(s) and/or manipulation(s) and/or injection(s)., Prep: The area was cleansed with a mixture of equal parts rubbing alcohol 70% and Hibclens., Utilizing clean technique, the injection site(s) were marked with a skin marker., and Injection site(s) were anesthestized with topical analgesic spray prior to injection.    Performed regenerative Supartz Injection 5/5 into the patients Left Knee, under ultrasound.  Supartz FX (25mg/2.5ml)     Band-aid and/or compressive bandage was placed over the injection site(s). After the injection(s) performed osteopathic manipulation therapy to the affected area(s) to help increase blood flow to aid with the healing process as well as circulation of the medication. The patient tolerated the procedure well without any complications. The patient was instructed to gently massage the treatment site(s) as well as to apply moist heat to the affected area(s). Additionally, the patient was instructed to contact the office immediately with any complications or concerns.    The NurseChar was present in the room during the entire procedure.    The following discharge instructions were reviewed in detail with the patient to the level of their understanding:    PAIN:  A mild to moderate amount of discomfort, tenderness, stiffness, and achiness-caused by the  inflammation of the area can be expected for a few days after the injection. This is normal and good as the inflammation is an important part of the healing process.    SKIN: Due to the numbing spray used, you may develop a red, itchy, scaly rash in the area that was sprayed. Should your skin become itchy, wash the area with mild soap and warm water. If needed, you may apply topical over-the-counter Hydrocortisone cream to alleviate any itchiness. AVOID scratching due to risk of infection.,     BATHING/SWIMMING: You may shower after your procedure with regular soap and water, but no baths, no swimming, and no hot tubs for 3-4 days after the procedure.,     ACTIVITIES:  Immediately following the injection, you may resume daily activities (such as work) and light exercise. We suggest that you refrain from strenuous activity and heavy lifting, particularly the injured body part, for a week post-procedure, but sometimes this can change as well.    MOIST HEAT/ICE:  Moist heat may be used on the injection area. NO ICE.  MEDICATION: You may continue your prescribed medications and any over-the-counter supplements; however, it is not recommended to use aspirin or anti-inflammatories (Motrin, Advil, Aleve, Ibuprofen, Naproxen etc.) for up to 2 weeks post procedure. Tylenol Extra Strength, Tylenol Arthritis and/or any prescribed narcotics or muscle relaxants are OK to take.    APPOINTMENTS: You should have a follow-up appointment with Dr. Heart scheduled 1-3 weeks as recommended after your procedure for your next round of injections or to follow-up after you have completed your injection series. Please schedule your follow-up appointment on your way out after your procedure, or call the office to schedule these appointments as soon as possible.    PRECAUTIONS: Smoking, caffeine, alcohol, sex and anti-inflammatories post-procedure may reduce the effectiveness of Prolotherapy/Neural Prolotherapy/Prolozone injections. Early,  rare post procedure problems that can be concerning are as follows: an increase in pain not relieved by medication (over the counter or prescription), a temperature above 101 degrees, bleeding or progressive, extreme swelling, or numbness.     CALL THE OFFICE OR GO TO THE EMERGENCY ROOM IF ANY OF THESE SYMPTOMS OCCUR.   If you have any questions or problems, please call our office at [phone]     Treatment or Intervention:   Continue to alternate ice and moist heat as needed    Continue  Physical Therapy 1-2 times a week for 8-10 weeks with manual therapy as well as dry needling and IASTM especially building up VMO strength   Once again, stressed the importance of wearing shoes with good stability control to help with the biomechanics affecting the knees as well as the lower extremities    Once again, stressed the importance of wearing full foot insoles to help with the biomechanics affecting the knees as well as the lower extremities, Recommendation over-the-counter calcium with vitamin-D 2 -3000+ milligrams a day, as well as OTC symphytum as directed daily to promote bony healing, in addition to a daily multivitamin.    Once again, recommendation over-the-counter Move Free for joint health.    Once again, may take the following: Ibuprofen may alternate with OTC Tylenol Extra Strength or OTC Tylenol Arthritis, taking one every 6-8 hours with food as needed.    Patient advised regarding the risks and/or potential adverse reactions and/or side effects of any prescribed medications along with any over-the-counter medications or any supplements used. Patient advised to seek immediate medical care if any adverse reactions occur. The patient and/or patient(s) parent(s) verbalized their understanding,   Once again, discussed with patient to possibility of surgical intervention in the future to repair meniscal injury.   Performed injection of Supartz #5 into patient's LEFT knee under ultrasound. The patient tolerated  the procedure well and without any adverse effects. Discharge instructions for regenerative/viscosupplementation/corticosteroid injections were reviewed in detail with the patient to the level of their understanding; a copy of these instructions were provided to the patient at the time of this office visit.   Follow-up in 3 months for reevaluation of the patient's LEFT knee or sooner as needed.       ROBER SOLOMON on 1/17/24 at 1:11 PM.     Rober SARABIA. Una LALA, FAOASM

## 2024-01-12 ENCOUNTER — APPOINTMENT (OUTPATIENT)
Dept: SPORTS MEDICINE | Facility: CLINIC | Age: 54
End: 2024-01-12
Payer: COMMERCIAL

## 2024-01-15 ENCOUNTER — APPOINTMENT (OUTPATIENT)
Dept: PHYSICAL THERAPY | Facility: CLINIC | Age: 54
End: 2024-01-15
Payer: COMMERCIAL

## 2024-01-17 ENCOUNTER — TREATMENT (OUTPATIENT)
Dept: PHYSICAL THERAPY | Facility: CLINIC | Age: 54
End: 2024-01-17
Payer: COMMERCIAL

## 2024-01-17 ENCOUNTER — OFFICE VISIT (OUTPATIENT)
Dept: SPORTS MEDICINE | Facility: CLINIC | Age: 54
End: 2024-01-17
Payer: COMMERCIAL

## 2024-01-17 VITALS
WEIGHT: 260 LBS | HEART RATE: 85 BPM | BODY MASS INDEX: 39.4 KG/M2 | HEIGHT: 68 IN | SYSTOLIC BLOOD PRESSURE: 118 MMHG | DIASTOLIC BLOOD PRESSURE: 86 MMHG

## 2024-01-17 DIAGNOSIS — M21.70 LEG LENGTH DISCREPANCY: ICD-10-CM

## 2024-01-17 DIAGNOSIS — M25.362 INSTABILITY OF LEFT KNEE JOINT: ICD-10-CM

## 2024-01-17 DIAGNOSIS — S83.8X2D INJURY OF MENISCUS OF KNEE, LEFT, SUBSEQUENT ENCOUNTER: ICD-10-CM

## 2024-01-17 DIAGNOSIS — M24.559 HIP FLEXOR TIGHTNESS, UNSPECIFIED LATERALITY: ICD-10-CM

## 2024-01-17 DIAGNOSIS — S89.92XD LEFT KNEE INJURY, SUBSEQUENT ENCOUNTER: ICD-10-CM

## 2024-01-17 DIAGNOSIS — M62.9 HAMSTRING TIGHTNESS OF BOTH LOWER EXTREMITIES: ICD-10-CM

## 2024-01-17 DIAGNOSIS — M22.8X9 PATELLAR MALTRACKING, UNSPECIFIED LATERALITY: ICD-10-CM

## 2024-01-17 DIAGNOSIS — S76.112D QUADRICEPS STRAIN, LEFT, SUBSEQUENT ENCOUNTER: ICD-10-CM

## 2024-01-17 DIAGNOSIS — S83.412D SPRAIN OF MEDIAL COLLATERAL LIGAMENT OF LEFT KNEE, SUBSEQUENT ENCOUNTER: ICD-10-CM

## 2024-01-17 DIAGNOSIS — M17.12 LOCALIZED OSTEOARTHRITIS OF LEFT KNEE: ICD-10-CM

## 2024-01-17 DIAGNOSIS — M25.562 ACUTE PAIN OF LEFT KNEE: ICD-10-CM

## 2024-01-17 DIAGNOSIS — Q66.6 VALGUS DEFORMITY OF BOTH FEET: ICD-10-CM

## 2024-01-17 DIAGNOSIS — M71.22 SYNOVIAL CYST OF LEFT POPLITEAL SPACE: ICD-10-CM

## 2024-01-17 DIAGNOSIS — S76.312D HAMSTRING STRAIN, LEFT, SUBSEQUENT ENCOUNTER: ICD-10-CM

## 2024-01-17 PROCEDURE — 3079F DIAST BP 80-89 MM HG: CPT | Performed by: FAMILY MEDICINE

## 2024-01-17 PROCEDURE — 3074F SYST BP LT 130 MM HG: CPT | Performed by: FAMILY MEDICINE

## 2024-01-17 PROCEDURE — 2500000004 HC RX 250 GENERAL PHARMACY W/ HCPCS (ALT 636 FOR OP/ED): Mod: JZ | Performed by: FAMILY MEDICINE

## 2024-01-17 PROCEDURE — 97140 MANUAL THERAPY 1/> REGIONS: CPT | Mod: GP

## 2024-01-17 PROCEDURE — 20611 DRAIN/INJ JOINT/BURSA W/US: CPT | Performed by: FAMILY MEDICINE

## 2024-01-17 PROCEDURE — 1036F TOBACCO NON-USER: CPT | Performed by: FAMILY MEDICINE

## 2024-01-17 PROCEDURE — 99214 OFFICE O/P EST MOD 30 MIN: CPT | Performed by: FAMILY MEDICINE

## 2024-01-17 PROCEDURE — 97110 THERAPEUTIC EXERCISES: CPT | Mod: GP

## 2024-01-17 RX ADMIN — SODIUM HYALURONATE INTRA-ARTICULAR SOLN PREF SYR 25 MG/2.5ML 20 MG: 25/2.5 SOLUTION PREFILLED SYRINGE at 11:43

## 2024-01-17 ASSESSMENT — PAIN SCALES - GENERAL
PAINLEVEL_OUTOF10: 3
PAINLEVEL: 3

## 2024-01-17 ASSESSMENT — PATIENT HEALTH QUESTIONNAIRE - PHQ9
2. FEELING DOWN, DEPRESSED OR HOPELESS: NOT AT ALL
SUM OF ALL RESPONSES TO PHQ9 QUESTIONS 1 AND 2: 0
1. LITTLE INTEREST OR PLEASURE IN DOING THINGS: NOT AT ALL

## 2024-01-17 ASSESSMENT — LIFESTYLE VARIABLES
HAS A RELATIVE, FRIEND, DOCTOR, OR ANOTHER HEALTH PROFESSIONAL EXPRESSED CONCERN ABOUT YOUR DRINKING OR SUGGESTED YOU CUT DOWN: NO
AUDIT-C TOTAL SCORE: 2
HAVE YOU OR SOMEONE ELSE BEEN INJURED AS A RESULT OF YOUR DRINKING: NO
HOW MANY STANDARD DRINKS CONTAINING ALCOHOL DO YOU HAVE ON A TYPICAL DAY: 1 OR 2
SKIP TO QUESTIONS 9-10: 1
HOW OFTEN DO YOU HAVE A DRINK CONTAINING ALCOHOL: 2-4 TIMES A MONTH
HOW OFTEN DO YOU HAVE SIX OR MORE DRINKS ON ONE OCCASION: NEVER
AUDIT TOTAL SCORE: 2

## 2024-01-17 ASSESSMENT — PAIN DESCRIPTION - DESCRIPTORS: DESCRIPTORS: ACHING

## 2024-01-17 ASSESSMENT — ENCOUNTER SYMPTOMS
OCCASIONAL FEELINGS OF UNSTEADINESS: 0
DEPRESSION: 0
LOSS OF SENSATION IN FEET: 0

## 2024-01-17 ASSESSMENT — PAIN - FUNCTIONAL ASSESSMENT: PAIN_FUNCTIONAL_ASSESSMENT: 0-10

## 2024-01-17 NOTE — PROGRESS NOTES
Physical Therapy Treatment    Patient Name: Narciso Pardo  MRN: [id number]  Encounter date:  1/17/2024  Time Calculation  Start Time: 1025  Stop Time:  1110  Time Calculation (min): 45 min  PT Therapeutic Procedures Time Entry  Manual Therapy Time Entry: 10   Therapeutic Exercise Time Entry: 35    Visit Number:  6/7 (including evaluation)  Planned total visits: 7  Visit Authorized:  7 in 2023- 3 more in 2024    SERVICES PROVIDED BY PTA    Current Problem  1. Acute pain of left knee  Follow Up In Physical Therapy      2. Sprain of medial collateral ligament of left knee, subsequent encounter  Follow Up In Physical Therapy      3. Left knee injury, subsequent encounter  Follow Up In Physical Therapy      4. Injury of meniscus of knee, left, subsequent encounter  Follow Up In Physical Therapy    Horizontal tear of the body and posterior horn segments of the  medial meniscus.      5. Hamstring strain, left, subsequent encounter  Follow Up In Physical Therapy      6. Quadriceps strain, left, subsequent encounter  Follow Up In Physical Therapy      7. Patellar maltracking, unspecified laterality  Follow Up In Physical Therapy      8. Hamstring tightness of both lower extremities  Follow Up In Physical Therapy      9. Hip flexor tightness, unspecified laterality  Follow Up In Physical Therapy        Precautions  Precautions Comment: none     Pain 3/10    Subjective  General Patient reports the usual morning pain and stiffness with noticeable L knee  weakness vs. R knee this morning. Patient cannot point to any specific activities that exacerbate his L knee pain. Patient relays he has his next appointment in his therapeutic injection series immediately after this visit.     Objective  Forward bending at the waist vs. knee bending to achieve more depth on squats. Squats to chair seat used to correct form, performed set without chair with corrected form. L knee strength < R with all therex.     Treatment:  Ther-ex:  -  "Nustep level 2, 5'   - Squat with flathand support 3 x10, used chair w/ 2nd set    - Heel tap downs from 4\" step with flat hand support 2x5 bilat   - Standing hip 4 way with GTB anchored to //bars  2x10 bilat  - Standing hamstring curls with GTB around ankles x20 bilat  - Standing marches with GTB looped around feet 2x10 bilat  - Seated LAQ with GTB looped around ankles 2x10 bilat with eccentric focus  - Step ups 12\" x 10 ea L/R   - Lateral Step ups, 8\" step, x 10 L/R    Manual:  - STM/MFR through the adductors and medial knee     Activity tolerance: Good    Assessment:     Decreased L knee stiffness following STM. LLE muscle fatigue vs. pain following therex. Verbal and visual cueing provided for instruction of added therex and corrected squat form, good follow through. Increased resistance band strength for therex, provided pt green theraband for use on HEP. Advised patient to use corrected squat form to sit into and rise from chairs at home, patient verbalized understanding.      Pt's response to treatment:  Good  Areas of improvements:  Corrected exercise form, Decreased  L knee pain  Limitations/deficits:  L knee strength vs. R    Pain end of session:  0/10    Plan:    Continue with current POC/no changes    OP PT Plan  Treatment/Interventions: Aquatic therapy, Cryotherapy, Dry needling, Education/ Instruction, Electrical stimulation, Gait training, Hot pack, Laser, Manual therapy, Neuromuscular re-education, Taping techniques, Therapeutic activities, Therapeutic exercises, Ultrasound  PT Plan: Skilled PT  PT Frequency:  (1-2x/wk)  Duration: 12wks  Onset Date: 11/28/23  Certification Period Start Date: 11/28/23  Certification Period End Date: 02/26/24  Number of Treatments Authorized: 10 then recheck - allowed 20/yr with 13 used on previous POC  Rehab Potential: Good  Plan of Care Agreement: Patient  Continue with current POC with the following changes decrease frequency to 1x/wk - every other week (depending " "on pt preference for 5 more sessions.     Assessment of current progress against goals:  Progressing toward functional goals and Symptomatic relief with treatment    Goals:  Active       PT Problem       PT STG       Start:  11/28/23    Expected End:  01/12/24       - Pt will complete the HEP with <3 verbal cues for correction  - Pt will demonstrate 2pt improvement on the NPRS, allowing for improved tolerance of functional activities.  - Pt will demonstrate ability to perform 5 step downs from 6\" step with 1 UE support in order to demonstrate improved ability to descend stairs.           PT LTG       Start:  11/28/23    Expected End:  02/26/24       - Pt will be independent in HEP & symptom management  - Pt will demonstrate a 4 pt improvement for knee pain on the NPRS, allowing for improved tolerance to perform daily functional activities.   - Pt will demonstrate at least 120deg knee flexion AROM without onset of pain, allowing for a normal pattern with performance of stairs.  - Pt will demonstrate ability to perform 10 sit to stands without UE use in order to demonstrate improved functional LE strength and improved independence with functional mobility  - Pt will demonstrate ability to perform 10 step downs from 8\" step with 1 UE support and without aggravation of symptoms in order to demonstrate improved ability to descend stairs.  - Pt will demonstrate 5/5 MMT grading throughout hip/knee musculature, allowing for appropriate muscle recruitment during daily activities.   - Pt will demonstrate normal mechanics without pain during gait and performance of stairs, allowing for pt to return to navigating the community.  - Pt will perform tandem stance >30\" with EO leading with ea LE in order to demonstrate improved proprioception and stability.  - Pt will demonstrate improved WOMAC score by 11 points (MCID) in order to demonstrate improved functional mobility.            Patient Stated Goal 1       Start:  11/28/23    " "Expected End:  02/26/24       \"Get rid of pain\"           Active         PT Problem         PT STG         Start:  11/28/23    Expected End:  01/12/24        - Pt will complete the HEP with <3 verbal cues for correction  - Pt will demonstrate 2pt improvement on the NPRS, allowing for improved tolerance of functional activities.  - Pt will demonstrate ability to perform 5 step downs from 6\" step with 1 UE support in order to demonstrate improved ability to descend stairs.              PT LTG         Start:  11/28/23    Expected End:  02/26/24        - Pt will be independent in HEP & symptom management  - Pt will demonstrate a 4 pt improvement for knee pain on the NPRS, allowing for improved tolerance to perform daily functional activities.   - Pt will demonstrate at least 120deg knee flexion AROM without onset of pain, allowing for a normal pattern with performance of stairs.  - Pt will demonstrate ability to perform 10 sit to stands without UE use in order to demonstrate improved functional LE strength and improved independence with functional mobility  - Pt will demonstrate ability to perform 10 step downs from 8\" step with 1 UE support and without aggravation of symptoms in order to demonstrate improved ability to descend stairs.  - Pt will demonstrate 5/5 MMT grading throughout hip/knee musculature, allowing for appropriate muscle recruitment during daily activities.   - Pt will demonstrate normal mechanics without pain during gait and performance of stairs, allowing for pt to return to navigating the community.  - Pt will perform tandem stance >30\" with EO leading with ea LE in order to demonstrate improved proprioception and stability.  - Pt will demonstrate improved WOMAC score by 11 points (MCID) in order to demonstrate improved functional mobility.               Patient Stated Goal 1         Start:  11/28/23    Expected End:  02/26/24        \"Get rid of pain\"           "

## 2024-01-29 ENCOUNTER — TREATMENT (OUTPATIENT)
Dept: PHYSICAL THERAPY | Facility: CLINIC | Age: 54
End: 2024-01-29
Payer: COMMERCIAL

## 2024-01-29 DIAGNOSIS — M62.9 HAMSTRING TIGHTNESS OF BOTH LOWER EXTREMITIES: ICD-10-CM

## 2024-01-29 DIAGNOSIS — M25.562 ACUTE PAIN OF LEFT KNEE: ICD-10-CM

## 2024-01-29 DIAGNOSIS — M24.559 HIP FLEXOR TIGHTNESS, UNSPECIFIED LATERALITY: ICD-10-CM

## 2024-01-29 DIAGNOSIS — S89.92XD LEFT KNEE INJURY, SUBSEQUENT ENCOUNTER: ICD-10-CM

## 2024-01-29 DIAGNOSIS — S76.312D HAMSTRING STRAIN, LEFT, SUBSEQUENT ENCOUNTER: ICD-10-CM

## 2024-01-29 DIAGNOSIS — S83.412D SPRAIN OF MEDIAL COLLATERAL LIGAMENT OF LEFT KNEE, SUBSEQUENT ENCOUNTER: ICD-10-CM

## 2024-01-29 DIAGNOSIS — M22.8X9 PATELLAR MALTRACKING, UNSPECIFIED LATERALITY: ICD-10-CM

## 2024-01-29 DIAGNOSIS — S76.112D QUADRICEPS STRAIN, LEFT, SUBSEQUENT ENCOUNTER: ICD-10-CM

## 2024-01-29 DIAGNOSIS — S83.8X2D INJURY OF MENISCUS OF KNEE, LEFT, SUBSEQUENT ENCOUNTER: ICD-10-CM

## 2024-01-29 PROCEDURE — 97110 THERAPEUTIC EXERCISES: CPT | Mod: GP

## 2024-01-29 PROCEDURE — 97140 MANUAL THERAPY 1/> REGIONS: CPT | Mod: GP

## 2024-01-29 ASSESSMENT — PAIN SCALES - GENERAL: PAINLEVEL_OUTOF10: 4

## 2024-01-29 NOTE — PROGRESS NOTES
"Physical Therapy Treatment    Patient Name: Narciso Pardo  MRN: [id number]  Encounter date:  1/29/2024  Time Calculation  Start Time: 1025  Stop Time:  1110  Time Calculation (min): 45 min  PT Therapeutic Procedures Time Entry  Manual Therapy Time Entry: 10   Therapeutic Exercise Time Entry: 35    Visit Number:  7/10 (including evaluation)  Planned total visits: 10  Visit Authorized:  7 in 2023- 3 more in 2024    Current Problem  1. Acute pain of left knee  Follow Up In Physical Therapy      2. Sprain of medial collateral ligament of left knee, subsequent encounter  Follow Up In Physical Therapy      3. Left knee injury, subsequent encounter  Follow Up In Physical Therapy      4. Injury of meniscus of knee, left, subsequent encounter  Follow Up In Physical Therapy    Horizontal tear of the body and posterior horn segments of the  medial meniscus.      5. Hamstring strain, left, subsequent encounter  Follow Up In Physical Therapy      6. Quadriceps strain, left, subsequent encounter  Follow Up In Physical Therapy      7. Patellar maltracking, unspecified laterality  Follow Up In Physical Therapy      8. Hamstring tightness of both lower extremities  Follow Up In Physical Therapy      9. Hip flexor tightness, unspecified laterality  Follow Up In Physical Therapy        Precautions  Precautions Comment: none     Pain 4/10    Subjective  General Patient reports that he had his final injection recently. He notes that he tried to bowl last Thursday and felt good during bowling, but had some posterior knee and patellar tendon soreness after. The posterior knee soreness has resolved, but the patellar tendon discomfort has remained.     Objective    Grade I tenderness over L patellar tendon    Treatment:  Ther-ex:  - Nustep level 3, 5'   - Squat with flathand support 2 x10  - Heel tap downs from 4\" step fwd and lat with flat hand support 2x5 ea bilat   - Step ups 8\" step, 2 x 10 ea L/R   - Lateral Step ups, 8\" step, 2 x " "10 L/R  - LAQ with 3# weights - full lift, longterm lower, lift, full lower x10 bilat  - SLR with 3# weights neutral and with ER x10 ea     Manual:  - STM/MFR through the medial knee   - Cross friction through patellar tendon    Activity tolerance: Good    Assessment:  Pt's response to treatment:  Good - the pt noted some discomfort with increased sets of step ups, however it did not increase his pain following completion of the exercise   Areas of improvements:  eccentric control  Limitations/deficits: patellar tendon discomfort    Pain end of session:  2/10    Plan:  OP PT Plan  Treatment/Interventions: Aquatic therapy, Cryotherapy, Dry needling, Education/ Instruction, Electrical stimulation, Gait training, Hot pack, Laser, Manual therapy, Neuromuscular re-education, Taping techniques, Therapeutic activities, Therapeutic exercises, Ultrasound  PT Plan: Skilled PT  PT Frequency:  (1-2x/wk)  Duration: 12wks  Onset Date: 11/28/23  Certification Period Start Date: 11/28/23  Certification Period End Date: 02/26/24  Number of Treatments Authorized: 10 then recheck - allowed 20/yr with 13 used on previous POC  Rehab Potential: Good  Plan of Care Agreement: Patient  Continue with current POC/no changes       Assessment of current progress against goals:  Progressing toward functional goals and Symptomatic relief with treatment    Goals:  Active       PT Problem       PT STG       Start:  11/28/23    Expected End:  01/12/24       - Pt will complete the HEP with <3 verbal cues for correction  - Pt will demonstrate 2pt improvement on the NPRS, allowing for improved tolerance of functional activities.  - Pt will demonstrate ability to perform 5 step downs from 6\" step with 1 UE support in order to demonstrate improved ability to descend stairs.           PT LTG       Start:  11/28/23    Expected End:  02/26/24       - Pt will be independent in HEP & symptom management  - Pt will demonstrate a 4 pt improvement for knee pain on " "the NPRS, allowing for improved tolerance to perform daily functional activities.   - Pt will demonstrate at least 120deg knee flexion AROM without onset of pain, allowing for a normal pattern with performance of stairs.  - Pt will demonstrate ability to perform 10 sit to stands without UE use in order to demonstrate improved functional LE strength and improved independence with functional mobility  - Pt will demonstrate ability to perform 10 step downs from 8\" step with 1 UE support and without aggravation of symptoms in order to demonstrate improved ability to descend stairs.  - Pt will demonstrate 5/5 MMT grading throughout hip/knee musculature, allowing for appropriate muscle recruitment during daily activities.   - Pt will demonstrate normal mechanics without pain during gait and performance of stairs, allowing for pt to return to navigating the community.  - Pt will perform tandem stance >30\" with EO leading with ea LE in order to demonstrate improved proprioception and stability.  - Pt will demonstrate improved WOMAC score by 11 points (MCID) in order to demonstrate improved functional mobility.            Patient Stated Goal 1       Start:  11/28/23    Expected End:  02/26/24       \"Get rid of pain\"             "

## 2024-02-09 ENCOUNTER — TREATMENT (OUTPATIENT)
Dept: PHYSICAL THERAPY | Facility: CLINIC | Age: 54
End: 2024-02-09
Payer: COMMERCIAL

## 2024-02-09 DIAGNOSIS — S76.112D QUADRICEPS STRAIN, LEFT, SUBSEQUENT ENCOUNTER: ICD-10-CM

## 2024-02-09 DIAGNOSIS — M62.9 HAMSTRING TIGHTNESS OF BOTH LOWER EXTREMITIES: ICD-10-CM

## 2024-02-09 DIAGNOSIS — S83.8X2D INJURY OF MENISCUS OF KNEE, LEFT, SUBSEQUENT ENCOUNTER: ICD-10-CM

## 2024-02-09 DIAGNOSIS — M24.559 HIP FLEXOR TIGHTNESS, UNSPECIFIED LATERALITY: ICD-10-CM

## 2024-02-09 DIAGNOSIS — M22.8X9 PATELLAR MALTRACKING, UNSPECIFIED LATERALITY: ICD-10-CM

## 2024-02-09 DIAGNOSIS — M25.562 ACUTE PAIN OF LEFT KNEE: ICD-10-CM

## 2024-02-09 DIAGNOSIS — S83.412D SPRAIN OF MEDIAL COLLATERAL LIGAMENT OF LEFT KNEE, SUBSEQUENT ENCOUNTER: ICD-10-CM

## 2024-02-09 DIAGNOSIS — S76.312D HAMSTRING STRAIN, LEFT, SUBSEQUENT ENCOUNTER: ICD-10-CM

## 2024-02-09 DIAGNOSIS — S89.92XD LEFT KNEE INJURY, SUBSEQUENT ENCOUNTER: ICD-10-CM

## 2024-02-09 PROCEDURE — 97140 MANUAL THERAPY 1/> REGIONS: CPT | Mod: GP

## 2024-02-09 PROCEDURE — 97110 THERAPEUTIC EXERCISES: CPT | Mod: GP

## 2024-02-09 ASSESSMENT — PAIN SCALES - GENERAL: PAINLEVEL_OUTOF10: 2

## 2024-02-09 NOTE — PROGRESS NOTES
"Physical Therapy Treatment    Patient Name: Narciso Pardo  MRN: [id number]  Encounter date:  2/9/2024  Time Calculation  Start Time: 1025  Stop Time:  1110  Time Calculation (min): 45 min  PT Therapeutic Procedures Time Entry  Manual Therapy Time Entry: 10   Therapeutic Exercise Time Entry: 35    Visit Number:  8/10 (including evaluation)  Planned total visits: 10  Visit Authorized:  7 in 2023- 3 in 2024    Current Problem  1. Acute pain of left knee  Follow Up In Physical Therapy      2. Sprain of medial collateral ligament of left knee, subsequent encounter  Follow Up In Physical Therapy      3. Left knee injury, subsequent encounter  Follow Up In Physical Therapy      4. Injury of meniscus of knee, left, subsequent encounter  Follow Up In Physical Therapy    Horizontal tear of the body and posterior horn segments of the  medial meniscus.      5. Hamstring strain, left, subsequent encounter  Follow Up In Physical Therapy      6. Quadriceps strain, left, subsequent encounter  Follow Up In Physical Therapy      7. Patellar maltracking, unspecified laterality  Follow Up In Physical Therapy      8. Hamstring tightness of both lower extremities  Follow Up In Physical Therapy      9. Hip flexor tightness, unspecified laterality  Follow Up In Physical Therapy        Precautions  Precautions Comment: none     Pain 4/10    Subjective  General Patient reports that he has been working a lot and had been having more pain behind the knee. He then went bowling yesterday with his hinged brace on and his knee has felt better today. The pt is feeling confident that he can trial self-management for 30days at this time    Objective    Grade I tenderness over L quad tendon  4/10 with knee flexion of ~115deg at start of session; 0/10 with knee flexion of ~120deg at end of session    Treatment:  Ther-ex:  - recumbent bike level 3, 5'   - TKE with purple TB 3\" hold x20 bilat   - TKE against SB 3\" hold x20 bilat   - Standing gastroc " "stretch at wall 30\" x2 bilat   - Standing soleus stretch at wall 30\" x2 bilat   - Seated hamstring stretch 30\" holds x2 bilat   - Sidelying hip adduction x15 bilat    Manual:  - Seated cross friction over the quad tendon and medial hamstring tendons  - Instructed pt in self cross friction techniques    Assessment:  Pt's response to treatment:  Good - Pt tolerated exercises to target specific areas of concern/discomfort very well and reported feeling confident that he will be able to manage symptoms with those exercises. Pt is being placed on a 30day hold to trial self-management at this time.   Areas of improvements: pain free knee flexion  Limitations/deficits: muscle tightness    Pain end of session:  0/10    Plan:  OP PT Plan  Treatment/Interventions: Aquatic therapy, Cryotherapy, Dry needling, Education/ Instruction, Electrical stimulation, Gait training, Hot pack, Laser, Manual therapy, Neuromuscular re-education, Taping techniques, Therapeutic activities, Therapeutic exercises, Ultrasound  PT Plan: Skilled PT  PT Frequency:  (1-2x/wk)  Duration: 12wks  Onset Date: 11/28/23  Certification Period Start Date: 11/28/23  Certification Period End Date: 02/26/24  Number of Treatments Authorized: 10 then recheck - allowed 20/yr with 13 used on previous POC  Rehab Potential: Good  Plan of Care Agreement: Patient  Hold 30 days.  2 visit remaining       Assessment of current progress against goals:  Progressing toward functional goals and Symptomatic relief with treatment    Goals:  Active       PT Problem       PT STG       Start:  11/28/23    Expected End:  01/12/24       - Pt will complete the HEP with <3 verbal cues for correction  - Pt will demonstrate 2pt improvement on the NPRS, allowing for improved tolerance of functional activities.  - Pt will demonstrate ability to perform 5 step downs from 6\" step with 1 UE support in order to demonstrate improved ability to descend stairs.           PT LTG       Start:  " "11/28/23    Expected End:  02/26/24       - Pt will be independent in HEP & symptom management  - Pt will demonstrate a 4 pt improvement for knee pain on the NPRS, allowing for improved tolerance to perform daily functional activities.   - Pt will demonstrate at least 120deg knee flexion AROM without onset of pain, allowing for a normal pattern with performance of stairs.  - Pt will demonstrate ability to perform 10 sit to stands without UE use in order to demonstrate improved functional LE strength and improved independence with functional mobility  - Pt will demonstrate ability to perform 10 step downs from 8\" step with 1 UE support and without aggravation of symptoms in order to demonstrate improved ability to descend stairs.  - Pt will demonstrate 5/5 MMT grading throughout hip/knee musculature, allowing for appropriate muscle recruitment during daily activities.   - Pt will demonstrate normal mechanics without pain during gait and performance of stairs, allowing for pt to return to navigating the community.  - Pt will perform tandem stance >30\" with EO leading with ea LE in order to demonstrate improved proprioception and stability.  - Pt will demonstrate improved WOMAC score by 11 points (MCID) in order to demonstrate improved functional mobility.            Patient Stated Goal 1       Start:  11/28/23    Expected End:  02/26/24       \"Get rid of pain\"           "

## 2024-02-12 ENCOUNTER — APPOINTMENT (OUTPATIENT)
Dept: PHYSICAL THERAPY | Facility: CLINIC | Age: 54
End: 2024-02-12
Payer: COMMERCIAL

## 2024-02-26 ENCOUNTER — OFFICE VISIT (OUTPATIENT)
Dept: PRIMARY CARE | Facility: CLINIC | Age: 54
End: 2024-02-26
Payer: COMMERCIAL

## 2024-02-26 VITALS
SYSTOLIC BLOOD PRESSURE: 142 MMHG | OXYGEN SATURATION: 98 % | TEMPERATURE: 98 F | WEIGHT: 273.6 LBS | HEIGHT: 68 IN | DIASTOLIC BLOOD PRESSURE: 90 MMHG | BODY MASS INDEX: 41.46 KG/M2 | HEART RATE: 76 BPM

## 2024-02-26 DIAGNOSIS — J01.00 ACUTE NON-RECURRENT MAXILLARY SINUSITIS: ICD-10-CM

## 2024-02-26 DIAGNOSIS — I10 PRIMARY HYPERTENSION: ICD-10-CM

## 2024-02-26 DIAGNOSIS — E11.9 CONTROLLED TYPE 2 DIABETES MELLITUS WITHOUT COMPLICATION, WITHOUT LONG-TERM CURRENT USE OF INSULIN (MULTI): Primary | ICD-10-CM

## 2024-02-26 DIAGNOSIS — N52.01 ERECTILE DYSFUNCTION DUE TO ARTERIAL INSUFFICIENCY: ICD-10-CM

## 2024-02-26 PROCEDURE — 83036 HEMOGLOBIN GLYCOSYLATED A1C: CPT | Performed by: FAMILY MEDICINE

## 2024-02-26 PROCEDURE — 99214 OFFICE O/P EST MOD 30 MIN: CPT | Performed by: FAMILY MEDICINE

## 2024-02-26 PROCEDURE — 3080F DIAST BP >= 90 MM HG: CPT | Performed by: FAMILY MEDICINE

## 2024-02-26 PROCEDURE — 3077F SYST BP >= 140 MM HG: CPT | Performed by: FAMILY MEDICINE

## 2024-02-26 RX ORDER — TIRZEPATIDE 2.5 MG/.5ML
2.5 INJECTION, SOLUTION SUBCUTANEOUS
Qty: 2 ML | Refills: 1 | Status: SHIPPED | OUTPATIENT
Start: 2024-02-26

## 2024-02-26 RX ORDER — SILDENAFIL 100 MG/1
100 TABLET, FILM COATED ORAL DAILY PRN
Qty: 30 TABLET | Refills: 5 | Status: SHIPPED | OUTPATIENT
Start: 2024-02-26

## 2024-02-26 ASSESSMENT — PATIENT HEALTH QUESTIONNAIRE - PHQ9
1. LITTLE INTEREST OR PLEASURE IN DOING THINGS: NOT AT ALL
2. FEELING DOWN, DEPRESSED OR HOPELESS: NOT AT ALL
SUM OF ALL RESPONSES TO PHQ9 QUESTIONS 1 AND 2: 0

## 2024-02-26 ASSESSMENT — PAIN SCALES - GENERAL: PAINLEVEL: 1

## 2024-02-26 NOTE — PROGRESS NOTES
"Subjective   Patient ID: Narciso Pardo is a 54 y.o. male who presents for Diabetes (A1c 6.8 today- foot exam due), Hypertension, and Nasal Congestion (Post nasal x 3 days).    Diabetes mellitus, medication  Hypertension   Requesting medication refill for erectile dysfunction    Testicle sensitive rhinorrhea maxillary facial pain and pressure over the past 1 to 2 weeks         Review of Systems   Constitutional:  Negative for fever.        Also see HPI   Eyes:  Negative for visual disturbance.   Respiratory:  Negative for shortness of breath.    Cardiovascular:  Negative for chest pain.   Gastrointestinal:  Negative for diarrhea and nausea.   Endocrine: Negative.    Genitourinary:  Negative for difficulty urinating.   Skin:  Negative for rash.   Neurological:  Negative for dizziness.        No focal deficits   Psychiatric/Behavioral:  Negative for suicidal ideas.    All other systems reviewed and are negative.      Objective   /90   Pulse 76   Temp 36.7 °C (98 °F)   Ht 1.727 m (5' 8\")   Wt 124 kg (273 lb 9.6 oz)   SpO2 98%   BMI 41.60 kg/m²     Physical Exam  Vitals and nursing note reviewed.   Constitutional:       Appearance: Normal appearance.   HENT:      Head: Normocephalic and atraumatic.      Comments: Cobblestoning noted in the throat and bilateral maxillary sinus tenderness  Eyes:      Extraocular Movements: Extraocular movements intact.      Conjunctiva/sclera: Conjunctivae normal.   Cardiovascular:      Rate and Rhythm: Normal rate and regular rhythm.      Heart sounds: Normal heart sounds.   Pulmonary:      Effort: Pulmonary effort is normal.      Breath sounds: Normal breath sounds.      Comments: Lungs essentially CTA b/l  Abdominal:      General: There is no distension.      Palpations: Abdomen is soft. There is no mass.      Tenderness: There is no abdominal tenderness.   Musculoskeletal:      Right lower leg: No edema.      Left lower leg: No edema.   Skin:     Coloration: Skin is " not jaundiced.      Findings: No rash.   Neurological:      General: No focal deficit present.      Mental Status: He is alert and oriented to person, place, and time.   Psychiatric:         Mood and Affect: Mood normal.         Behavior: Behavior normal.         Thought Content: Thought content normal.         Judgment: Judgment normal.         Assessment/Plan   Diagnoses and all orders for this visit:  Controlled type 2 diabetes mellitus without complication, without long-term current use of insulin (CMS/[facility])  -     POCT glycosylated hemoglobin (Hb A1C) manually resulted  -     tirzepatide (Mounjaro) 2.5 mg/0.5 mL pen injector; Inject 2.5 mg under the skin 1 (one) time per week.  Erectile dysfunction due to arterial insufficiency  -     sildenafil (Viagra) 100 mg tablet; Take 1 tablet (100 mg) by mouth once daily as needed for erectile dysfunction. 1 hour prior to intercourse Orally Once a day as needed  Primary hypertension  Acute non-recurrent maxillary sinusitis

## 2024-03-12 ENCOUNTER — DOCUMENTATION (OUTPATIENT)
Dept: PHYSICAL THERAPY | Facility: CLINIC | Age: 54
End: 2024-03-12
Payer: COMMERCIAL

## 2024-03-12 NOTE — PROGRESS NOTES
Physical Therapy    Discharge Summary    Name: Narciso Pardo  MRN: [id number]  : 1970  Date: 24    Discharge Summary: PT    Discharge Information: Date of discharge 3/12/24, Date of last visit 24, Date of evaluation 23, Number of attended visits 8, Referred by [facility], and Referred for L knee pain    Therapy Summary: At the pts most recent session, he wished to trial self-management, so his chart was placed on a 30day hold. The pt has not scheduled additional sessions, so the pt is being discharged at this time.    Rehab Discharge Reason: Other discharged to self management with continued use of HEP

## 2024-03-15 ASSESSMENT — ENCOUNTER SYMPTOMS
FEVER: 0
NAUSEA: 0
SHORTNESS OF BREATH: 0
DIARRHEA: 0
ENDOCRINE NEGATIVE: 1
DIFFICULTY URINATING: 0
DIZZINESS: 0

## 2024-03-25 ENCOUNTER — TELEPHONE (OUTPATIENT)
Dept: PRIMARY CARE | Facility: CLINIC | Age: 54
End: 2024-03-25
Payer: COMMERCIAL

## 2024-03-25 DIAGNOSIS — E11.9 CONTROLLED TYPE 2 DIABETES MELLITUS WITHOUT COMPLICATION, WITHOUT LONG-TERM CURRENT USE OF INSULIN (MULTI): ICD-10-CM

## 2024-03-25 NOTE — TELEPHONE ENCOUNTER
Patient has a gout flare up and is requesting medication for this. OTC nsaids are not helping.  Last OV 02/2024

## 2024-03-28 LAB — POC HEMOGLOBIN A1C: 6.8 % (ref 4.2–6.5)

## 2024-03-28 RX ORDER — TIRZEPATIDE 2.5 MG/.5ML
2.5 INJECTION, SOLUTION SUBCUTANEOUS
Qty: 2 ML | Refills: 1 | OUTPATIENT
Start: 2024-03-28

## 2024-03-28 NOTE — TELEPHONE ENCOUNTER
Patient called back to follow up on refill request for gout flare up  Patient also ask if his mounjaro medication be re submitted to pharm he was previously denied due to his A1C was below 6.5 today his A1C results 6.8 asking if this med can be resubmitted

## 2024-04-03 ENCOUNTER — HOSPITAL ENCOUNTER (OUTPATIENT)
Dept: RADIOLOGY | Facility: EXTERNAL LOCATION | Age: 54
Discharge: HOME | End: 2024-04-03

## 2024-04-03 DIAGNOSIS — S59.901A ELBOW INJURY, RIGHT, INITIAL ENCOUNTER: ICD-10-CM

## 2024-04-19 NOTE — PROGRESS NOTES
Verbal consent of the patient and/or verbal parental consent for patients under the age of 18 have been obtained to conduct a physical examination at this office visit.    Established patient  History Of Present Illness  04/22/24 Narciso Pardo is a 54 y.o. male who presents for a reevaluation of his LEFT knee. He continues to perform his home strengthening and stretching exercises previously learned in Physical Therapy. He cites performing hamstring stretches throughout the day while at work while bending over to touch his toes. He notes mild improvement in pain and functionality since his first series of viscosupplementation injections x3 months ago. He describes his pain at an ache along the inferomedial aspect of his left patella. He cites squatting or when transitioning from sitting to standing is problematic, says he has to pause to loosen his knee/musculature before he can take a step. He wears shoes with good stability control as previously recommended. He rates his pain at 5/10 today, stating pain can reach 7/10 on occasion. He takes previously prescribed Ibuprofen and applies Voltaren and/or BioFreeze for pain management with mild, temporary relief. He wears an OTC Suhail Copper knee sleeve and previously supplied Playmaker especially during bowling for support.    he still would like to try to avoid surgical intervention is much as possible.  We talked in detail about flexibility exercises that he could do before and after bed.  Additionally we talked about other health issues especially weight loss how it will help with his knees as well.  Overall he is happy with the results of the injections previously and would like to continue on with those in the future as well as continue to work on his home exercise program and additionally his quality of life and health      All previous Progress Notes and imaging results related to this patients chief complaint have been reviewed in preparation for this  examination.    Past Medical History  He has a past medical history of Fatty (change of) liver, not elsewhere classified, Hypertension, Other conditions influencing health status, Other obesity (05/11/2017), Other obesity (03/30/2018), Personal history of other diseases of the musculoskeletal system and connective tissue, Personal history of other diseases of the musculoskeletal system and connective tissue, Personal history of other mental and behavioral disorders, and Type 2 diabetes mellitus without complications (Multi) (02/03/2017).    Surgical History  He has a past surgical history that includes Shoulder surgery (Left); Wrist surgery (Left); and Carpal tunnel release (Right).     Social History  He reports that he has never smoked. He has never used smokeless tobacco. He reports current alcohol use of about 7.0 standard drinks of alcohol per week. He reports that he does not use drugs.    Family History  Family History   Problem Relation Name Age of Onset    Diabetes Mother      Diabetes Father      No Known Problems Brother      No Known Problems Brother      No Known Problems Daughter      No Known Problems Son          Allergies  Patient has no known allergies.    Historical Clinical Intake  May 10, 2023--- EAE --- Verbal consent of the patient and/or verbal parental consent for patients under the age of 18 have been obtained to conduct a physical examination at this office visit. Newly established patient. Pt is a 53yr old male, who works as a . He has been graciously referred to this office by Dr. Villalobos for LEFT knee pain that has been bothering him for a month. No known trauma or injury. 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 By Monday, he noticed pain that started in the back of his knee and then moved to the inside. Pain with flexion, extension and squatting. Pt notes that his pain has caused him to start walking with a  limp. He purchased a compression knee sleeve as well as alternating between Aleve and Ibuprofen with no relief in pain prior to seeing his primary care provider. Upon suggestion, patient has recently purchased a hinged knee brace and has been taking Prednisone as prescribed by his primary care provider. He believes that both have currently helped provide some relief in his pain. Rates current pain as a 5/10 describing it as a constant ache, though prior to the new brace and Prednisone, his pain was a 7/10. Denies any numbness or tingling. No previous history of injury.  ---------------------------------------  June 29, 2023 Above note reviewed. Verbal consent of the patient and/or verbal parental consent for patients under the age of 18 have been obtained to conduct a physical examination at this office visit. Narciso returns today for his follow up LEFT knee. Serg presents today with increased pain to LEFT medial knee and additionally LEFT lumbar pain. His gait is ataxic, although he isn't using any assistance devices. He rates his LEFT knee pain 6/10 described as aching and his Left LUMBAR pain as 8/10 described as burning and stabbing. He has not started physical therapy yet, he was out of town and he plans on starting soon. He is wearing a copper knee brace for additional support and he does take the Move free supplement. He adds that he saw Dr Villalobos for his Lumbar pain and he did some xrays and put him on Prednisone, which did not help. Serg denies and tingling or numbness. He takes ibuprofen for pain and he applies ice for some relief. We discussed treatment options. Patient continues to show indications of a knee sprain for which I recommended that he wear a hinged metal brace. Patient was using an over-the-counter hinged middle brace but states that it did not of the any durability and fell apart. As such we will fit patient for playmaker brace for increased ability under ability for his knee and he will  start physical therapy as soon as possible. Patient also complaining of low back pain which may be exacerbated by his knee sprain after exam there indications of a low back strain we will amend his physical therapy order to include as lower back and can re-evaluate at his follow-up appointment. Patient verbalizes understanding and agreement with plan of care.  ---------------------------------------  July 5, 2023. HW  Verbal consent of the patient and/or verbal parental consent for patients under the age of 18 have been obtained to conduct a physical examination at this office visit. This 53 year old male presents with left sided low back pain. He presents today with a limp. He states he was here last Thursday for an appointment for his left knee. He mentioned at the appointment that his low back was bothering him. He states a couple weeks ago he went to [place] to [facility] and when he came home, his low back pain started. He was given a Toradol and Solumedrol injection and he was feeling better Friday morning when he woke up. He states Friday morning he fell down 6 steps on his buttocks, mostly on his left side. He states he also hit his left knee when he fell, but he is no longer having pain in his knee. He states he has pain all the time, with no relief. He has been taking Cyclobenzaprine and Diclofenac with no relief. He hasn't been able to sleep well due to his pain. He states he has more pain with back extension than back flexion. He denies any radiculitis. He was unable to go to work this weekend. 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 He iced, heated, took medications and tried stretching, but he is still in pain. He rates his pain at 8/10 and describes it as sharp. He is supposed to work the next 3 nights, but doesn't think is able to.  ---------------------------------------  August 2, 2023 Above notes reviewed. Verbal consent of the patient and/or verbal parental consent for patients  "under the age of 18 have been obtained to conduct a physical examination at this office visit. Narciso returns today for his follow up of his LOW BACK. He states that he is feeling slightly improved since his previous visit. Rates current pain as a 3.5/10 describing it as a stiffness. Pt has remained out of work since last seen in office. He has completed seven sessions of physical therapy since last visit and feels that it has been helping with his pain. We discussed treatment options. Patient continues to have point tenderness over the lower lumbar spine as well as significant restriction especially with extension lateral flexion and rotation. Patient continues to complain of pain although he states that is improved. Patient has only completed 3 weeks of physical therapy and as such we will continue with current treatment options for another 3-4 weeks and re-evaluate at that time. If patient continues to have pain tenderness and restriction we will consider an MRI at that time. Patient verbalizes understanding and agreement with plan of care.  ---------------------------------------  August 30, 2023 Above notes reviewed. Verbal consent of the patient and/or verbal parental consent for patients under the age of 18 have been obtained to conduct a physical examination at this office visit. Narciso returns today for his follow up of his low back. Pt states that he is feeling better since his previous visit. He feels that his back is back to normal but his knee pain comes and goes. Rates current back pain as a 2/10 describing it as stiff and his knee pain as a 3/10 describing it as a \"nagging\" pain. He continues with physical therapy with about six sessions left, noting that he feels it is helping his back more than his knee. Pt feels that he can return back to work and will bring in paperwork to be completed to document such. We discussed treatment options. Patient has been doing well with his physical therapy and " states that he is feeling largely back to normal. Upon exam patient does continue to have point tenderness over the lumbar spine specifically the L3 vertebrae and associated paraspinal muscles. As such we will order an MRI to better evaluate for any underlying pathology. Patient can return to work without restrictions and we can reevaluate after his MRI or as symptoms change. Patient verbalizes understanding and agreement with plan of care.  ---------------------------------------  September 26, 2023 Above notes reviewed. Verbal consent of the patient and/or verbal parental consent for patients under the age of 18 have been obtained to conduct a physical examination at this office visit. Previously established patient. Narciso returns today for his MRI follow up of his LUMBAR spine. States that his back is feeling somewhat better rating pain as a 2/10 describing it as stiffness. His knee however is worse, rating it as a 6/10. Pt has started and is continuing with physical therapy for his knee. He has been wearing the brace and notes that it is supportive but not making it feel better. We reviewed patient MRI results in detail. Patient verbalizes understanding of results. We discussed treatment options and will refer patient to Dr. Sosa for surgical consult and evaluation as recommended by radiology. Patient can follow-up after surgical consult for reevaluation of the knee and we can discuss treatment options at that time such as physical therapy or more advanced imaging. Patient verbalizes understanding and agreement with plan of care.  ---------------------------------------  11/2/2023---Narciso Pardo is a 53 y.o. male presenting with re-injury of his LEFT knee. Unknown trauma or injury. He works at [employer] and is on his feet for twelve hours a day. Pain has worsened over the past two weeks to the point that it is making it difficult to walk. Pain also is disrupting sleep at night. He states that his  pain primarily in the medial aspect of his knee. Pain with all range of motion. He does continue to wear the brace he was previously fitted for in this office and notes that while it does support his knee, it does nothing for his pain. Rates current pain as a 8/10 describing it as a constant throbbing pain with sitting and a shocking pain with movement. Pt continues to Move Free and NSAIDs for joint pain over the past month with no improvement in pain. Pt did physical therapy last time he was seen and notes that it did not make any improvements with his pain in his knee which was last done in early September.  ---------------------------------------   11/13/23 Narciso Pardo is a 53 y.o. male who presents to review his LEFT knee MRI. He completed Physical Therapy in September 2023. He continues to perform his home exercise program previously provided to him by Physical Therapy. He describes his pain as sharp and burning along the medial aspect of his left knee. He notes pain exacerbates when transitioning from sitting to standing, with ambulation and throughout the evening. He states stretching and stairs in both directions also increase pain. He cites pain disturbs his sleep. He walks with a myopathic gait. Narciso works 12 hour swing shifts at [employer] on a [employer] and states he has been unable to work a full shift since returning to work in September. He tells that he works on concrete floor and consistently goes up and down on ladders. He rates his pain at 7/10 today, but states pain reaches 10/10 daily. He states that his pain is constant. He continues to take previously prescribed Prednisone and takes OTC Ibuprofen, applies OTC Voltaren gel and ice with no relief. He has also worn previously supplied Playmaker knee brace but says his pain increased with use so he stopped wearing it. He also states that he is an avid bolwer and his LEFT leg is his sliding leg, putting more pressure and weight on it  once bowling.  He would like a cortisone shot if possible.   ---------------------------------------  11/28/23 Narciso Padro is a 53 y.o. male who presents for Supartz Injection 1/5 injection into the patients LEFT knee. He states that he is feeling better s/p cortisone injection and pain has improved significantly. He continues to c/o dull achy pain along the medial joint line of his LEFT knee. He has started into physical therapy and is performing his home exercise program as previously instructed. He denies any instability, locking, catching, numbness/tingling, or swelling at this time. He is taking all supplements as previously directed and is wearing shoes with good support as well as OTC insoles as previously recommended.  He rates pain at 2/10 today in his LEFT knee.   ---------------------------------------  12/12/23 Narciso Pardo is a 53 y.o. male who presents for Supartz Injection 2/5 injection into the patients LEFT knee. He denies any pain currently, but also notes that he is not bowling or doing a lot of activity currently. He is in physical therapy and is also performing his home exercise program as previously directed. He denies any swelling, locking, catching, or instability in his LEFT knee at this time.  --------------------------------------------------------------------  12/28/23 Narciso Pardo is a 53 y.o. male who presents for Supartz Injection 3/5 injection into the patients Left Knee. He continues to perform his home exercise program previously directed by Physical Therapy. He describes his pain as an ache diffusely throughout his left knee. He notes pain is greatest in the morning or with the first motions while driving. He rates his pain at 2/10 today. He is not utilizing any pain management interventions at this time.  He said he is very happy with the results of the injections so far is definitely noticed a difference not only with his range of motion but also his strength  and not having pain like he did previously.   -----------------------------------------------------------------  01/04/24 Narciso Pardo is a 53 y.o. male who presents for Supartz Injection 4/5 injection into his Left knee. He notes mild improvement since beginning viscosupplementation injections and performing his home exercise program. He reports generalized stiffness upon waking in the morning as well as while driving for extended periods of time that works itself out with activity/movement. He rates his pain at 1/10 today. He uses ice for pain management with mild, temporary relief. He wears a sleeve type brace with activity and has a custom brace previously supplied. He continues to be very happy with the results of the injections not only with his range of motion but also pain.  He says he will know for sure how well he is doing next week when he leaves for [place] because he has lots of stuff lined up for walking and hiking.   -----------------------------------------------------------------  01/17/24 Narciso Pardo is a 54 y.o. male who presents for Supartz Injection 5/5 injection into the patient's Left Knee. He states that he continues to have some pain to the medial aspect of his LEFT knee which has been worse in the subzero temperatures. Despite some increased soreness and aching to the knees with the weather he feels his overall pain has improved from before staring the viscosupplementation. Narciso does state that he wears a compression sleeve to the area which helps to keep the knee warm and less stiff. He continues to participate in physical therapy as well as perform his home exercises as directed. He wears full foot insoles in his shoes and good supportive footwear. The patient states he has not needed to treat his pain recently. Narciso reports that he is happy with the improvement to his knee pain since starting the injections and feels it will help him to avoid surgical intervention at  the present.    He says overall he is very happy with the results of the injections especially if he can keep him off of getting knee replacements done.  He says his knees felt very well while he was in [place] and he did lots of hiking at that time.    Review of Systems:  CONSTITUTIONAL:   Negative for weight change, loss of appetite, fatigue, weakness, fever, chills, night sweats, headaches .           HEENT:   Negative for cold, cough, sore throat, sinus pain, swollen lymph nodes.           OPHTHALMOLOGY:   Negative for diminished vision, blurred vision, loss of vision, double vision.           ALLERGY:   Negative for runny nose, scratchy throat, sinus congestion, rash, facial pressure, nasal congestion, post-nasal drip.           CARDIOLOGY:   Negative for chest pain, palpitations, murmurs, irregular heart beat, shortness of breath, leg edema, dyspnea on exertion, fatigue, dizziness.           RESPIRATORY:   Negative for chest pain, shortness of breath, swelling of the legs, asthma/copd, chest congestion, pain with breathing .           GASTROENTEROLOGY:   Negative for nausea, vomitting, heartburn, constipation, diarrhea, blood in stool, change in bowel habits, black stool.           HEMATOLOGY/LYMPH:   Negative for fatigue, loss of appetitie, easy bruising, easy bleeding, anemia, abnormal bleeding, slow healing.           ENDOCRINOLOGY:   Negative for polyuria, polydipsia, polyphagia, fatigue, weight loss, weight gain, cold intolerance, heat intolerance, diabetes.           MUSCULOSKELETAL:   Positive  for LEFT Knee pain        DERMATOLOGY:   Negative for rash, bruising.           NEUROLOGY:   Negative for tingling, numbness, gait abnormality, paresthesias, weakness, sciatica.         General Examination:  GENERAL APPEARANCE: Appears well, pleasant, and cooperative, NAD.   HEENT: Normal, unremarkable.   NECK: Supple.   HEART: RRR, normal S1S2.   LUNGS: Clear to auscultation bilaterally.   ABDOMEN: Soft, NT/ND,  BS present.   EXTREMITIES: No cyanosis, clubbing.   SKIN: No rash or cellulitis.   NEUROLOGIC EXAM: Awake, A&O x 3, CN's II-XII grossly intact.   PSYCH: Good eye contact, appropriate mood and affect        Examination:   overall knee improved with injections and home therapy  Left  Knee  Edema Negative  Effusion: Negative.   Percussion Test: Negative  Tuning Fork Test: Negative  Ecchymosis/Bruising: Negative.            Palpation: overall knee improved with injections and home therapy  Positive Tender to palpation Left  knee over the anterior horn and body of the medial meniscus, as well as over the distal hamstring  both medially  over the semimembranosus, semitendinosus and laterally over the distal biceps femoris    Orientation: Symmetrical           Range of Motion:overall knee improved with injections and home therapy  FROM equal bilaterally, however hamstring with resistance causes some pain     Muscle Strength:  improved since Physical Therapy and Supartz injections  Positive +4-+5/+5 Quadricep Extension Causes some pain  Positive +4-+5/+5  Hamstring Flexion Causes some pain          +5+5 Gastrocnemius  +5+5 Soleus.            Proprioception: improved since Physical Therapy and Supartz injections  Pain with Squat: Positive    Pain with Sumo Squat:  Positive    Hop Test: Positive    Single leg balance test Positive            Vascular:   +2/+4 Femoral  +2/+4 Dorsalis Pedis  +2/+4 Posterior Tibial  Capillary Refill less than 2 seconds.     Diagnostic studies:  MRI of the left knee without contrast  INDICATION: Signs/Symptoms:LEFT KNEE PAIN   COMPARISON: None.      ACCESSION NUMBER(S): [id number]    ORDERING CLINICIAN: JODIE TORRE      TECHNIQUE: Multiplanar multisequence MRI of the left knee was performed without intravenous contrast.      FINDINGS:  Menisci:  Lateral meniscus is intact. Horizontal tear of the body and posterior horn segments of the medial meniscus with fluid signal reaching the  inferior surface..      Cruciate ligaments: Intact.      Collateral ligaments: Intact.      Tendons:  Intact.      Muscles: Intact.      Bones:  No evidence of acute fracture. Bone marrow signal intensity  is within normal limits. Sequela of chronic Osgood-Schlatter with corticated prominence of the tibial tubercle.      Articular cartilage:  Lateral compartment: Intact.  Medial compartment: Mild thinning of the posterior weight-bearing medial femoral condyle involving a small area of proximally about 14 mm AP. Medial tibial plateau cartilage is intact. Patellofemoral  compartment: Focal near full-thickness chondral loss of superior aspect of the medial trochlear groove with surrounding areas of delamination.      Miscellaneous: A small knee joint effusion is noted. A small Baker's cyst is noted..      IMPRESSION:  1. Horizontal tear of the body and posterior horn segments of the medial meniscus.  2. Mild medial and patellofemoral compartment degenerative changes with chondral loss as described above.  3. Small Bakers cyst   4. Small joint effusion  5. Chronic Osgood Schlatter disease     ------------------------------------------------------------------------  PROCEDURE:  KNEE LT MIN 4 VIEW - TXR  [id number]  REASON FOR EXAM: ACUTE PAIN OF LEFT KNEE  RESULT: MRN: [id number]     Patient Name: FARZANA RUSH   STUDY: KNEE LT MIN 4 VIEW 5/10/2023 8:35 am  INDICATION: ACUTE PAIN OF LEFT KNEE 53-year-old man with left knee pain for 1 month, medially. No known injury  COMPARISON: Left tibial radiograph 02/03/2017     ACCESSION NUMBER(S): [id number]   ORDERING CLINICIAN: JODIE TORRE     TECHNIQUE: Four views of the left knee were performed.     FINDINGS:  No cortical irregularity or lucency is identified to suggest acute fracture or dislocation of the left knee. There is mild tricompartmental joint space narrowing with small osteophyte formation. No significant suprapatellar knee joint effusion. Prominent enthesophyte at  the tibial tubercle. Small enthesophytes superiorly and inferiorly at the patella.     IMPRESSION:  Degenerative changes of the left knee, as detailed above. No sign of acute fracture or significant suprapatellar knee joint effusion. If symptoms persist, consider MRI for further evaluation.     Dictation workstation:   [id number]   Original Interpreting Physician:   FRANCESCA HOWELL MD  Original Transcribed by/Date: MMODAL May 10 2023  8:13A        Knee - ACL:  improved since Physical Therapy and Supartz injections  Anterior Drawer Test: Positive    Lachman Test: Positive       Lelli Test:  Positive       KNEE - MCL / LCL:  improved since Physical Therapy and Supartz injections  Valgus Stress Test:  90 degrees: Negative, 20-30 degrees: Negative.   Varus Stress Test:  90 degrees: Positive  20-30 degrees: Positive   Apley Distraction Test: Positive Lateral.   Thessaly Test: Positive Anteior Lateral Meniscus, Posterior Lateral Meniscus, Origins and Insertion LCL, Distal Hamstring.         KNEE - IT BAND:  Cayla Test: Negative.   Noble Test: Negative.          KNEE - MENISCUS:  improved since Physical Therapy and Supartz injections  Apley Compression Test: Positive     Stienmann Test:  Positive    Terrance Test: Positive  medial joint line.   Bounce Home Test:  Positive   Thessaly Test:  Positive Anterior Lateral Joint Line Pain and Posterior Lateral Joint Line Pain.            KNEE - PATELLA:  improved since Physical Therapy and Supartz injections  Apprehension Test:  Positive   Glide Test:  Positive   Grind Test: Positive   Patella Tracking Test: Positive               KNEE - QUADRICEPS:   improved since Physical Therapy and Supartz injections  VMO Test: Positive    VLO Test:  Negative.              Feet/Foot: Positive  BILATERAL  Valgus foot            Assessment   1. Localized osteoarthritis of left knee        2. Injury of meniscus of knee, left, subsequent encounter        3. Synovial cyst of left popliteal space         4. Acute pain of left knee        5. Sprain of medial collateral ligament of left knee, subsequent encounter        6. Left knee injury, subsequent encounter        7. Patellar maltracking, unspecified laterality        8. Instability of left knee joint        9. Hamstring tightness of both lower extremities        10. Hip flexor tightness, unspecified laterality        11. Leg length discrepancy        12. Valgus deformity of both feet            Treatment or Intervention:  May continue to alternate ice and moist heat as needed    Continue to perform home strengthening and stretching exercises previously learned in Physical Therapy, especially hamstring stretches  went over these exercises and stretching again with the patient  Stressed the importance of  continuing to wear shoes with good stability control to help with the biomechanics affecting the knees as well as the lower extremities    Stressed the importance of  continuing to wear full foot insoles to help with the biomechanics affecting the knees as well as the lower extremities  Recommendation  to continue over-the-counter  vitamin-D 2 -3000+ milligrams a day, as well as a daily multivitamin.    Recommendation  to continue over-the-counter Move Free for joint health.    May continue to take OTC Ibuprofen may alternate with OTC Tylenol Extra Strength or OTC Tylenol Arthritis, taking one every 6-8 hours with food as needed.    Patient advised regarding the risks and/or potential adverse reactions and/or side effects of any prescribed medications along with any over-the-counter medications or any supplements used. Patient advised to seek immediate medical care if any adverse reactions occur. The patient and/or patient(s) parent(s) verbalized their understanding,   Discussed in detail with patient to the level of his understanding the possibility in the future of a referral to Orthopedics for surgical intervention to repair meniscus injury, although he  is very against surgical intervention at this time  Discussed possibility in the future of alternating regenerative injections with viscosupplementation into the patients LEFT knee   additionally discussed his health as well as the importance of weight loss  and the beneficial affect the  weight loss would be for the pain he is getting in his knees  When eligible in 3 months: Staff to initiate prior authorization process for viscosupplementation injections into the patients LEFT knee under ultrasound. Staff will contact the patient to schedule injections once approval is received. and Patient to decide whether or not they will alternate with regenerative Prolozone injections as they are not covered by insurance; a price quote for these out-of-pocket expenses has been provided to the patient at the time of this office visit.   possibility of regenerative injections in the future  Follow-up in 3 months for a reevaluation and/or Supartz #1 (NS) injection into the patients LEFT knee or sooner as needed.  Please note that this report has been produced using speech recognition software.  It may contain errors related to grammar, punctuation or spelling.  Electronically signed, but not reviewed.  ANN Cook, Director of Sports Medicine        ROBER SOLOMON on 4/22/24 at 2:58 PM.     ROBERT Cook DO

## 2024-04-22 ENCOUNTER — OFFICE VISIT (OUTPATIENT)
Dept: SPORTS MEDICINE | Facility: CLINIC | Age: 54
End: 2024-04-22
Payer: COMMERCIAL

## 2024-04-22 VITALS
SYSTOLIC BLOOD PRESSURE: 126 MMHG | DIASTOLIC BLOOD PRESSURE: 84 MMHG | WEIGHT: 260 LBS | HEIGHT: 68 IN | HEART RATE: 82 BPM | BODY MASS INDEX: 39.4 KG/M2

## 2024-04-22 DIAGNOSIS — M22.8X9 PATELLAR MALTRACKING, UNSPECIFIED LATERALITY: ICD-10-CM

## 2024-04-22 DIAGNOSIS — M25.362 INSTABILITY OF LEFT KNEE JOINT: ICD-10-CM

## 2024-04-22 DIAGNOSIS — M17.12 LOCALIZED OSTEOARTHRITIS OF LEFT KNEE: ICD-10-CM

## 2024-04-22 DIAGNOSIS — M21.70 LEG LENGTH DISCREPANCY: ICD-10-CM

## 2024-04-22 DIAGNOSIS — M24.559 HIP FLEXOR TIGHTNESS, UNSPECIFIED LATERALITY: ICD-10-CM

## 2024-04-22 DIAGNOSIS — Q66.6 VALGUS DEFORMITY OF BOTH FEET: ICD-10-CM

## 2024-04-22 DIAGNOSIS — M71.22 SYNOVIAL CYST OF LEFT POPLITEAL SPACE: ICD-10-CM

## 2024-04-22 DIAGNOSIS — M62.9 HAMSTRING TIGHTNESS OF BOTH LOWER EXTREMITIES: ICD-10-CM

## 2024-04-22 DIAGNOSIS — S89.92XD LEFT KNEE INJURY, SUBSEQUENT ENCOUNTER: ICD-10-CM

## 2024-04-22 DIAGNOSIS — S83.412D SPRAIN OF MEDIAL COLLATERAL LIGAMENT OF LEFT KNEE, SUBSEQUENT ENCOUNTER: ICD-10-CM

## 2024-04-22 DIAGNOSIS — S83.8X2D INJURY OF MENISCUS OF KNEE, LEFT, SUBSEQUENT ENCOUNTER: ICD-10-CM

## 2024-04-22 DIAGNOSIS — M25.562 ACUTE PAIN OF LEFT KNEE: ICD-10-CM

## 2024-04-22 PROCEDURE — 1036F TOBACCO NON-USER: CPT | Performed by: FAMILY MEDICINE

## 2024-04-22 PROCEDURE — 3079F DIAST BP 80-89 MM HG: CPT | Performed by: FAMILY MEDICINE

## 2024-04-22 PROCEDURE — 99214 OFFICE O/P EST MOD 30 MIN: CPT | Performed by: FAMILY MEDICINE

## 2024-04-22 PROCEDURE — 3074F SYST BP LT 130 MM HG: CPT | Performed by: FAMILY MEDICINE

## 2024-04-22 ASSESSMENT — PAIN SCALES - GENERAL
PAINLEVEL: 5
PAINLEVEL_OUTOF10: 5 - MODERATE PAIN

## 2024-04-22 ASSESSMENT — PAIN - FUNCTIONAL ASSESSMENT: PAIN_FUNCTIONAL_ASSESSMENT: 0-10

## 2024-04-22 ASSESSMENT — ENCOUNTER SYMPTOMS
OCCASIONAL FEELINGS OF UNSTEADINESS: 0
DEPRESSION: 0
LOSS OF SENSATION IN FEET: 0

## 2024-04-22 ASSESSMENT — LIFESTYLE VARIABLES
HOW OFTEN DURING THE LAST YEAR HAVE YOU BEEN UNABLE TO REMEMBER WHAT HAPPENED THE NIGHT BEFORE BECAUSE YOU HAD BEEN DRINKING: NEVER
HAS A RELATIVE, FRIEND, DOCTOR, OR ANOTHER HEALTH PROFESSIONAL EXPRESSED CONCERN ABOUT YOUR DRINKING OR SUGGESTED YOU CUT DOWN: NO
HOW OFTEN DURING THE LAST YEAR HAVE YOU FAILED TO DO WHAT WAS NORMALLY EXPECTED FROM YOU BECAUSE OF DRINKING: NEVER
HOW OFTEN DO YOU HAVE A DRINK CONTAINING ALCOHOL: 2-3 TIMES A WEEK
AUDIT TOTAL SCORE: 3
HOW OFTEN DO YOU HAVE SIX OR MORE DRINKS ON ONE OCCASION: NEVER
AUDIT-C TOTAL SCORE: 3
HAVE YOU OR SOMEONE ELSE BEEN INJURED AS A RESULT OF YOUR DRINKING: NO
HOW OFTEN DURING THE LAST YEAR HAVE YOU NEEDED AN ALCOHOLIC DRINK FIRST THING IN THE MORNING TO GET YOURSELF GOING AFTER A NIGHT OF HEAVY DRINKING: NEVER
SKIP TO QUESTIONS 9-10: 1
HOW OFTEN DURING THE LAST YEAR HAVE YOU FOUND THAT YOU WERE NOT ABLE TO STOP DRINKING ONCE YOU HAD STARTED: NEVER
HOW OFTEN DURING THE LAST YEAR HAVE YOU HAD A FEELING OF GUILT OR REMORSE AFTER DRINKING: NEVER
HOW MANY STANDARD DRINKS CONTAINING ALCOHOL DO YOU HAVE ON A TYPICAL DAY: 1 OR 2

## 2024-04-22 ASSESSMENT — PAIN DESCRIPTION - DESCRIPTORS: DESCRIPTORS: ACHING;DISCOMFORT

## 2024-06-28 ENCOUNTER — OFFICE VISIT (OUTPATIENT)
Dept: PRIMARY CARE | Facility: CLINIC | Age: 54
End: 2024-06-28
Payer: COMMERCIAL

## 2024-06-28 VITALS
HEART RATE: 72 BPM | HEIGHT: 68 IN | TEMPERATURE: 97.9 F | WEIGHT: 259 LBS | SYSTOLIC BLOOD PRESSURE: 122 MMHG | DIASTOLIC BLOOD PRESSURE: 80 MMHG | OXYGEN SATURATION: 97 % | BODY MASS INDEX: 39.25 KG/M2

## 2024-06-28 DIAGNOSIS — Z12.11 SCREENING FOR COLON CANCER: ICD-10-CM

## 2024-06-28 DIAGNOSIS — E11.9 CONTROLLED TYPE 2 DIABETES MELLITUS WITHOUT COMPLICATION, WITHOUT LONG-TERM CURRENT USE OF INSULIN (MULTI): Primary | ICD-10-CM

## 2024-06-28 PROCEDURE — 3074F SYST BP LT 130 MM HG: CPT | Performed by: FAMILY MEDICINE

## 2024-06-28 PROCEDURE — 99213 OFFICE O/P EST LOW 20 MIN: CPT | Performed by: FAMILY MEDICINE

## 2024-06-28 PROCEDURE — 3008F BODY MASS INDEX DOCD: CPT | Performed by: FAMILY MEDICINE

## 2024-06-28 PROCEDURE — 3079F DIAST BP 80-89 MM HG: CPT | Performed by: FAMILY MEDICINE

## 2024-06-28 RX ORDER — DULAGLUTIDE 0.75 MG/.5ML
0.75 INJECTION, SOLUTION SUBCUTANEOUS
Qty: 2 ML | Refills: 5 | Status: SHIPPED | OUTPATIENT
Start: 2024-06-30

## 2024-06-28 ASSESSMENT — ENCOUNTER SYMPTOMS
OCCASIONAL FEELINGS OF UNSTEADINESS: 0
DEPRESSION: 0
LOSS OF SENSATION IN FEET: 0

## 2024-06-28 ASSESSMENT — PAIN SCALES - GENERAL: PAINLEVEL: 2

## 2024-06-28 NOTE — PROGRESS NOTES
"Subjective   Patient ID: Narciso Pardo is a 54 y.o. male who presents for Weight Check.    Diabetes mellitus, takes medication, no symptoms no chest pain, no diabetic foot ulcers         Review of Systems   Constitutional:  Negative for fever.        Also see HPI   Eyes:  Negative for visual disturbance.   Respiratory:  Negative for shortness of breath.    Cardiovascular:  Negative for chest pain.   Gastrointestinal:  Negative for diarrhea and nausea.   Endocrine: Negative.    Genitourinary:  Negative for difficulty urinating.   Skin:  Negative for rash.   Neurological:  Negative for dizziness.        No focal deficits   Psychiatric/Behavioral:  Negative for suicidal ideas.    All other systems reviewed and are negative.      Objective   /80   Pulse 72   Temp 36.6 °C (97.9 °F)   Ht 1.727 m (5' 8\")   Wt 117 kg (259 lb)   SpO2 97%   BMI 39.38 kg/m²     Physical Exam  Vitals and nursing note reviewed.   Constitutional:       Appearance: Normal appearance.   HENT:      Head: Normocephalic and atraumatic.   Eyes:      Extraocular Movements: Extraocular movements intact.      Conjunctiva/sclera: Conjunctivae normal.   Cardiovascular:      Rate and Rhythm: Normal rate and regular rhythm.      Heart sounds: Normal heart sounds.   Pulmonary:      Effort: Pulmonary effort is normal.      Breath sounds: Normal breath sounds.      Comments: Lungs essentially CTA b/l  Abdominal:      General: There is no distension.      Palpations: Abdomen is soft. There is no mass.      Tenderness: There is no abdominal tenderness.   Musculoskeletal:      Right lower leg: No edema.      Left lower leg: No edema.   Skin:     Coloration: Skin is not jaundiced.      Findings: No rash.   Neurological:      General: No focal deficit present.      Mental Status: He is alert and oriented to person, place, and time.   Psychiatric:         Mood and Affect: Mood normal.         Behavior: Behavior normal.         Thought Content: Thought " content normal.         Judgment: Judgment normal.         Assessment/Plan   Diagnoses and all orders for this visit:  Controlled type 2 diabetes mellitus without complication, without long-term current use of insulin (Multi)  -     dulaglutide (Trulicity) 0.75 mg/0.5 mL pen injector; Inject 0.75 mg under the skin 1 (one) time per week.  Screening for colon cancer  -     Colonoscopy Screening; Average Risk Patient; Future

## 2024-07-01 ENCOUNTER — PHARMACY VISIT (OUTPATIENT)
Dept: PHARMACY | Facility: CLINIC | Age: 54
End: 2024-07-01
Payer: MEDICARE

## 2024-07-01 DIAGNOSIS — Z12.11 SCREEN FOR COLON CANCER: ICD-10-CM

## 2024-07-01 PROCEDURE — RXMED WILLOW AMBULATORY MEDICATION CHARGE

## 2024-07-01 RX ORDER — SODIUM PICOSULFATE, MAGNESIUM OXIDE, AND ANHYDROUS CITRIC ACID 12; 3.5; 1 G/175ML; G/175ML; MG/175ML
LIQUID ORAL
Qty: 350 ML | Refills: 0 | Status: SHIPPED | OUTPATIENT
Start: 2024-07-01

## 2024-07-15 ENCOUNTER — APPOINTMENT (OUTPATIENT)
Dept: SPORTS MEDICINE | Facility: CLINIC | Age: 54
End: 2024-07-15
Payer: COMMERCIAL

## 2024-07-22 ASSESSMENT — ENCOUNTER SYMPTOMS
FEVER: 0
NAUSEA: 0
ENDOCRINE NEGATIVE: 1
DIFFICULTY URINATING: 0
DIARRHEA: 0
SHORTNESS OF BREATH: 0
DIZZINESS: 0

## 2024-08-08 ENCOUNTER — TELEPHONE (OUTPATIENT)
Dept: SPORTS MEDICINE | Facility: CLINIC | Age: 54
End: 2024-08-08
Payer: COMMERCIAL

## 2024-08-08 NOTE — TELEPHONE ENCOUNTER
08/08/2024-Initiated benefits investigation via [id number] for Viscosupplementation Injections into the patients L.KNEE

## 2024-08-12 NOTE — TELEPHONE ENCOUNTER
08/12/2024- Per [id number]    SUPARTZ-    Patient has a Self-Funded, "[employer] Commercial PPO plan, effective from  04/17/2023. Follows [place] guidelines.  Drug Supartz FX is not covered under this patients plan. For more information  please refer to€MeetMe policy on  Hyaluronan€Injections€[email or web address].  Administration 78786/35545 are covered at 80% of the allowable. Deductible  must be met before coverage applies. Specialist office visits if billed are  covered at 100% of the allowable after a $40.0 copay. If out of pocket is met,  coverage goes to 100% and copay will no longer apply.  No pre-cert or referrals needed.  Medical notes might be requested to submit the claims  plan runs on a calendar year  As per the [employer] website, Provider is showing as In network/participating under  the given NPI : [id number] and the Address : [address] [place] [address]  Ref # [id number]     GELSYN-      Patient has a Self-Funded, "[employer] Commercial PPO plan, effective from  04/17/2023. Follows [place] guidelines.  Drug Gelsyn-3 is not covered under this patients plan. For more information  please refer to€MeetMe policy on  Hyaluronan€Injections€[email or web address]  [email or web address].  Administration 06181/87595 are covered at 80% of the allowable. Deductible  must be met before coverage applies. Specialist office visits if billed are  covered at 100% of the allowable after a $40.0 copay. If out of pocket is met,  coverage goes to 100% and copay will no longer apply.  No pre-cert or referrals needed.  Medical notes might be requested to submit the claims  plan runs on a calendar year  As per the [employer] website, Provider is showing as In network/participating under  the given NPI : [id number] and the Address : [address] [place] [address]  Ref # [id number]    DUROLANE-    Patient has a Self-Funded, "[employer] Commercial PPO  plan, effective from  04/17/2023. Follows [place] guidelines.  Drug Durolane is not covered under this patients plan. For more information  please refer to[facility] policy on  Hyaluronan€Injections€[email or web address].  Administration 20610/20611 are covered at 80% of the allowable. Deductible  must be met before coverage applies. Specialist office visits if billed are  covered at 100% of the allowable after a $40.0 copay. If out of pocket is met,  coverage goes to 100% and copay will no longer apply.  No pre-cert or referrals needed.  Medical notes might be requested to submit the claims  plan runs on a calendar year  As per the [employer] website, Provider is showing as In network/participating under  the given NPI : [id number] and the Address : [address] [place] [address] [facility]  Ref # [id number]     Will submit to Availity with preferred product.  PENDING

## 2024-08-15 NOTE — TELEPHONE ENCOUNTER
08/15/2024- Per Availity Thank you for waiting. It look like codes 42057 and 37788 does not requires review or authorization and If the patient become emergently admitted for any reason please notify [place] no later than 5 calendar days from the date of discharged but the code  will be handle by [facility] or formerly known as [facility]. You may coordinate your request by calling their phone [phone] option 6. I hope this information helps you, here is your reference number [id number].     Conversation [id number]

## 2024-09-24 ENCOUNTER — OFFICE VISIT (OUTPATIENT)
Dept: PRIMARY CARE | Facility: CLINIC | Age: 54
End: 2024-09-24
Payer: COMMERCIAL

## 2024-09-24 ENCOUNTER — LAB (OUTPATIENT)
Dept: LAB | Facility: LAB | Age: 54
End: 2024-09-24
Payer: COMMERCIAL

## 2024-09-24 VITALS
DIASTOLIC BLOOD PRESSURE: 80 MMHG | HEART RATE: 72 BPM | HEIGHT: 68 IN | WEIGHT: 257.6 LBS | SYSTOLIC BLOOD PRESSURE: 124 MMHG | TEMPERATURE: 98.2 F | OXYGEN SATURATION: 98 % | BODY MASS INDEX: 39.04 KG/M2

## 2024-09-24 DIAGNOSIS — R53.83 TIRED: ICD-10-CM

## 2024-09-24 DIAGNOSIS — I10 PRIMARY HYPERTENSION: ICD-10-CM

## 2024-09-24 DIAGNOSIS — Z12.5 SCREENING FOR PROSTATE CANCER: ICD-10-CM

## 2024-09-24 DIAGNOSIS — E11.65 TYPE 2 DIABETES MELLITUS WITH HYPERGLYCEMIA, WITHOUT LONG-TERM CURRENT USE OF INSULIN: Primary | ICD-10-CM

## 2024-09-24 DIAGNOSIS — E11.65 TYPE 2 DIABETES MELLITUS WITH HYPERGLYCEMIA, WITHOUT LONG-TERM CURRENT USE OF INSULIN: ICD-10-CM

## 2024-09-24 DIAGNOSIS — F51.01 PRIMARY INSOMNIA: ICD-10-CM

## 2024-09-24 LAB
ALBUMIN SERPL BCP-MCNC: 4.2 G/DL (ref 3.4–5)
ALP SERPL-CCNC: 86 U/L (ref 33–120)
ALT SERPL W P-5'-P-CCNC: 32 U/L (ref 10–52)
ANION GAP SERPL CALC-SCNC: 8 MMOL/L (ref 10–20)
AST SERPL W P-5'-P-CCNC: 25 U/L (ref 9–39)
BASOPHILS # BLD AUTO: 0.07 X10*3/UL (ref 0–0.1)
BASOPHILS NFR BLD AUTO: 0.7 %
BILIRUB SERPL-MCNC: 0.6 MG/DL (ref 0–1.2)
BUN SERPL-MCNC: 19 MG/DL (ref 6–23)
CALCIUM SERPL-MCNC: 9.5 MG/DL (ref 8.6–10.3)
CHLORIDE SERPL-SCNC: 103 MMOL/L (ref 98–107)
CHOLEST SERPL-MCNC: 102 MG/DL (ref 0–199)
CHOLESTEROL/HDL RATIO: 2.6
CO2 SERPL-SCNC: 31 MMOL/L (ref 21–32)
CREAT SERPL-MCNC: 1.07 MG/DL (ref 0.5–1.3)
EGFRCR SERPLBLD CKD-EPI 2021: 83 ML/MIN/1.73M*2
EOSINOPHIL # BLD AUTO: 0.4 X10*3/UL (ref 0–0.7)
EOSINOPHIL NFR BLD AUTO: 4.1 %
ERYTHROCYTE [DISTWIDTH] IN BLOOD BY AUTOMATED COUNT: 12.6 % (ref 11.5–14.5)
GLUCOSE SERPL-MCNC: 130 MG/DL (ref 74–99)
HCT VFR BLD AUTO: 45.5 % (ref 41–52)
HDLC SERPL-MCNC: 39.1 MG/DL
HGB BLD-MCNC: 15.4 G/DL (ref 13.5–17.5)
IMM GRANULOCYTES # BLD AUTO: 0.04 X10*3/UL (ref 0–0.7)
IMM GRANULOCYTES NFR BLD AUTO: 0.4 % (ref 0–0.9)
LDLC SERPL CALC-MCNC: 54 MG/DL
LYMPHOCYTES # BLD AUTO: 2.85 X10*3/UL (ref 1.2–4.8)
LYMPHOCYTES NFR BLD AUTO: 29.4 %
MCH RBC QN AUTO: 31.3 PG (ref 26–34)
MCHC RBC AUTO-ENTMCNC: 33.8 G/DL (ref 32–36)
MCV RBC AUTO: 93 FL (ref 80–100)
MONOCYTES # BLD AUTO: 0.74 X10*3/UL (ref 0.1–1)
MONOCYTES NFR BLD AUTO: 7.6 %
NEUTROPHILS # BLD AUTO: 5.58 X10*3/UL (ref 1.2–7.7)
NEUTROPHILS NFR BLD AUTO: 57.8 %
NON HDL CHOLESTEROL: 63 MG/DL (ref 0–149)
NRBC BLD-RTO: 0 /100 WBCS (ref 0–0)
PLATELET # BLD AUTO: 255 X10*3/UL (ref 150–450)
POC HEMOGLOBIN A1C: 6.1 % (ref 4.2–6.5)
POTASSIUM SERPL-SCNC: 5 MMOL/L (ref 3.5–5.3)
PROT SERPL-MCNC: 7.6 G/DL (ref 6.4–8.2)
PSA SERPL-MCNC: 0.5 NG/ML
RBC # BLD AUTO: 4.92 X10*6/UL (ref 4.5–5.9)
SODIUM SERPL-SCNC: 137 MMOL/L (ref 136–145)
TRIGL SERPL-MCNC: 44 MG/DL (ref 0–149)
TSH SERPL-ACNC: 1.3 MIU/L (ref 0.44–3.98)
URATE SERPL-MCNC: 7.7 MG/DL (ref 4–7.5)
VLDL: 9 MG/DL (ref 0–40)
WBC # BLD AUTO: 9.7 X10*3/UL (ref 4.4–11.3)

## 2024-09-24 PROCEDURE — 83036 HEMOGLOBIN GLYCOSYLATED A1C: CPT | Performed by: FAMILY MEDICINE

## 2024-09-24 PROCEDURE — 3074F SYST BP LT 130 MM HG: CPT | Performed by: FAMILY MEDICINE

## 2024-09-24 PROCEDURE — 36415 COLL VENOUS BLD VENIPUNCTURE: CPT

## 2024-09-24 PROCEDURE — 84402 ASSAY OF FREE TESTOSTERONE: CPT

## 2024-09-24 PROCEDURE — 84153 ASSAY OF PSA TOTAL: CPT

## 2024-09-24 PROCEDURE — 3079F DIAST BP 80-89 MM HG: CPT | Performed by: FAMILY MEDICINE

## 2024-09-24 PROCEDURE — 3008F BODY MASS INDEX DOCD: CPT | Performed by: FAMILY MEDICINE

## 2024-09-24 PROCEDURE — 1036F TOBACCO NON-USER: CPT | Performed by: FAMILY MEDICINE

## 2024-09-24 PROCEDURE — 99214 OFFICE O/P EST MOD 30 MIN: CPT | Performed by: FAMILY MEDICINE

## 2024-09-24 PROCEDURE — 2028F FOOT EXAM PERFORMED: CPT | Performed by: FAMILY MEDICINE

## 2024-09-24 RX ORDER — NAPROXEN 250 MG/1
250 TABLET ORAL
COMMUNITY

## 2024-09-24 RX ORDER — MULTIVIT-MIN/FOLIC/VIT K/LYCOP 400-20-370
TABLET ORAL
COMMUNITY

## 2024-09-24 ASSESSMENT — ENCOUNTER SYMPTOMS
NAUSEA: 0
DIFFICULTY URINATING: 0
SHORTNESS OF BREATH: 0
FEVER: 0
DIZZINESS: 0
DIARRHEA: 0
ENDOCRINE NEGATIVE: 1

## 2024-09-24 ASSESSMENT — PATIENT HEALTH QUESTIONNAIRE - PHQ9
SUM OF ALL RESPONSES TO PHQ9 QUESTIONS 1 AND 2: 0
2. FEELING DOWN, DEPRESSED OR HOPELESS: NOT AT ALL
1. LITTLE INTEREST OR PLEASURE IN DOING THINGS: NOT AT ALL

## 2024-09-24 ASSESSMENT — PAIN SCALES - GENERAL: PAINLEVEL: 2

## 2024-09-24 NOTE — PROGRESS NOTES
"Subjective   Patient ID: Narciso Pardo is a 54 y.o. male who presents for Obesity (Weight check) and Diabetes (B1e-mrfkocx new medication).    Diabetes mellitus type 2-Hgb A1c 6.1  Tired  Hypertension stable  Trouble falling asleep         Review of Systems   Constitutional:  Negative for fever.        Also see HPI   Eyes:  Negative for visual disturbance.   Respiratory:  Negative for shortness of breath.    Cardiovascular:  Negative for chest pain.   Gastrointestinal:  Negative for diarrhea and nausea.   Endocrine: Negative.    Genitourinary:  Negative for difficulty urinating.   Skin:  Negative for rash.   Neurological:  Negative for dizziness.        No focal deficits   Psychiatric/Behavioral:  Negative for suicidal ideas.    All other systems reviewed and are negative.      Objective   /80   Pulse 72   Temp 36.8 °C (98.2 °F)   Ht 1.727 m (5' 8\")   Wt 117 kg (257 lb 9.6 oz)   SpO2 98%   BMI 39.17 kg/m²     Physical Exam  Vitals and nursing note reviewed.   Constitutional:       Appearance: Normal appearance.   HENT:      Head: Normocephalic and atraumatic.   Eyes:      Conjunctiva/sclera: Conjunctivae normal.   Cardiovascular:      Rate and Rhythm: Normal rate and regular rhythm.      Pulses:           Dorsalis pedis pulses are 2+ on the right side and 2+ on the left side.        Posterior tibial pulses are 2+ on the right side and 2+ on the left side.      Heart sounds: Normal heart sounds.   Pulmonary:      Effort: Pulmonary effort is normal.      Breath sounds: Normal breath sounds.   Feet:      Right foot:      Skin integrity: Skin integrity normal.      Left foot:      Skin integrity: Skin integrity normal.      Comments: Sensation  intact with monofilament testing  Neurological:      Mental Status: He is oriented to person, place, and time.   Psychiatric:         Mood and Affect: Mood normal.         Behavior: Behavior normal.         Assessment/Plan   Diagnoses and all orders for this " visit:  Type 2 diabetes mellitus with hyperglycemia, without long-term current use of insulin (Multi)  -     POCT glycosylated hemoglobin (Hb A1C) manually resulted  -      Diabetes Foot Exam  -     Uric Acid; Future  -     Albumin-Creatinine Ratio, Urine Random; Future  -     CBC and Auto Differential; Future  -     Comprehensive Metabolic Panel; Future  -     Lipid Panel; Future  -     TSH with reflex to Free T4 if abnormal; Future  -     tirzepatide 2.5 mg/0.5 mL pen injector; Inject 2.5 mg under the skin every 7 days.  Tired  -     Testosterone, total and free; Future  Primary insomnia  Primary hypertension  -     Uric Acid; Future  -     Albumin-Creatinine Ratio, Urine Random; Future  -     CBC and Auto Differential; Future  -     Comprehensive Metabolic Panel; Future  -     Lipid Panel; Future  -     TSH with reflex to Free T4 if abnormal; Future  Screening for prostate cancer  -     Prostate Specific Antigen, Screen; Future

## 2024-09-29 LAB
TESTOSTERONE FREE (CHAN): 46 PG/ML (ref 35–155)
TESTOSTERONE,TOTAL,LC-MS/MS: 230 NG/DL (ref 250–1100)

## 2024-10-18 RX ORDER — CYCLOBENZAPRINE HCL 10 MG
10 TABLET ORAL AS NEEDED
COMMUNITY
Start: 2023-06-29

## 2024-10-21 ENCOUNTER — APPOINTMENT (OUTPATIENT)
Dept: PRIMARY CARE | Facility: CLINIC | Age: 54
End: 2024-10-21
Payer: COMMERCIAL

## 2024-10-22 ENCOUNTER — OFFICE VISIT (OUTPATIENT)
Dept: PRIMARY CARE | Facility: CLINIC | Age: 54
End: 2024-10-22
Payer: COMMERCIAL

## 2024-10-22 VITALS
SYSTOLIC BLOOD PRESSURE: 136 MMHG | DIASTOLIC BLOOD PRESSURE: 80 MMHG | HEIGHT: 68 IN | HEART RATE: 90 BPM | OXYGEN SATURATION: 96 % | TEMPERATURE: 98.1 F | BODY MASS INDEX: 39.59 KG/M2 | WEIGHT: 261.2 LBS

## 2024-10-22 DIAGNOSIS — F41.9 ANXIETY: Primary | ICD-10-CM

## 2024-10-22 PROCEDURE — 2028F FOOT EXAM PERFORMED: CPT | Performed by: FAMILY MEDICINE

## 2024-10-22 PROCEDURE — 3075F SYST BP GE 130 - 139MM HG: CPT | Performed by: FAMILY MEDICINE

## 2024-10-22 PROCEDURE — 99213 OFFICE O/P EST LOW 20 MIN: CPT | Performed by: FAMILY MEDICINE

## 2024-10-22 PROCEDURE — 3079F DIAST BP 80-89 MM HG: CPT | Performed by: FAMILY MEDICINE

## 2024-10-22 PROCEDURE — 3048F LDL-C <100 MG/DL: CPT | Performed by: FAMILY MEDICINE

## 2024-10-22 PROCEDURE — 3008F BODY MASS INDEX DOCD: CPT | Performed by: FAMILY MEDICINE

## 2024-10-22 RX ORDER — LORAZEPAM 0.5 MG/1
0.5 TABLET ORAL 2 TIMES DAILY PRN
Qty: 30 TABLET | Refills: 1 | Status: SHIPPED | OUTPATIENT
Start: 2024-10-22 | End: 2025-01-20

## 2024-10-22 ASSESSMENT — PATIENT HEALTH QUESTIONNAIRE - PHQ9
SUM OF ALL RESPONSES TO PHQ9 QUESTIONS 1 AND 2: 0
1. LITTLE INTEREST OR PLEASURE IN DOING THINGS: NOT AT ALL
2. FEELING DOWN, DEPRESSED OR HOPELESS: NOT AT ALL

## 2024-10-22 ASSESSMENT — PAIN SCALES - GENERAL: PAINLEVEL_OUTOF10: 4

## 2024-10-23 ENCOUNTER — APPOINTMENT (OUTPATIENT)
Dept: PRIMARY CARE | Facility: CLINIC | Age: 54
End: 2024-10-23
Payer: COMMERCIAL

## 2024-11-26 ENCOUNTER — ANESTHESIA EVENT (OUTPATIENT)
Dept: GASTROENTEROLOGY | Facility: HOSPITAL | Age: 54
End: 2024-11-26

## 2024-11-26 NOTE — ANESTHESIA PREPROCEDURE EVALUATION
Patient: Narciso Pardo    Procedure Information       Date/Time: 12/19/24 4235    Scheduled providers: Alton Muro MD    Procedure: COLONOSCOPY    Location: [facility]            Relevant Problems   Anesthesia (within normal limits)      Cardiac   (+) Benign hypertension      Pulmonary (within normal limits)      Neuro   (+) Lumbar radiculopathy      GI (within normal limits)      /Renal (within normal limits)      Liver   (+) Fatty liver   (+) Liver cyst      Endocrine   (+) Diabetes mellitus type 2, controlled, without complications (Multi)   (+) Morbid obesity (Multi)      Hematology (within normal limits)      Musculoskeletal   (+) Calcification, joint   (+) Degeneration of intervertebral disc of lumbar region   (+) Localized osteoarthritis of left knee   (+) Lumbar spondylosis   (+) Primary osteoarthritis of right hip      HEENT (within normal limits)      ID   (+) Paronychia of finger      Skin (within normal limits)      GYN (within normal limits)      Digestive   (+) Abnormal LFTs     There were no vitals filed for this visit.    Past Surgical History:   Procedure Laterality Date    CARPAL TUNNEL RELEASE Right     SHOULDER SURGERY Left     WRIST SURGERY Left      Past Medical History:   Diagnosis Date    Fatty (change of) liver, not elsewhere classified     Fatty liver    Hypertension     Other conditions influencing health status     Sprained Left Supraspinatus Tendon    Other obesity 05/11/2017    Moderate obesity    Other obesity 03/30/2018    Moderate obesity    Personal history of other diseases of the musculoskeletal system and connective tissue     Personal history of gout    Personal history of other diseases of the musculoskeletal system and connective tissue     Personal history of gout    Personal history of other mental and behavioral disorders     History of depression    Type 2 diabetes mellitus without complications (Multi) 02/03/2017    Diabetes mellitus       Current  Outpatient Medications:     cyclobenzaprine (Flexeril) 10 mg tablet, Take 1 tablet (10 mg) by mouth if needed for muscle spasms., Disp: , Rfl:     diclofenac sodium (Voltaren) 1 % gel gel, Apply 4.5 inches (4 g) topically 4 times a day., Disp: , Rfl:     dulaglutide (Trulicity) 0.75 mg/0.5 mL pen injector, Inject 0.75 mg under the skin 1 (one) time per week., Disp: 2 mL, Rfl: 5    ibuprofen (Motrin) capsule, every 8 hours. 4 Tablets Orally every 8 hrs (Patient not taking: Reported on 10/22/2024), Disp: , Rfl:     krill oil/hyaluronic/astaxanth (MOVE FREE ULTRA OMEGA JOINT PL ORAL), Move Free Ultra Joint Health 40-5-3.3 MG, Disp: , Rfl:     LORazepam (Ativan) 0.5 mg tablet, Take 1 tablet (0.5 mg) by mouth 2 times a day as needed for anxiety. F41.9, Disp: 30 tablet, Rfl: 1    multivit-min-folic-vit K-lycop (One-A-Day Men's 50 Plus,vit K,) 400- mcg tablet, Take by mouth., Disp: , Rfl:     naproxen (Naprosyn) 250 mg tablet, Take 1 tablet (250 mg) by mouth 2 times daily (morning and late afternoon). Aleve, Disp: , Rfl:     sildenafil (Viagra) 100 mg tablet, Take 1 tablet (100 mg) by mouth once daily as needed for erectile dysfunction. 1 hour prior to intercourse Orally Once a day as needed, Disp: 30 tablet, Rfl: 5    sod picosulf-mag ox-citric ac (Clenpiq) 10 mg-3.5 gram- 12 gram/175 mL solution, Take one bottle twice as directed by the prep instructions, Disp: 350 mL, Rfl: 0    tirzepatide 2.5 mg/0.5 mL pen injector, Inject 2.5 mg under the skin every 7 days. (Patient not taking: Reported on 10/22/2024), Disp: 2 mL, Rfl: 11  Prior to Admission medications    Medication Sig Start Date End Date Taking? Authorizing Provider   cyclobenzaprine (Flexeril) 10 mg tablet Take 1 tablet (10 mg) by mouth if needed for muscle spasms. 6/29/23   Historical Provider, MD   diclofenac sodium (Voltaren) 1 % gel gel Apply 4.5 inches (4 g) topically 4 times a day.    Historical Provider, MD   dulaglutide (Trulicity) 0.75 mg/0.5 mL pen  injector Inject 0.75 mg under the skin 1 (one) time per week. 6/30/24   Sergio Villalobos DO   ibuprofen (Motrin) capsule every 8 hours. 4 Tablets Orally every 8 hrs  Patient not taking: Reported on 10/22/2024    Historical Provider, MD   krill oil/hyaluronic/astaxanth (MOVE FREE ULTRA OMEGA JOINT PL ORAL) Move Free Ultra Joint Health 40-5-3.3 MG    Historical Provider, MD   LORazepam (Ativan) 0.5 mg tablet Take 1 tablet (0.5 mg) by mouth 2 times a day as needed for anxiety. F41.9 10/22/24 1/20/25  Sergio Villalobos DO   multivit-min-folic-vit K-lycop (One-A-Day Men's 50 Plus,vit K,) 400- mcg tablet Take by mouth.    Historical Provider, MD   naproxen (Naprosyn) 250 mg tablet Take 1 tablet (250 mg) by mouth 2 times daily (morning and late afternoon). Aleve    Historical Provider, MD   sildenafil (Viagra) 100 mg tablet Take 1 tablet (100 mg) by mouth once daily as needed for erectile dysfunction. 1 hour prior to intercourse Orally Once a day as needed 2/26/24   Sergio Villalobos DO   sod picosulf-mag ox-citric ac (Clenpiq) 10 mg-3.5 gram- 12 gram/175 mL solution Take one bottle twice as directed by the prep instructions 7/1/24   Alton Muro MD   tirzepatide 2.5 mg/0.5 mL pen injector Inject 2.5 mg under the skin every 7 days.  Patient not taking: Reported on 10/22/2024 9/24/24   Sergio Villalobos DO     No Known Allergies  Social History     Tobacco Use    Smoking status: Never    Smokeless tobacco: Never   Substance Use Topics    Alcohol use: Yes     Alcohol/week: 7.0 standard drinks of alcohol     Types: 7 Cans of beer per week         Chemistry    Lab Results   Component Value Date/Time     09/24/2024 1047    K 5.0 09/24/2024 1047     09/24/2024 1047    CO2 31 09/24/2024 1047    BUN 19 09/24/2024 1047    CREATININE 1.07 09/24/2024 1047    Lab Results   Component Value Date/Time    CALCIUM 9.5 09/24/2024 1047    ALKPHOS 86 09/24/2024 1047    AST 25 09/24/2024 1047    ALT 32 09/24/2024 1047    BILITOT 0.6  "09/24/2024 1047          Lab Results   Component Value Date/Time    WBC 9.7 09/24/2024 1047    HGB 15.4 09/24/2024 1047    HCT 45.5 09/24/2024 1047     09/24/2024 1047     No results found for: \"PROTIME\", \"PTT\", \"INR\"  No results found for this or any previous visit (from the past 4464 hours).  No results found for this or any previous visit from the past 1095 days.    Clinical information reviewed:                 Chart reviewed. No clearances ordered.    NPO Detail:  No data recorded     PHYSICAL EXAM    Anesthesia Plan  "

## 2024-11-27 ENCOUNTER — PRE-ADMISSION TESTING (OUTPATIENT)
Dept: PREADMISSION TESTING | Facility: HOSPITAL | Age: 54
End: 2024-11-27
Payer: COMMERCIAL

## 2024-11-27 VITALS — HEIGHT: 68 IN | WEIGHT: 260 LBS | BODY MASS INDEX: 39.4 KG/M2

## 2024-11-27 ASSESSMENT — DUKE ACTIVITY SCORE INDEX (DASI)
CAN YOU DO YARD WORK LIKE RAKING LEAVES, WEEDING OR PUSHING A MOWER: YES
CAN YOU DO HEAVY WORK AROUND THE HOUSE LIKE SCRUBBING FLOORS OR LIFTING AND MOVING HEAVY FURNITURE: YES
DASI METS SCORE: 7.3
CAN YOU DO MODERATE WORK AROUND THE HOUSE LIKE VACUUMING, SWEEPING FLOORS OR CARRYING GROCERIES: YES
CAN YOU PARTICIPATE IN STRENOUS SPORTS LIKE SWIMMING, SINGLES TENNIS, FOOTBALL, BASKETBALL, OR SKIING: NO
CAN YOU RUN A SHORT DISTANCE: NO
CAN YOU PARTICIPATE IN MODERATE RECREATIONAL ACTIVITIES LIKE GOLF, BOWLING, DANCING, DOUBLES TENNIS OR THROWING A BASEBALL OR FOOTBALL: NO
CAN YOU CLIMB A FLIGHT OF STAIRS OR WALK UP A HILL: YES
CAN YOU WALK INDOORS, SUCH AS AROUND YOUR HOUSE: YES
CAN YOU DO LIGHT WORK AROUND THE HOUSE LIKE DUSTING OR WASHING DISHES: YES
TOTAL_SCORE: 36.7
CAN YOU TAKE CARE OF YOURSELF (EAT, DRESS, BATHE, OR USE TOILET): YES
CAN YOU WALK A BLOCK OR TWO ON LEVEL GROUND: YES
CAN YOU HAVE SEXUAL RELATIONS: YES

## 2024-11-27 NOTE — PREPROCEDURE INSTRUCTIONS
Medication List            Accurate as of November 27, 2024 12:15 PM. Always use your most recent med list.                Clenpiq 10 mg-3.5 gram- 12 gram/175 mL solution  Generic drug: sod picosulf-mag ox-citric ac  Take one bottle twice as directed by the prep instructions  Medication Adjustments for Surgery: Take/Use as prescribed     cyclobenzaprine 10 mg tablet  Commonly known as: Flexeril  Medication Adjustments for Surgery: Take last dose 1 day (24 hours) before surgery     diclofenac sodium 1 % gel  Commonly known as: Voltaren  Medication Adjustments for Surgery: Take last dose 1 day (24 hours) before surgery     ibuprofen capsule  Commonly known as: Motrin  Additional Medication Adjustments for Surgery: Take last dose 5 days before surgery     LORazepam 0.5 mg tablet  Commonly known as: Ativan  Take 1 tablet (0.5 mg) by mouth 2 times a day as needed for anxiety. F41.9  Medication Adjustments for Surgery: Take/Use as prescribed     MOVE FREE ULTRA OMEGA JOINT PL ORAL  Medication Adjustments for Surgery: Take last dose 1 day (24 hours) before surgery     naproxen 250 mg tablet  Commonly known as: Naprosyn  Additional Medication Adjustments for Surgery: Take last dose 5 days before surgery     One-A-Day Men's 50 Plus(vit K) 400- mcg tablet  Generic drug: multivit-min-folic-vit K-lycop  Medication Adjustments for Surgery: Take last dose 2 days before surgery     sildenafil 100 mg tablet  Commonly known as: Viagra  Take 1 tablet (100 mg) by mouth once daily as needed for erectile dysfunction. 1 hour prior to intercourse Orally Once a day as needed  Medication Adjustments for Surgery: Take last dose 2 days before surgery     tirzepatide 2.5 mg/0.5 mL pen injector  Inject 2.5 mg under the skin every 7 days.  Additional Medication Adjustments for Surgery: Other (Comment)     Trulicity 0.75 mg/0.5 mL pen injector  Generic drug: dulaglutide  Inject 0.75 mg under the skin 1 (one) time per week.  Additional  Medication Adjustments for Surgery: Other (Comment)                   Per pt- he is not on Trulicity or Tirzepatide.            NPO Instructions:    Follow instructions. Day before procedure-may have light breakfast by 9am (ex. 1 egg, 1 piece of toast).  Then CLEAR LIQUIDS ONLY-No dairy products, nothing red/purple.  Take first part of prep- Evening Before Procedure.  Drink lots of liquids.  Day of Procedure- 2-3 am Take Second Part of Prep.  After Midnight the only Clear Liquid allowed is: Water, Black Coffee, Tea, Gatorade and Clear Soda.  STOP DRINKING 3 HOURS PRIOR TO PROCEDURE.  Take medications as instructed.   No Gum, Candy, Mints or Smoking day of the procedure!     Additional Instructions:     Review your medication instructions, take indicated medications  Wear  comfortable loose fitting clothing  All jewelry and valuables should be left at home    Park in back of hospital by ER. Come up to Second floor-Outpt dept to check in.  Bring Photo ID and Insurance card,   You MUST have a  with you.  No more than 2 visitors with you please.      If you get ill at all before your procedure- CALL YOUR DOCTOR/SURGEON.  We want you in the best shape that is possible. Any sickness might lead to your procedure being delayed.      Call Outpatient dept at [phone] the night before your procedure (Friday for Monday procedure), between 1-3 pm.

## 2024-12-18 ENCOUNTER — APPOINTMENT (OUTPATIENT)
Dept: PRIMARY CARE | Facility: CLINIC | Age: 54
End: 2024-12-18
Payer: COMMERCIAL

## 2024-12-19 ENCOUNTER — ANESTHESIA (OUTPATIENT)
Dept: GASTROENTEROLOGY | Facility: HOSPITAL | Age: 54
End: 2024-12-19

## 2024-12-19 ENCOUNTER — APPOINTMENT (OUTPATIENT)
Dept: GASTROENTEROLOGY | Facility: HOSPITAL | Age: 54
End: 2024-12-19
Payer: COMMERCIAL

## 2024-12-22 ASSESSMENT — ENCOUNTER SYMPTOMS
DIFFICULTY URINATING: 0
DIARRHEA: 0
DIZZINESS: 0
SHORTNESS OF BREATH: 0
NAUSEA: 0
ENDOCRINE NEGATIVE: 1
FEVER: 0
INSOMNIA: 1

## 2024-12-22 NOTE — PROGRESS NOTES
"Subjective   Patient ID: Narciso Pardo is a 54 y.o. male who presents for Insomnia and mental health issues.    Patient with increased stress, increased anxiety, no suicidal ideation  Insomnia, some depression but no suicidal thoughts or plan    Insomnia  Pertinent negatives include no chest pain, fever, nausea or rash.        Review of Systems   Constitutional:  Negative for fever.        Also see HPI   Eyes:  Negative for visual disturbance.   Respiratory:  Negative for shortness of breath.    Cardiovascular:  Negative for chest pain.   Gastrointestinal:  Negative for diarrhea and nausea.   Endocrine: Negative.    Genitourinary:  Negative for difficulty urinating.   Skin:  Negative for rash.   Neurological:  Negative for dizziness.        No focal deficits   Psychiatric/Behavioral:  Negative for suicidal ideas. The patient has insomnia.    All other systems reviewed and are negative.      Objective   /80   Pulse 90   Temp 36.7 °C (98.1 °F)   Ht 1.727 m (5' 8\")   Wt 118 kg (261 lb 3.2 oz)   SpO2 96%   BMI 39.72 kg/m²     Physical Exam  Vitals and nursing note reviewed.   Constitutional:       Appearance: Normal appearance.   HENT:      Head: Normocephalic and atraumatic.   Eyes:      Conjunctiva/sclera: Conjunctivae normal.   Cardiovascular:      Rate and Rhythm: Normal rate and regular rhythm.      Heart sounds: Normal heart sounds.   Pulmonary:      Effort: Pulmonary effort is normal.      Breath sounds: Normal breath sounds.   Neurological:      Mental Status: He is oriented to person, place, and time.   Psychiatric:         Behavior: Behavior normal.      Comments: Patient is depressed and anxious but not suicidal         Assessment/Plan   Diagnoses and all orders for this visit:  Anxiety  -     LORazepam (Ativan) 0.5 mg tablet; Take 1 tablet (0.5 mg) by mouth 2 times a day as needed for anxiety. F41.9         "

## 2025-01-13 ENCOUNTER — OFFICE VISIT (OUTPATIENT)
Dept: PRIMARY CARE | Facility: CLINIC | Age: 55
End: 2025-01-13
Payer: COMMERCIAL

## 2025-01-13 VITALS
DIASTOLIC BLOOD PRESSURE: 80 MMHG | WEIGHT: 274 LBS | TEMPERATURE: 98.1 F | BODY MASS INDEX: 41.52 KG/M2 | SYSTOLIC BLOOD PRESSURE: 136 MMHG | OXYGEN SATURATION: 97 % | HEIGHT: 68 IN | HEART RATE: 82 BPM

## 2025-01-13 DIAGNOSIS — J01.00 ACUTE NON-RECURRENT MAXILLARY SINUSITIS: ICD-10-CM

## 2025-01-13 DIAGNOSIS — H91.91 DECREASED HEARING OF RIGHT EAR: ICD-10-CM

## 2025-01-13 DIAGNOSIS — E11.65 TYPE 2 DIABETES MELLITUS WITH HYPERGLYCEMIA, WITHOUT LONG-TERM CURRENT USE OF INSULIN: Primary | ICD-10-CM

## 2025-01-13 PROCEDURE — 3008F BODY MASS INDEX DOCD: CPT | Performed by: FAMILY MEDICINE

## 2025-01-13 PROCEDURE — 3075F SYST BP GE 130 - 139MM HG: CPT | Performed by: FAMILY MEDICINE

## 2025-01-13 PROCEDURE — 1036F TOBACCO NON-USER: CPT | Performed by: FAMILY MEDICINE

## 2025-01-13 PROCEDURE — 99214 OFFICE O/P EST MOD 30 MIN: CPT | Performed by: FAMILY MEDICINE

## 2025-01-13 PROCEDURE — 3079F DIAST BP 80-89 MM HG: CPT | Performed by: FAMILY MEDICINE

## 2025-01-13 RX ORDER — TIRZEPATIDE 2.5 MG/.5ML
2.5 INJECTION, SOLUTION SUBCUTANEOUS WEEKLY
Qty: 2 ML | Refills: 2 | Status: SHIPPED | OUTPATIENT
Start: 2025-01-13

## 2025-01-13 RX ORDER — DOXYCYCLINE 100 MG/1
100 CAPSULE ORAL 2 TIMES DAILY
Qty: 20 CAPSULE | Refills: 0 | Status: SHIPPED | OUTPATIENT
Start: 2025-01-13 | End: 2025-01-23

## 2025-01-13 ASSESSMENT — ENCOUNTER SYMPTOMS
NAUSEA: 0
SHORTNESS OF BREATH: 0
DIARRHEA: 0
DIZZINESS: 0
FEVER: 0
DIFFICULTY URINATING: 0
ENDOCRINE NEGATIVE: 1

## 2025-01-13 ASSESSMENT — PAIN SCALES - GENERAL: PAINLEVEL_OUTOF10: 0-NO PAIN

## 2025-01-13 NOTE — PROGRESS NOTES
"Subjective   Patient ID: Narciso Pardo is a 55 y.o. male who presents for Obesity (Weight check) and Ear Fullness (Right- felt plugged after getting off the plane- feeling better).    Patient with diabetes mellitus type 2  Morbid obesity  Hypertension  Chronic back pain  Patient has had issues with pressure in the ear, pressure in the right ear recently         Review of Systems   Constitutional:  Negative for fever.        Also see HPI   Eyes:  Negative for visual disturbance.   Respiratory:  Negative for shortness of breath.    Cardiovascular:  Negative for chest pain.   Gastrointestinal:  Negative for diarrhea and nausea.   Endocrine: Negative.    Genitourinary:  Negative for difficulty urinating.   Skin:  Negative for rash.   Neurological:  Negative for dizziness.        No focal deficits   Psychiatric/Behavioral:  Negative for suicidal ideas.    All other systems reviewed and are negative.      Objective   /80   Pulse 82   Temp 36.7 °C (98.1 °F)   Ht 1.727 m (5' 8\")   Wt 124 kg (274 lb)   SpO2 97%   BMI 41.66 kg/m²     Physical Exam  Vitals and nursing note reviewed.   Constitutional:       Appearance: Normal appearance.   HENT:      Head: Normocephalic and atraumatic.      Comments: Maxillary sinus tenderness with palpation     Right Ear: Tympanic membrane, ear canal and external ear normal.      Left Ear: Tympanic membrane, ear canal and external ear normal.      Mouth/Throat:      Mouth: Mucous membranes are moist.      Pharynx: Oropharynx is clear.      Comments: Cobblestoning noted  Airway patent  Eyes:      Conjunctiva/sclera: Conjunctivae normal.   Cardiovascular:      Rate and Rhythm: Normal rate and regular rhythm.      Heart sounds: Normal heart sounds.   Pulmonary:      Effort: Pulmonary effort is normal.      Breath sounds: Normal breath sounds.   Musculoskeletal:      Cervical back: Neck supple.   Neurological:      Mental Status: He is oriented to person, place, and time. "   Psychiatric:         Mood and Affect: Mood normal.         Behavior: Behavior normal.         Assessment/Plan   Diagnoses and all orders for this visit:  Type 2 diabetes mellitus with hyperglycemia, without long-term current use of insulin  -     tirzepatide (Mounjaro) 2.5 mg/0.5 mL pen injector; Inject 2.5 mg under the skin 1 (one) time per week.  Acute non-recurrent maxillary sinusitis  -     doxycycline (Vibramycin) 100 mg capsule; Take 1 capsule (100 mg) by mouth 2 times a day for 10 days. Take with at least 8 ounces (large glass) of water, do not lie down for 30 minutes after  Decreased hearing of right ear  -     Referral to ENT; Future

## 2025-01-31 ENCOUNTER — OFFICE VISIT (OUTPATIENT)
Dept: URGENT CARE | Age: 55
End: 2025-01-31
Payer: COMMERCIAL

## 2025-01-31 VITALS
HEIGHT: 69 IN | DIASTOLIC BLOOD PRESSURE: 83 MMHG | RESPIRATION RATE: 20 BRPM | BODY MASS INDEX: 37.77 KG/M2 | TEMPERATURE: 99.1 F | OXYGEN SATURATION: 98 % | SYSTOLIC BLOOD PRESSURE: 134 MMHG | WEIGHT: 255 LBS | HEART RATE: 78 BPM

## 2025-01-31 DIAGNOSIS — J10.1 INFLUENZA A: Primary | ICD-10-CM

## 2025-01-31 LAB
POC RAPID INFLUENZA A: POSITIVE
POC RAPID INFLUENZA B: NEGATIVE
POC SARS-COV-2 AG BINAX: NORMAL

## 2025-01-31 RX ORDER — OSELTAMIVIR PHOSPHATE 75 MG/1
75 CAPSULE ORAL EVERY 12 HOURS
Qty: 10 CAPSULE | Refills: 0 | Status: SHIPPED | OUTPATIENT
Start: 2025-01-31 | End: 2025-02-05

## 2025-01-31 RX ORDER — PREDNISONE 50 MG/1
50 TABLET ORAL DAILY
Qty: 5 TABLET | Refills: 0 | Status: SHIPPED | OUTPATIENT
Start: 2025-01-31 | End: 2025-02-05

## 2025-01-31 RX ORDER — PROMETHAZINE HYDROCHLORIDE AND DEXTROMETHORPHAN HYDROBROMIDE 6.25; 15 MG/5ML; MG/5ML
5 SYRUP ORAL EVERY 4 HOURS PRN
Qty: 118 ML | Refills: 0 | Status: SHIPPED | OUTPATIENT
Start: 2025-01-31

## 2025-01-31 NOTE — PROGRESS NOTES
Chief Complaint   Patient presents with    Cough    Fever    Generalized Body Aches    Nasal Congestion    Sinus Problem     Fever, body aches, chills, cough, chest congestion x 3 days.       Physical Exam:     GEN: No acute distress    ENT: Bilateral TMs and canals unremarkable, sinus congestion present. Pharynx and tonsils mildly hyperemic but without exudate.     Resp: Lungs clear to auscultation bilaterally       POC Labs:     Office Visit on 01/31/2025   Component Date Value Ref Range Status    POC RUBEN-COV-2 AG 01/31/2025 Presumptive negative test for SARS-CoV-2 (no antigen detected)  Presumptive negative test for SARS-CoV-2 (no antigen detected) Final    POC Rapid Influenza A 01/31/2025 Positive (A)  Negative Final    POC Rapid Influenza B 01/31/2025 Negative  Negative Final        Encounter Diagnosis   Name Primary?    Influenza A Yes        Medical Decision Making & Plan:   Tamiflu   Prendnisone  Tanesha JUDGE        01/31/25 at 2:13 PM - Mirtha Cabrales, DO

## 2025-03-19 ENCOUNTER — APPOINTMENT (OUTPATIENT)
Dept: PRIMARY CARE | Facility: CLINIC | Age: 55
End: 2025-03-19
Payer: COMMERCIAL

## 2025-03-24 ENCOUNTER — APPOINTMENT (OUTPATIENT)
Dept: PRIMARY CARE | Facility: CLINIC | Age: 55
End: 2025-03-24
Payer: COMMERCIAL

## 2025-03-24 NOTE — PROGRESS NOTES
Subjective   Patient ID: Narciso Pardo is a 55 y.o. male who presents for No chief complaint on file..    Last clinic visit summary (1/13/2025)  -Type 2 DM.  -Morbid obesity  -Hypertension  -Chronic back pain  -Right ear pressure: Pt received prescription for doxycycline to treat sinusitis symptoms. Also received referral to ENT.    HPI   The pt presents to the clinic with concerns of back pain. Past medical hx of HTN, type 2 DM, obesity, ED, and lumbar radiculopathy.    Review of Systems    Objective   There were no vitals taken for this visit.    Physical Exam    Assessment/Plan   {Assess/PlanSmartLinks:[id number]}

## 2025-04-07 ENCOUNTER — APPOINTMENT (OUTPATIENT)
Dept: PRIMARY CARE | Facility: CLINIC | Age: 55
End: 2025-04-07
Payer: COMMERCIAL

## 2025-04-09 ENCOUNTER — APPOINTMENT (OUTPATIENT)
Dept: PRIMARY CARE | Facility: CLINIC | Age: 55
End: 2025-04-09
Payer: COMMERCIAL

## 2025-04-16 ENCOUNTER — APPOINTMENT (OUTPATIENT)
Dept: PRIMARY CARE | Facility: CLINIC | Age: 55
End: 2025-04-16
Payer: COMMERCIAL

## 2025-04-16 ENCOUNTER — TELEMEDICINE (OUTPATIENT)
Dept: PRIMARY CARE | Facility: CLINIC | Age: 55
End: 2025-04-16
Payer: COMMERCIAL

## 2025-04-16 DIAGNOSIS — E11.65 TYPE 2 DIABETES MELLITUS WITH HYPERGLYCEMIA, WITHOUT LONG-TERM CURRENT USE OF INSULIN: ICD-10-CM

## 2025-04-16 DIAGNOSIS — F51.01 PRIMARY INSOMNIA: Primary | ICD-10-CM

## 2025-04-16 DIAGNOSIS — F41.9 ANXIETY: ICD-10-CM

## 2025-04-16 PROCEDURE — 99213 OFFICE O/P EST LOW 20 MIN: CPT | Performed by: FAMILY MEDICINE

## 2025-04-16 PROCEDURE — 1036F TOBACCO NON-USER: CPT | Performed by: FAMILY MEDICINE

## 2025-04-16 RX ORDER — LORAZEPAM 0.5 MG/1
0.5 TABLET ORAL DAILY PRN
Qty: 30 TABLET | Refills: 0 | Status: SHIPPED | OUTPATIENT
Start: 2025-04-16

## 2025-04-16 RX ORDER — TRAZODONE HYDROCHLORIDE 50 MG/1
50 TABLET ORAL NIGHTLY PRN
Qty: 30 TABLET | Refills: 5 | Status: SHIPPED | OUTPATIENT
Start: 2025-04-16

## 2025-04-16 RX ORDER — TIRZEPATIDE 5 MG/.5ML
5 INJECTION, SOLUTION SUBCUTANEOUS WEEKLY
Qty: 2 ML | Refills: 5 | Status: SHIPPED | OUTPATIENT
Start: 2025-04-16

## 2025-04-16 ASSESSMENT — ENCOUNTER SYMPTOMS
LOSS OF SENSATION IN FEET: 0
SHORTNESS OF BREATH: 0
DIFFICULTY URINATING: 0
DIARRHEA: 0
OCCASIONAL FEELINGS OF UNSTEADINESS: 0
DEPRESSION: 0
DIZZINESS: 0
FEVER: 0
ENDOCRINE NEGATIVE: 1
NAUSEA: 0

## 2025-04-16 NOTE — PROGRESS NOTES
Subjective   Patient ID: Tk Pardo is a 55 y.o. male who presents for stress and weight issues (Patient called 9:25 am left message on voice mail).    HPI   The pt presents to the clinic, via telehealth visit, with concerns of stress/weight issues. Past medical hx of HTN, type 2 DM, obesity, testicular hypofunction, ED, and lumbar radiculopathy. The pt gave their consent for a telehealth visit. The telehealth visit included audio and video. The pt was at their home for the duration of the telehealth visit. The telehealth visit was 10-15 minutes in length.    -Anxiety/Insomnia: Pt states that his anxiety has been uncontrolled recently. No suicidal thoughts/ideation. He started a new position at work and does note some work-related stress that has been impacting his sleep. Currently taking Ativan 0.5 mg 2 times daily PRN. Doing well on this medication. No side-effects reported. OARRS reviewed (okay). Refilled Ativan prescription. Pt also received prescription for trazodone medication to better control anxiety and improve insomnia symptoms.    -Obesity/Type 2 DM: Pt states that his weight has been fairly stable recently around ~255 lbs (BMI ~37). He wants to lose more weight. Currently taking Mounjaro injections 2.5 mg weekly. He feels that it would be beneficial for him to take a higher dose of Mounjaro to further facilitate weight management and better control blood sugars. He has not experienced any side-effects to current dose of Mounjaro injections. As such, pt received dose increase (from 2.5 mg weekly to 5.0 mg weekly) for his Mounjaro injections prescription to further facilitate weight loss and control blood sugars.    Review of Systems   Constitutional:  Negative for fever.        Also see HPI   Eyes:  Negative for visual disturbance.   Respiratory:  Negative for shortness of breath.    Cardiovascular:  Negative for chest pain.   Gastrointestinal:  Negative for diarrhea and nausea.   Endocrine:  Negative.    Genitourinary:  Negative for difficulty urinating.   Skin:  Negative for rash.   Neurological:  Negative for dizziness.        No focal deficits   Psychiatric/Behavioral:  Negative for suicidal ideas.    All other systems reviewed and are negative.      Objective   There were no vitals taken for this visit.    Physical Exam  Constitutional:       Appearance: Normal appearance.   HENT:      Head: Normocephalic and atraumatic.   Pulmonary:      Effort: Pulmonary effort is normal. No respiratory distress.   Neurological:      Mental Status: He is alert and oriented to person, place, and time.   Psychiatric:         Mood and Affect: Mood normal.         Behavior: Behavior normal.         Thought Content: Thought content normal.         Judgment: Judgment normal.         Assessment/Plan   Diagnoses and all orders for this visit:  Primary insomnia  -     traZODone (Desyrel) 50 mg tablet; Take 1 tablet (50 mg) by mouth as needed at bedtime for sleep.  Type 2 diabetes mellitus with hyperglycemia, without long-term current use of insulin  -     Follow Up In Primary Care - Established  -     tirzepatide (Mounjaro) 5 mg/0.5 mL pen injector; Inject 5 mg under the skin 1 (one) time per week.  Anxiety  -     LORazepam (Ativan) 0.5 mg tablet; Take 1 tablet (0.5 mg) by mouth once daily as needed for anxiety. F41.9         Scribe Attestation  By signing my name below, ISierra , Scrthai   attest that this documentation has been prepared under the direction and in the presence of Sergio Villalobos DO.

## 2025-04-22 ENCOUNTER — APPOINTMENT (OUTPATIENT)
Dept: OTOLARYNGOLOGY | Facility: CLINIC | Age: 55
End: 2025-04-22
Payer: COMMERCIAL

## 2025-04-22 ENCOUNTER — APPOINTMENT (OUTPATIENT)
Dept: AUDIOLOGY | Facility: CLINIC | Age: 55
End: 2025-04-22
Payer: COMMERCIAL

## 2025-04-25 ENCOUNTER — APPOINTMENT (OUTPATIENT)
Dept: PRIMARY CARE | Facility: CLINIC | Age: 55
End: 2025-04-25
Payer: COMMERCIAL

## 2025-08-01 ENCOUNTER — APPOINTMENT (OUTPATIENT)
Dept: PRIMARY CARE | Facility: CLINIC | Age: 55
End: 2025-08-01
Payer: COMMERCIAL

## 2025-08-11 ENCOUNTER — PATIENT MESSAGE (OUTPATIENT)
Dept: PRIMARY CARE | Facility: CLINIC | Age: 55
End: 2025-08-11
Payer: COMMERCIAL

## 2025-08-11 DIAGNOSIS — E11.65 TYPE 2 DIABETES MELLITUS WITH HYPERGLYCEMIA, WITHOUT LONG-TERM CURRENT USE OF INSULIN: Primary | ICD-10-CM

## 2025-09-08 ENCOUNTER — APPOINTMENT (OUTPATIENT)
Dept: PRIMARY CARE | Facility: CLINIC | Age: 55
End: 2025-09-08
Payer: COMMERCIAL